# Patient Record
Sex: MALE | Race: WHITE | NOT HISPANIC OR LATINO | Employment: FULL TIME | URBAN - METROPOLITAN AREA
[De-identification: names, ages, dates, MRNs, and addresses within clinical notes are randomized per-mention and may not be internally consistent; named-entity substitution may affect disease eponyms.]

---

## 2020-11-22 ENCOUNTER — APPOINTMENT (OUTPATIENT)
Dept: MRI IMAGING | Facility: HOSPITAL | Age: 40
End: 2020-11-22

## 2020-11-22 ENCOUNTER — APPOINTMENT (OUTPATIENT)
Dept: CT IMAGING | Facility: HOSPITAL | Age: 40
End: 2020-11-22

## 2020-11-22 ENCOUNTER — HOSPITAL ENCOUNTER (OUTPATIENT)
Facility: HOSPITAL | Age: 40
Setting detail: OBSERVATION
Discharge: HOME | End: 2020-11-24
Attending: EMERGENCY MEDICINE | Admitting: INTERNAL MEDICINE

## 2020-11-22 DIAGNOSIS — Z86.39 HISTORY OF DIABETES MELLITUS: ICD-10-CM

## 2020-11-22 DIAGNOSIS — G45.9 TIA (TRANSIENT ISCHEMIC ATTACK): Primary | ICD-10-CM

## 2020-11-22 DIAGNOSIS — Z87.891 HISTORY OF TOBACCO USE: ICD-10-CM

## 2020-11-22 DIAGNOSIS — J06.9 UPPER RESPIRATORY TRACT INFECTION, UNSPECIFIED TYPE: ICD-10-CM

## 2020-11-22 PROBLEM — E78.5 HYPERLIPIDEMIA: Status: ACTIVE | Noted: 2020-11-22

## 2020-11-22 PROBLEM — E11.9 TYPE 2 DIABETES MELLITUS: Status: ACTIVE | Noted: 2020-11-22

## 2020-11-22 LAB
ALBUMIN SERPL-MCNC: 4.8 G/DL (ref 3.5–5.2)
ALBUMIN/GLOB SERPL: 2 G/DL
ALP SERPL-CCNC: 66 U/L (ref 39–117)
ALT SERPL W P-5'-P-CCNC: 43 U/L (ref 1–41)
ANION GAP SERPL CALCULATED.3IONS-SCNC: 14 MMOL/L (ref 5–15)
AST SERPL-CCNC: 40 U/L (ref 1–40)
B PARAPERT DNA SPEC QL NAA+PROBE: NOT DETECTED
B PERT DNA SPEC QL NAA+PROBE: NOT DETECTED
BASOPHILS # BLD AUTO: 0.08 10*3/MM3 (ref 0–0.2)
BASOPHILS NFR BLD AUTO: 1 % (ref 0–1.5)
BILIRUB SERPL-MCNC: 0.4 MG/DL (ref 0–1.2)
BILIRUB UR QL STRIP: NEGATIVE
BUN SERPL-MCNC: 23 MG/DL (ref 6–20)
BUN/CREAT SERPL: 33.3 (ref 7–25)
C PNEUM DNA NPH QL NAA+NON-PROBE: NOT DETECTED
CALCIUM SPEC-SCNC: 9.5 MG/DL (ref 8.6–10.5)
CHLORIDE SERPL-SCNC: 99 MMOL/L (ref 98–107)
CLARITY UR: CLEAR
CO2 SERPL-SCNC: 24 MMOL/L (ref 22–29)
COLOR UR: YELLOW
CREAT SERPL-MCNC: 0.69 MG/DL (ref 0.76–1.27)
DEPRECATED RDW RBC AUTO: 47.8 FL (ref 37–54)
EOSINOPHIL # BLD AUTO: 0.33 10*3/MM3 (ref 0–0.4)
EOSINOPHIL NFR BLD AUTO: 4 % (ref 0.3–6.2)
ERYTHROCYTE [DISTWIDTH] IN BLOOD BY AUTOMATED COUNT: 15.3 % (ref 12.3–15.4)
FLUAV H1 2009 PAND RNA NPH QL NAA+PROBE: NOT DETECTED
FLUAV H1 HA GENE NPH QL NAA+PROBE: NOT DETECTED
FLUAV H3 RNA NPH QL NAA+PROBE: NOT DETECTED
FLUAV SUBTYP SPEC NAA+PROBE: NOT DETECTED
FLUBV RNA ISLT QL NAA+PROBE: NOT DETECTED
GFR SERPL CREATININE-BSD FRML MDRD: 128 ML/MIN/1.73
GLOBULIN UR ELPH-MCNC: 2.4 GM/DL
GLUCOSE BLDC GLUCOMTR-MCNC: 272 MG/DL (ref 70–130)
GLUCOSE SERPL-MCNC: 171 MG/DL (ref 65–99)
GLUCOSE UR STRIP-MCNC: ABNORMAL MG/DL
HADV DNA SPEC NAA+PROBE: NOT DETECTED
HCOV 229E RNA SPEC QL NAA+PROBE: NOT DETECTED
HCOV HKU1 RNA SPEC QL NAA+PROBE: NOT DETECTED
HCOV NL63 RNA SPEC QL NAA+PROBE: NOT DETECTED
HCOV OC43 RNA SPEC QL NAA+PROBE: NOT DETECTED
HCT VFR BLD AUTO: 50.2 % (ref 37.5–51)
HGB BLD-MCNC: 16.7 G/DL (ref 13–17.7)
HGB UR QL STRIP.AUTO: NEGATIVE
HMPV RNA NPH QL NAA+NON-PROBE: NOT DETECTED
HOLD SPECIMEN: NORMAL
HOLD SPECIMEN: NORMAL
HPIV1 RNA SPEC QL NAA+PROBE: NOT DETECTED
HPIV2 RNA SPEC QL NAA+PROBE: NOT DETECTED
HPIV3 RNA NPH QL NAA+PROBE: NOT DETECTED
HPIV4 P GENE NPH QL NAA+PROBE: NOT DETECTED
IMM GRANULOCYTES # BLD AUTO: 0.08 10*3/MM3 (ref 0–0.05)
IMM GRANULOCYTES NFR BLD AUTO: 1 % (ref 0–0.5)
INR PPP: 1.07 (ref 0.85–1.16)
KETONES UR QL STRIP: ABNORMAL
LEUKOCYTE ESTERASE UR QL STRIP.AUTO: NEGATIVE
LYMPHOCYTES # BLD AUTO: 2.05 10*3/MM3 (ref 0.7–3.1)
LYMPHOCYTES NFR BLD AUTO: 25.1 % (ref 19.6–45.3)
M PNEUMO IGG SER IA-ACNC: NOT DETECTED
MCH RBC QN AUTO: 28.9 PG (ref 26.6–33)
MCHC RBC AUTO-ENTMCNC: 33.3 G/DL (ref 31.5–35.7)
MCV RBC AUTO: 86.9 FL (ref 79–97)
MONOCYTES # BLD AUTO: 0.51 10*3/MM3 (ref 0.1–0.9)
MONOCYTES NFR BLD AUTO: 6.2 % (ref 5–12)
NEUTROPHILS NFR BLD AUTO: 5.12 10*3/MM3 (ref 1.7–7)
NEUTROPHILS NFR BLD AUTO: 62.7 % (ref 42.7–76)
NITRITE UR QL STRIP: NEGATIVE
NRBC BLD AUTO-RTO: 0 /100 WBC (ref 0–0.2)
NT-PROBNP SERPL-MCNC: 14.7 PG/ML (ref 0–450)
PH UR STRIP.AUTO: <=5 [PH] (ref 5–8)
PLATELET # BLD AUTO: 190 10*3/MM3 (ref 140–450)
PMV BLD AUTO: 10.9 FL (ref 6–12)
POTASSIUM SERPL-SCNC: 4.3 MMOL/L (ref 3.5–5.2)
PROT SERPL-MCNC: 7.2 G/DL (ref 6–8.5)
PROT UR QL STRIP: NEGATIVE
PROTHROMBIN TIME: 13.6 SECONDS (ref 11.5–14)
QT INTERVAL: 398 MS
QT INTERVAL: 404 MS
QTC INTERVAL: 432 MS
QTC INTERVAL: 439 MS
RBC # BLD AUTO: 5.78 10*6/MM3 (ref 4.14–5.8)
RHINOVIRUS RNA SPEC NAA+PROBE: NOT DETECTED
RSV RNA NPH QL NAA+NON-PROBE: NOT DETECTED
SARS-COV-2 RNA NPH QL NAA+NON-PROBE: NOT DETECTED
SODIUM SERPL-SCNC: 137 MMOL/L (ref 136–145)
SP GR UR STRIP: 1.04 (ref 1–1.03)
TROPONIN T SERPL-MCNC: <0.01 NG/ML (ref 0–0.03)
TROPONIN T SERPL-MCNC: <0.01 NG/ML (ref 0–0.03)
UROBILINOGEN UR QL STRIP: ABNORMAL
WBC # BLD AUTO: 8.17 10*3/MM3 (ref 3.4–10.8)
WHOLE BLOOD HOLD SPECIMEN: NORMAL
WHOLE BLOOD HOLD SPECIMEN: NORMAL

## 2020-11-22 PROCEDURE — 85610 PROTHROMBIN TIME: CPT | Performed by: PHYSICIAN ASSISTANT

## 2020-11-22 PROCEDURE — 82962 GLUCOSE BLOOD TEST: CPT

## 2020-11-22 PROCEDURE — 0 IOPAMIDOL PER 1 ML: Performed by: INTERNAL MEDICINE

## 2020-11-22 PROCEDURE — 93005 ELECTROCARDIOGRAM TRACING: CPT | Performed by: EMERGENCY MEDICINE

## 2020-11-22 PROCEDURE — 70551 MRI BRAIN STEM W/O DYE: CPT

## 2020-11-22 PROCEDURE — 99219 PR INITIAL OBSERVATION CARE/DAY 50 MINUTES: CPT | Performed by: PHYSICIAN ASSISTANT

## 2020-11-22 PROCEDURE — 0202U NFCT DS 22 TRGT SARS-COV-2: CPT | Performed by: PHYSICIAN ASSISTANT

## 2020-11-22 PROCEDURE — 83880 ASSAY OF NATRIURETIC PEPTIDE: CPT | Performed by: PHYSICIAN ASSISTANT

## 2020-11-22 PROCEDURE — 70498 CT ANGIOGRAPHY NECK: CPT

## 2020-11-22 PROCEDURE — 81003 URINALYSIS AUTO W/O SCOPE: CPT | Performed by: PHYSICIAN ASSISTANT

## 2020-11-22 PROCEDURE — 70496 CT ANGIOGRAPHY HEAD: CPT

## 2020-11-22 PROCEDURE — 71275 CT ANGIOGRAPHY CHEST: CPT

## 2020-11-22 PROCEDURE — 84484 ASSAY OF TROPONIN QUANT: CPT | Performed by: PHYSICIAN ASSISTANT

## 2020-11-22 PROCEDURE — G0378 HOSPITAL OBSERVATION PER HR: HCPCS

## 2020-11-22 PROCEDURE — 93005 ELECTROCARDIOGRAM TRACING: CPT | Performed by: PHYSICIAN ASSISTANT

## 2020-11-22 PROCEDURE — 70450 CT HEAD/BRAIN W/O DYE: CPT

## 2020-11-22 PROCEDURE — 99284 EMERGENCY DEPT VISIT MOD MDM: CPT

## 2020-11-22 PROCEDURE — 85025 COMPLETE CBC W/AUTO DIFF WBC: CPT | Performed by: PHYSICIAN ASSISTANT

## 2020-11-22 PROCEDURE — 80053 COMPREHEN METABOLIC PANEL: CPT | Performed by: PHYSICIAN ASSISTANT

## 2020-11-22 PROCEDURE — 0 IOPAMIDOL PER 1 ML: Performed by: EMERGENCY MEDICINE

## 2020-11-22 RX ORDER — OLMESARTAN MEDOXOMIL, AMLODIPINE AND HYDROCHLOROTHIAZIDE TABLET 40/5/25 MG 40; 5; 25 MG/1; MG/1; MG/1
1 TABLET ORAL DAILY
COMMUNITY
End: 2022-01-13 | Stop reason: HOSPADM

## 2020-11-22 RX ORDER — EMPAGLIFLOZIN 25 MG/1
1 TABLET, FILM COATED ORAL DAILY
COMMUNITY
End: 2022-03-22

## 2020-11-22 RX ORDER — ROSUVASTATIN CALCIUM 20 MG/1
20 TABLET, COATED ORAL DAILY
COMMUNITY
End: 2020-11-24 | Stop reason: HOSPADM

## 2020-11-22 RX ORDER — FENOFIBRATE 134 MG/1
134 CAPSULE ORAL
COMMUNITY
End: 2022-03-22

## 2020-11-22 RX ORDER — NICOTINE POLACRILEX 4 MG
15 LOZENGE BUCCAL
Status: DISCONTINUED | OUTPATIENT
Start: 2020-11-22 | End: 2020-11-24 | Stop reason: HOSPADM

## 2020-11-22 RX ORDER — ATORVASTATIN CALCIUM 40 MG/1
80 TABLET, FILM COATED ORAL NIGHTLY
Status: DISCONTINUED | OUTPATIENT
Start: 2020-11-22 | End: 2020-11-23

## 2020-11-22 RX ORDER — SODIUM CHLORIDE 0.9 % (FLUSH) 0.9 %
10 SYRINGE (ML) INJECTION EVERY 12 HOURS SCHEDULED
Status: DISCONTINUED | OUTPATIENT
Start: 2020-11-22 | End: 2020-11-24 | Stop reason: HOSPADM

## 2020-11-22 RX ORDER — METOPROLOL SUCCINATE 100 MG/1
100 TABLET, EXTENDED RELEASE ORAL DAILY
Status: DISCONTINUED | OUTPATIENT
Start: 2020-11-22 | End: 2020-11-22

## 2020-11-22 RX ORDER — ACETAMINOPHEN 325 MG/1
650 TABLET ORAL EVERY 4 HOURS PRN
Status: DISCONTINUED | OUTPATIENT
Start: 2020-11-22 | End: 2020-11-24 | Stop reason: HOSPADM

## 2020-11-22 RX ORDER — DEXTROSE MONOHYDRATE 25 G/50ML
25 INJECTION, SOLUTION INTRAVENOUS
Status: DISCONTINUED | OUTPATIENT
Start: 2020-11-22 | End: 2020-11-24 | Stop reason: HOSPADM

## 2020-11-22 RX ORDER — METOPROLOL SUCCINATE 100 MG/1
100 TABLET, EXTENDED RELEASE ORAL DAILY
COMMUNITY
End: 2022-01-13 | Stop reason: HOSPADM

## 2020-11-22 RX ORDER — SODIUM CHLORIDE 0.9 % (FLUSH) 0.9 %
10 SYRINGE (ML) INJECTION AS NEEDED
Status: DISCONTINUED | OUTPATIENT
Start: 2020-11-22 | End: 2020-11-24 | Stop reason: HOSPADM

## 2020-11-22 RX ORDER — ASPIRIN 81 MG/1
81 TABLET, CHEWABLE ORAL DAILY
COMMUNITY
End: 2022-03-22

## 2020-11-22 RX ORDER — ASPIRIN 300 MG/1
300 SUPPOSITORY RECTAL DAILY
Status: DISCONTINUED | OUTPATIENT
Start: 2020-11-22 | End: 2020-11-24 | Stop reason: HOSPADM

## 2020-11-22 RX ORDER — ASPIRIN 325 MG
325 TABLET ORAL DAILY
Status: DISCONTINUED | OUTPATIENT
Start: 2020-11-22 | End: 2020-11-24 | Stop reason: HOSPADM

## 2020-11-22 RX ORDER — ACETAMINOPHEN 650 MG/1
650 SUPPOSITORY RECTAL EVERY 4 HOURS PRN
Status: DISCONTINUED | OUTPATIENT
Start: 2020-11-22 | End: 2020-11-24 | Stop reason: HOSPADM

## 2020-11-22 RX ORDER — LANSOPRAZOLE 30 MG/1
30 CAPSULE, DELAYED RELEASE ORAL DAILY
COMMUNITY
End: 2022-01-13 | Stop reason: HOSPADM

## 2020-11-22 RX ADMIN — IOPAMIDOL 75 ML: 755 INJECTION, SOLUTION INTRAVENOUS at 12:50

## 2020-11-22 RX ADMIN — SODIUM CHLORIDE 1000 ML: 9 INJECTION, SOLUTION INTRAVENOUS at 13:29

## 2020-11-22 RX ADMIN — ASPIRIN 325 MG ORAL TABLET 325 MG: 325 PILL ORAL at 20:49

## 2020-11-22 RX ADMIN — ATORVASTATIN CALCIUM 80 MG: 40 TABLET, FILM COATED ORAL at 20:48

## 2020-11-22 RX ADMIN — IOPAMIDOL 80 ML: 755 INJECTION, SOLUTION INTRAVENOUS at 18:49

## 2020-11-22 RX ADMIN — SODIUM CHLORIDE, PRESERVATIVE FREE 10 ML: 5 INJECTION INTRAVENOUS at 20:49

## 2020-11-22 RX ADMIN — ACETAMINOPHEN 650 MG: 325 TABLET, FILM COATED ORAL at 20:48

## 2020-11-22 RX ADMIN — IOPAMIDOL 80 ML: 755 INJECTION, SOLUTION INTRAVENOUS at 12:51

## 2020-11-22 NOTE — H&P
[facility] Medicine Services  HISTORY AND PHYSICAL    Patient Name: Dom Russell  : 1980  MRN: [id number]  Primary Care Physician: Provider, No Known  Date of admission: 2020      Subjective   Subjective     Chief Complaint:  dizziness    HPI:  Dom Russell is a 40 y.o. male with past medical history significant for diabetes mellitus, hyperlipidemia, hypertension who presents to [facility] 2020 secondary to dizziness.  The patient reports this morning when he sat up in bed he felt very lightheaded.  He denies spinning sensation or vertigo.  He states he tried to stand up and his lightheadedness got worse.  He was unable to stand and had to remain seated.  He stopped for approximately 3 to 4 minutes before his symptoms resolved allowing him to stand.  He states he then was questioned by his wife about what was going on and felt that he had word finding difficulty.  He denies any slurred speech during this time.  Further denies any numbness, tingling, weakness, dysphagia.  The patient reports occasionally he has had a burning sensation in his chest.  He also choked on water last night while eating dinner.  He denies chest pain during this episode or shortness of breath.  Currently denies chest pain, shortness of breath, abdominal pain, nausea, vomiting, dysuria, hematuria, diarrhea or constipation.    Patient reports Covid 19 exposure last week by a colleague.  He states he has been in close quarters with another female who tested positive.  Reportedly he was around her without a mask reportedly all day.  The patient had a negative Covid test outpatient.    Upon arrival to the ER, his vital signs are stable.  He is afebrile.  Saturating 98% on room air.  His blood pressure is 150/99.  His CMP was unremarkable with the exception of a mildly elevated BUN of 23 and ALT of 43.  His COVID-19 and respiratory viral panel were negative.  His urinalysis revealed  positive ketones and greater than 1000 glucose.  His CBC is unremarkable.  He had a CT angiogram of the chest to evaluate for pulmonary embolism and other abnormality.  There was mild diffuse peribronchial thickening possibly viral syndrome.  There was also marked fatty liver changes and probable mild splenomegaly.  The patient also had a CT scan of the head which revealed no evidence of acute intracranial disease and incidental note of left maxillary and left ethmoid sinus disease.  Lastly the patient had an MRI of the brain which revealed very subtle appearance of increased T2 signal in the posterior left temporal lobe cortex.  There categorizing this as mild asymmetry.    Patient denies fever or chills.  Denies history of DVT or PE.      Current COVID Risks are:  [] Fever []  Cough [] Shortness of breath [] Fatigue [] Change in taste or smell    [] Exposure to COVID positive patient  [] High risk facility   [x]  NONE    Review of Systems   Constitutional: Negative for chills, diaphoresis and fatigue.   Respiratory: Negative for cough, chest tightness and shortness of breath.         Positive for occasional burning in chest.  Positive for choking on water last night.  Negative for dysphagia.   Cardiovascular: Negative for chest pain, palpitations and leg swelling.   Gastrointestinal: Negative for abdominal pain, constipation, diarrhea and nausea.   Genitourinary: Negative for frequency, hematuria, penile pain and penile swelling.   Musculoskeletal: Negative for back pain, joint swelling and myalgias.   Neurological: Positive for dizziness and light-headedness. Negative for tremors, seizures, syncope, speech difficulty, weakness, numbness and headaches.        Positive for word finding difficulty.   All other systems reviewed and are negative.         Personal History     Past Medical History:   Diagnosis Date   • Diabetes mellitus (CMS/HCC)    • Hyperlipidemia        Past Surgical History:   Procedure Laterality  Date   • EAR TUBES     • HIP SURGERY     • SINUS SURGERY     • TONSILLECTOMY         Family History: family history is not on file. Otherwise pertinent FHx was reviewed and unremarkable.     Social History:  reports that he has been smoking cigarettes. He has been smoking about 0.50 packs per day. He has never used smokeless tobacco. He reports previous alcohol use. He reports that he does not use drugs.  Social History     Social History Narrative   • Not on file       Medications:  Available home medication information reviewed.  (Not in a hospital admission)      Allergies   Allergen Reactions   • Clarithromycin Hives   • Codeine Hives   • Fortaz [Ceftazidime] Hives   • Penicillins Hives   • Unasyn [Ampicillin-Sulbactam Sodium] Hives       Objective   Objective     Vital Signs:   Temp:  [97.8 °F (36.6 °C)] 97.8 °F (36.6 °C)  Heart Rate:  [65-77] 72  Resp:  [16] 16  BP: (139-149)/(59-99) 139/59   Total (NIH Stroke Scale): 0    Physical Exam  Vitals signs and nursing note reviewed. Exam conducted with a chaperone present.   Constitutional:       General: He is not in acute distress.     Appearance: Normal appearance. He is obese. He is not ill-appearing.   HENT:      Head: Normocephalic and atraumatic.      Right Ear: Ear canal and external ear normal.      Left Ear: Ear canal and external ear normal.   Eyes:      Conjunctiva/sclera: Conjunctivae normal.      Pupils: Pupils are equal, round, and reactive to light.   Neck:      Musculoskeletal: Normal range of motion.   Cardiovascular:      Rate and Rhythm: Normal rate.      Pulses: Normal pulses.   Pulmonary:      Effort: Pulmonary effort is normal.   Abdominal:      Tenderness: There is no abdominal tenderness. There is no guarding or rebound.      Hernia: No hernia is present.   Skin:     General: Skin is warm.      Coloration: Skin is not jaundiced.      Findings: No rash.   Neurological:      General: No focal deficit present.      Mental Status: He is alert  and oriented to person, place, and time.      Cranial Nerves: No cranial nerve deficit.      Sensory: No sensory deficit.      Motor: No weakness.      Comments: Cranial nerves II through XII intact.  Normal speech and conversation.  5/5 strength in bilateral handgrip, elbow flexion/extension, knee flexion/extension, plantarflexion, dorsiflexion.   Psychiatric:         Mood and Affect: Mood normal.         Thought Content: Thought content normal.            Results Reviewed:  I have personally reviewed most recent indicated data and agree with findings including:  [x]  Laboratory  [x]  Radiology  []  EKG/Telemetry  []  Pathology  [x]  Cardiac/Vascular Studies  []  Old records  []  Other:  Most pertinent findings include:    Results from last 7 days   Lab Units 11/22/20  1149 11/22/20  1148   WBC 10*3/mm3  --  8.17   HEMOGLOBIN g/dL  --  16.7   HEMATOCRIT %  --  50.2   PLATELETS 10*3/mm3  --  190   INR  1.07  --      Results from last 7 days   Lab Units 11/22/20  1357 11/22/20  1149   SODIUM mmol/L  --  137   POTASSIUM mmol/L  --  4.3   CHLORIDE mmol/L  --  99   CO2 mmol/L  --  24.0   BUN mg/dL  --  23*   CREATININE mg/dL  --  0.69*   GLUCOSE mg/dL  --  171*   CALCIUM mg/dL  --  9.5   ALT (SGPT) U/L  --  43*   AST (SGOT) U/L  --  40   TROPONIN T ng/mL <0.010 <0.010   PROBNP pg/mL  --  14.7     Estimated Creatinine Clearance: 258.7 mL/min (A) (by C-G formula based on SCr of 0.69 mg/dL (L)).  Brief Urine Lab Results  (Last result in the past 365 days)      Color   Clarity   Blood   Leuk Est   Nitrite   Protein   CREAT   Urine HCG        11/22/20 1234 Yellow Clear Negative Negative Negative Negative             Imaging Results (Last 24 Hours)     Procedure Component Value Units Date/Time    CT Angiogram Head [[id number]] Resulted: 11/22/20 1819     Updated: 11/22/20 1819    CT Angiogram Neck [[id number]] Resulted: 11/22/20 1819     Updated: 11/22/20 1819    MRI Brain Without Contrast [[id number]] Collected: 11/22/20 1637      Updated: 11/22/20 1751    Narrative:      EXAMINATION: MRI BRAIN WO CONTRAST - 11/22/2020     INDICATION: Dizziness. Difficulty speaking.     TECHNIQUE: Sagittal and axial T1 and axial T2 FLAIR and  diffusion-weighted images of the brain, axial T2 Flash images.     COMPARISON: Unenhanced head CT scan of same date.     FINDINGS: History indicates transient episode of dizziness, difficulty  speaking, confusion this morning.     No evidence of restricted diffusion is seen to suggest acute infarction.  The diffusion series does show a subtle asymmetry in appearance of the  cortex of the posterior temporal lobes, which has a little higher T2  signal on the left than on the right, on all three diffusion-weighted  series. It is uncertain if this is a type of chemical shift artifact or  could represent trace amount of edema. Subjectively, left temporal  cortex appears a little higher in signal on corresponding FLAIR images,  specifically #11, #12, #13 and #14 of series 10 than the contralateral  side.     No well-defined areas of edema are identified. There is no evidence of  mass, mass effect, signal abnormality suggestive of hemorrhage, evidence  of hydrocephalus, or abnormal extra-axial collection. Sagittal images  show the midline structures to appear within normal limits, the  pituitary gland at upper limits of normal AP diameter at 10 mm, but not  extending above the sella turcica. Anatomy of the craniocervical  junction appears normal. There is expected flow signal in the major  intracranial arteries and median sagittal sinus.       Impression:      1. No evidence of acute infarction or other clearly acute disease.     2. Very subtle appearance of increased T2 signal in the posterior left  temporal lobe cortex. This is a very mild asymmetry and could be  artifactual. If the patient's symptoms refer to this region, consider  follow-up exam, preferably including contrast enhanced series. EEG  correlation would also  be helpful, depending on clinical history.     DICTATED:   11/22/2020  EDITED/ls :   11/22/2020           CT Angiogram Chest [[id number]] Collected: 11/22/20 1304     Updated: 11/22/20 1326    Narrative:      EXAMINATION: CT ANGIOGRAM CHEST - 11/22/2020     INDICATION: Near syncope, dizziness, left arm weakness, cough and Covid  exposure.     TECHNIQUE: Spiral acquisition 3 mm post IV contrast images through the  chest with sagittal and coronal 2 mm 2D reconstructions.     The radiation dose reduction device was turned on for each scan per the  ALARA (As Low as Reasonably Achievable) protocol.     COMPARISON: None.     FINDINGS: There is good contrast opacification of the pulmonary  arteries. No filling defects are seen to suggest pulmonary embolic  disease. There is no evidence of thoracic aortic aneurysm or dissection,  mediastinal or hilar adenopathy, or pericardial or pleural effusion.     Lung window images show mild image-related blurring throughout the scan.  There may be mild diffuse peribronchial thickening, but this is  difficult to distinguish from motion-related blurring. There is no  evidence of focal lung disease, in particular, no focal groundglass  opacity is seen to suggest Covid 19 pneumonia or other atypical  pneumonia.     Included images of the upper abdomen show extensive fatty liver change.  No significant abnormalities are seen included portions of the  gallbladder, pancreas, adrenal glands, or upper renal poles. The spleen  is incompletely included on this exam but appears to be mildly enlarged.  Bony structures appear grossly intact.       Impression:      1. No evidence of pulmonary embolus.     2. Motion degraded exam with what appears to be mild diffuse  peribronchial thickening, as from viral syndrome, although this may in  part be due to image blurring. No evidence of focal pulmonary disease  and, in particular, no findings typical of Covid 19 pneumonia.     3. Marked fatty liver change  and probably mild splenomegaly.     DICTATED:   11/22/2020  EDITED/ls :   11/22/2020           CT Head Without Contrast [[id number]] Collected: 11/22/20 1300     Updated: 11/22/20 1319    Narrative:      EXAMINATION: CT HEAD WO CONTRAST - 11/22/2020     INDICATION: Near syncope, dizziness, left arm weakness and cough. Covid  exposure.     TECHNIQUE: 5 mm unenhanced images through the brain.     The radiation dose reduction device was turned on for each scan per the  ALARA (As Low as Reasonably Achievable) protocol.     COMPARISON: None.     FINDINGS: History indicates syncope, left arm weakness, dizziness, and  cough.     The calvarium appears intact. Left maxillary sinus is mostly opacified  although there may be a left maxillary sinus window present. There is  left ethmoid sinus opacification. Remaining paranasal sinuses and  mastoids appear clear. Soft tissue window images show the brain to  appear within normal limits. There is no evidence of acute or old  infarct, no evidence of edema, hemorrhage, contusion, hydrocephalus,  mass or mass effect, or abnormal extra-axial collection. No unusual  focal or generalized brain atrophy is seen.       Impression:      No evidence of acute intracranial disease is seen.  Incidentally noted left maxillary and left ethmoid sinus disease.     DICTATED:   11/22/2020  EDITED/ls :   11/22/2020                Assessment/Plan   Assessment & Plan     Active Hospital Problems    Diagnosis POA   • **TIA (transient ischemic attack) [G45.9] Yes   • Type 2 diabetes mellitus (CMS/HCC) [E11.9] Yes   • Hyperlipidemia [E78.5] Yes       Dom Russell is a 40 y.o. male with past medical history significant for diabetes mellitus, hyperlipidemia, hypertension who presents to [facility] 11/22/2020 secondary to dizziness.  Upon waking the patient reportedly had an episode of lightheadedness seated and worsened with standing.  Subsequently he had word finding difficulty causing him  concern.  Incidentally he reports COVID-19 exposure by colleague.  Negative in the ER.  Stroke work-up as revealed MRI abnormality in the left temporal region and he will be admitted for further work-up.    Rule out CVA  Lightheadedness  Word finding difficulty  -CT head negative  -MRI brain: Very subtle appearance of increased T2 signal in the posterior left temporal lobe complex.  Mild asymmetry, could be artifact.  -Neurology consult  -Lipid panel in the a.m.  -High intensity statin for now  -Hemoglobin A1c in the a.m.  -ASA 325mg    Hypertension  -Metoprolol  mg every 24 hours  -Tribenzor 40-5-25mg at home, continue equivalents here    Diabetes mellitus  -The patient is taking Metformin 2000 mg daily, Jardiance 25 mg daily and Januvia 100 mg daily  -Hold the above diabetes medications  -Sliding scale insulin  -Hemoglobin A1c in the morning    Hyperlipidemia  -Takes Crestor 20 mg at home   -High intensity statin     Tobacco abuse  -Counseled patient on tobacco cessation.  He reports he is quitting today.  -He has quit in the past and recently picked up smoking approximately 3 months ago secondary to stress.    Intermittent chest burning  -Negative for pulmonary embolism  -No evidence of pneumonia on CT  -Mild peribronchial thickening, could be reactive airway.    Fatty liver and splenomegaly   -needs outpatient follow up    DVT prophylaxis:  SCD      CODE STATUS:    Code Status and Medical Interventions:   Ordered at: 11/22/20 8170     Level Of Support Discussed With:    Patient     Code Status:    CPR     Medical Interventions (Level of Support Prior to Arrest):    Full       Admission Status:  I believe this patient meets OBSERVATION status, however if further evaluation or treatment plans warrant, status may change.  Based upon current information, I predict patient's care encounter to be less than or equal to 2 midnights.        Moira Ledezma PA-C  11/22/20

## 2020-11-22 NOTE — NURSING NOTE
"Stroke Navigator Note    I was contacted by Zahida DE JESUS to discuss Mr. Russell.    Drew is a 40-year-old right-handed  male with known medical diagnosis of hypertension, hyperlipidemia, diabetes type 2, obesity, tobacco abuse (1/2 pack/day), and palpitations who presented to the emergency department today with complaints of dizziness.  The patient states he went to bed last night at 2330 and was in his normal state of health at this time.  He states that he awoke at 0900 and noted extreme dizziness.  He reports that he tried to stand up however he fell backwards into the bed.  He does endorse confusion and difficulty \"saying what he wanted to say\" for approximately 30 minutes after this episode occurred.  He denies experiencing unilateral weakness, unilateral numbness, facial droop, loss of vision, dysarthria at this time.  He does feel that his vision has worsened over the past few weeks and has become \"more blurry\"and that he has been generally weak for the past 2 days.  He denies recent illness, fever, shortness of breath, cough, or exposure to COVID-19.    Shortly after arrival he underwent a CT of the head without contrast which was negative for hemorrhage and/or acute process.  He also underwent a CT angio of the chest to further rule out infectious process.  MRI of the brain was obtained and was negative for acute infarct however did suggest subtle increased to T signal in the posterior left temporal lobe which could possibly be artifact.  After speaking with Zahida DE JESUS I suggested further evaluation of the patient's posterior circulation with CTA head/neck given his sudden onset of dizziness and multiple stroke risk factors.    He will be admitted to the hospitalist service for further TIA/stroke work-up.    We will initiate the TIA/stroke order set (with out thrombolytic therapy) per protocol.    Formal neurology consult to follow.    Dawn Golden RN Extender/Stroke Navigator      "

## 2020-11-22 NOTE — ED PROVIDER NOTES
"Subjective   Pt is a 39 yo male presenting to ED with complaints of dizziness. PMHx significant for HTN, HLD and DM. Pt reports waking up this morning and feeling dizzy while still in bed. He tried to stand up and fell backwards but denies hitting his head or LOC. He felt \"confused\" for about 30 minutes with \"I knew what I wanted to say but couldn't get all the words out\" but is back to baseline currently. He has had a mild headache. He reports his wife checked him out and thought maybe he had some weakness in left arm but patient denies any weakness. He reports the dizziness has resolved except for some mild dizziness with sitting up. He denies neck pain, vision changes, slurred speech, numbness, tingling and facial droop. He explains he was exposed to a non symptomatic coworker who tested Covid positive last Thursday. He tested negative on Friday. Pt works for the [employer]. He developed body aches, SOB and productive cough 2 days ago. He denies specific fever, sore throat, sinus pain, N/V/D, abdominal pain, back pain, leg swelling or loss of taste/smell. Pt does use tobacco and denies ETOH or drug use.       History provided by:  Patient and medical records      Review of Systems   Constitutional: Negative for appetite change, chills and fever.   HENT: Negative for congestion, ear pain, sore throat and trouble swallowing.    Eyes: Negative for pain, redness and visual disturbance.   Respiratory: Positive for cough. Negative for shortness of breath.    Cardiovascular: Negative for chest pain and leg swelling.   Gastrointestinal: Negative for abdominal pain, blood in stool, constipation, diarrhea, nausea and vomiting.   Genitourinary: Negative for difficulty urinating, dysuria and flank pain.   Musculoskeletal: Negative for arthralgias, back pain and joint swelling.   Skin: Negative.  Negative for rash and wound.   Allergic/Immunologic: Negative.    Neurological: Positive for dizziness, speech " difficulty and headaches. Negative for syncope, weakness and numbness.   Psychiatric/Behavioral: Negative for confusion.   All other systems reviewed and are negative.      Past Medical History:   Diagnosis Date   • Diabetes mellitus (CMS/HCC)    • Hyperlipidemia        Allergies   Allergen Reactions   • Clarithromycin Hives   • Codeine Hives   • Fortaz [Ceftazidime] Hives   • Penicillins Hives   • Unasyn [Ampicillin-Sulbactam Sodium] Hives       Past Surgical History:   Procedure Laterality Date   • EAR TUBES     • HIP SURGERY     • SINUS SURGERY     • TONSILLECTOMY         History reviewed. No pertinent family history.    Social History     Socioeconomic History   • Marital status:      Spouse name: Not on file   • Number of children: Not on file   • Years of education: Not on file   • Highest education level: Not on file   Tobacco Use   • Smoking status: Current Every Day Smoker     Packs/day: 0.50     Types: Cigarettes   • Smokeless tobacco: Never Used   Substance and Sexual Activity   • Alcohol use: Not Currently   • Drug use: Never   • Sexual activity: Defer           Objective   Physical Exam  Vitals signs and nursing note reviewed.   Constitutional:       Appearance: He is well-developed. He is obese.   HENT:      Head: Atraumatic.      Nose: Nose normal.   Eyes:      General: Lids are normal.      Conjunctiva/sclera: Conjunctivae normal.      Pupils: Pupils are equal, round, and reactive to light.   Neck:      Musculoskeletal: Normal range of motion and neck supple. No neck rigidity or muscular tenderness.   Cardiovascular:      Rate and Rhythm: Normal rate and regular rhythm.      Heart sounds: Normal heart sounds.   Pulmonary:      Effort: Pulmonary effort is normal.      Breath sounds: Normal breath sounds.   Abdominal:      General: There is no distension.      Palpations: Abdomen is soft.      Tenderness: There is no abdominal tenderness. There is no guarding or rebound.   Musculoskeletal:  Normal range of motion.         General: No tenderness or deformity.   Skin:     General: Skin is warm and dry.   Neurological:      Mental Status: He is alert and oriented to person, place, and time.      Cranial Nerves: Cranial nerves are intact.      Sensory: Sensation is intact. No sensory deficit.      Motor: Motor function is intact.      Coordination: Coordination is intact.      Comments: No pronator drift    Psychiatric:         Behavior: Behavior normal.         Procedures           ED Course  ED Course as of Nov 22 1802   Sun Nov 22, 2020   1322 Glucose(!): 171 [RT]   1322 Creatinine(!): 0.69 [RT]   1322 Glucose(!): >=1000 mg/dL (3+) [RT]   1322 Ketones, UA(!): 15 mg/dL (1+) [RT]   1322 Anion Gap: 14.0 [RT]      ED Course User Index  [RT] Zahida Saucedo PA      Discussed patient with Dr. Blandon who personally examined patient and agrees with ED course.     Discussed patient with Neurologist Dr. Maciel. She instructed to alert stroke navigator and admit to hospitalist for TIA workup.     Notified stroke navigator and she instructed to order CTA head/neck.     Discussed patient with hospitalist Dr. Ruggiero.     Re-examined patient several times in ED. Pt resting, no distress. Discussed results and tx plan for admission. Pt agreeable.       Recent Results (from the past 24 hour(s))   ECG 12 Lead    Collection Time: 11/22/20 11:46 AM   Result Value Ref Range    QT Interval 398 ms    QTC Interval 432 ms   Lavender Top    Collection Time: 11/22/20 11:48 AM   Result Value Ref Range    Extra Tube hold for add-on    CBC Auto Differential    Collection Time: 11/22/20 11:48 AM    Specimen: Blood   Result Value Ref Range    WBC 8.17 3.40 - 10.80 10*3/mm3    RBC 5.78 4.14 - 5.80 10*6/mm3    Hemoglobin 16.7 13.0 - 17.7 g/dL    Hematocrit 50.2 37.5 - 51.0 %    MCV 86.9 79.0 - 97.0 fL    MCH 28.9 26.6 - 33.0 pg    MCHC 33.3 31.5 - 35.7 g/dL    RDW 15.3 12.3 - 15.4 %    RDW-SD 47.8 37.0 - 54.0 fl    MPV 10.9 6.0 - 12.0  fL    Platelets 190 140 - 450 10*3/mm3    Neutrophil % 62.7 42.7 - 76.0 %    Lymphocyte % 25.1 19.6 - 45.3 %    Monocyte % 6.2 5.0 - 12.0 %    Eosinophil % 4.0 0.3 - 6.2 %    Basophil % 1.0 0.0 - 1.5 %    Immature Grans % 1.0 (H) 0.0 - 0.5 %    Neutrophils, Absolute 5.12 1.70 - 7.00 10*3/mm3    Lymphocytes, Absolute 2.05 0.70 - 3.10 10*3/mm3    Monocytes, Absolute 0.51 0.10 - 0.90 10*3/mm3    Eosinophils, Absolute 0.33 0.00 - 0.40 10*3/mm3    Basophils, Absolute 0.08 0.00 - 0.20 10*3/mm3    Immature Grans, Absolute 0.08 (H) 0.00 - 0.05 10*3/mm3    nRBC 0.0 0.0 - 0.2 /100 WBC   Light Blue Top    Collection Time: 11/22/20 11:49 AM   Result Value Ref Range    Extra Tube hold for add-on    Green Top (Gel)    Collection Time: 11/22/20 11:49 AM   Result Value Ref Range    Extra Tube Hold for add-ons.    Gold Top - SST    Collection Time: 11/22/20 11:49 AM   Result Value Ref Range    Extra Tube Hold for add-ons.    Comprehensive Metabolic Panel    Collection Time: 11/22/20 11:49 AM    Specimen: Blood   Result Value Ref Range    Glucose 171 (H) 65 - 99 mg/dL    BUN 23 (H) 6 - 20 mg/dL    Creatinine 0.69 (L) 0.76 - 1.27 mg/dL    Sodium 137 136 - 145 mmol/L    Potassium 4.3 3.5 - 5.2 mmol/L    Chloride 99 98 - 107 mmol/L    CO2 24.0 22.0 - 29.0 mmol/L    Calcium 9.5 8.6 - 10.5 mg/dL    Total Protein 7.2 6.0 - 8.5 g/dL    Albumin 4.80 3.50 - 5.20 g/dL    ALT (SGPT) 43 (H) 1 - 41 U/L    AST (SGOT) 40 1 - 40 U/L    Alkaline Phosphatase 66 39 - 117 U/L    Total Bilirubin 0.4 0.0 - 1.2 mg/dL    eGFR Non African Amer 128 >60 mL/min/1.73    Globulin 2.4 gm/dL    A/G Ratio 2.0 g/dL    BUN/Creatinine Ratio 33.3 (H) 7.0 - 25.0    Anion Gap 14.0 5.0 - 15.0 mmol/L   Protime-INR    Collection Time: 11/22/20 11:49 AM    Specimen: Blood   Result Value Ref Range    Protime 13.6 11.5 - 14.0 Seconds    INR 1.07 0.85 - 1.16   BNP    Collection Time: 11/22/20 11:49 AM    Specimen: Blood   Result Value Ref Range    proBNP 14.7 0.0 - 450.0 pg/mL    Troponin    Collection Time: 11/22/20 11:49 AM    Specimen: Blood   Result Value Ref Range    Troponin T <0.010 0.000 - 0.030 ng/mL   Respiratory Panel PCR w/COVID-19(SARS-CoV-2) HUSSEIN/BECKY/RISA/PAD/COR/MAD/ADDY In-House, NP Swab in UTM/VTM, 3-4 HR TAT - Swab, Nasopharynx    Collection Time: 11/22/20 12:25 PM    Specimen: Nasopharynx; Swab   Result Value Ref Range    ADENOVIRUS, PCR Not Detected Not Detected    Coronavirus 229E Not Detected Not Detected    Coronavirus HKU1 Not Detected Not Detected    Coronavirus NL63 Not Detected Not Detected    Coronavirus OC43 Not Detected Not Detected    COVID19 Not Detected Not Detected - Ref. Range    Human Metapneumovirus Not Detected Not Detected    Human Rhinovirus/Enterovirus Not Detected Not Detected    Influenza A PCR Not Detected Not Detected    Influenza A H1 Not Detected Not Detected    Influenza A H1 2009 PCR Not Detected Not Detected    Influenza A H3 Not Detected Not Detected    Influenza B PCR Not Detected Not Detected    Parainfluenza Virus 1 Not Detected Not Detected    Parainfluenza Virus 2 Not Detected Not Detected    Parainfluenza Virus 3 Not Detected Not Detected    Parainfluenza Virus 4 Not Detected Not Detected    RSV, PCR Not Detected Not Detected    Bordetella pertussis pcr Not Detected Not Detected    Bordetella parapertussis PCR Not Detected Not Detected    Chlamydophila pneumoniae PCR Not Detected Not Detected    Mycoplasma pneumo by PCR Not Detected Not Detected   Urinalysis With Microscopic If Indicated (No Culture) - Urine, Clean Catch    Collection Time: 11/22/20 12:34 PM    Specimen: Urine, Clean Catch   Result Value Ref Range    Color, UA Yellow Yellow, Straw    Appearance, UA Clear Clear    pH, UA <=5.0 5.0 - 8.0    Specific Gravity, UA 1.043 (H) 1.001 - 1.030    Glucose, UA >=1000 mg/dL (3+) (A) Negative    Ketones, UA 15 mg/dL (1+) (A) Negative    Bilirubin, UA Negative Negative    Blood, UA Negative Negative    Protein, UA Negative Negative     Leuk Esterase, UA Negative Negative    Nitrite, UA Negative Negative    Urobilinogen, UA 0.2 E.U./dL 0.2 - 1.0 E.U./dL   ECG 12 Lead    Collection Time: 11/22/20  1:56 PM   Result Value Ref Range    QT Interval 404 ms    QTC Interval 439 ms   Troponin    Collection Time: 11/22/20  1:57 PM    Specimen: Blood   Result Value Ref Range    Troponin T <0.010 0.000 - 0.030 ng/mL     Note: In addition to lab results from this visit, the labs listed above may include labs taken at another facility or during a different encounter within the last 24 hours. Please correlate lab times with ED admission and discharge times for further clarification of the services performed during this visit.    MRI Brain Without Contrast   Preliminary Result   1. No evidence of acute infarction or other clearly acute disease.       2. Very subtle appearance of increased T2 signal in the posterior left   temporal lobe cortex. This is a very mild asymmetry and could be   artifactual. If the patient's symptoms refer to this region, consider   follow-up exam, preferably including contrast enhanced series. EEG   correlation would also be helpful, depending on clinical history.       DICTATED:   11/22/2020   EDITED/ls :   11/22/2020               CT Head Without Contrast   Preliminary Result   No evidence of acute intracranial disease is seen.   Incidentally noted left maxillary and left ethmoid sinus disease.       DICTATED:   11/22/2020   EDITED/ls :   11/22/2020          CT Angiogram Chest   Preliminary Result   1. No evidence of pulmonary embolus.       2. Motion degraded exam with what appears to be mild diffuse   peribronchial thickening, as from viral syndrome, although this may in   part be due to image blurring. No evidence of focal pulmonary disease   and, in particular, no findings typical of Covid 19 pneumonia.       3. Marked fatty liver change and probably mild splenomegaly.       DICTATED:   11/22/2020   EDITED/ls :   11/22/2020                CT Angiogram Head    (Results Pending)   CT Angiogram Neck    (Results Pending)     Vitals:    11/22/20 1259 11/22/20 1400 11/22/20 1430 11/22/20 1500   BP:  141/80  139/59   BP Location:       Patient Position:       Pulse: 77 65 70 72   Resp:       Temp:       TempSrc:       SpO2: 97% 97% 95% 96%   Weight:       Height:         Medications   sodium chloride 0.9 % flush 10 mL (has no administration in time range)   sodium chloride 0.9 % flush 10 mL (has no administration in time range)   iopamidol (ISOVUE-370) 76 % injection 100 mL (75 mL Intravenous Given 11/22/20 1250)   iopamidol (ISOVUE-370) 76 % injection 80 mL (80 mL Intravenous Given 11/22/20 1251)   sodium chloride 0.9 % bolus 1,000 mL (0 mL Intravenous Stopped 11/22/20 1613)     ECG/EMG Results (last 24 hours)     Procedure Component Value Units Date/Time    ECG 12 Lead [[id number]] Collected: 11/22/20 1146     Updated: 11/22/20 1423     QT Interval 398 ms      QTC Interval 432 ms     Narrative:      Test Reason : dizzy  Blood Pressure : **/** mmHG  Vent. Rate : 071 BPM     Atrial Rate : 071 BPM     P-R Int : 158 ms          QRS Dur : 096 ms      QT Int : 398 ms       P-R-T Axes : 027 074 059 degrees     QTc Int : 432 ms    Normal sinus rhythm  Normal ECG  No previous ECGs available  Confirmed by MARIELLA CORTES MD ([id number]) on 11/22/2020 2:23:36 PM    Referred By:  ermd           Confirmed By:MARIELLA CORTES MD    ECG 12 Lead [[id number]] Collected: 11/22/20 1356     Updated: 11/22/20 1431     QT Interval 404 ms      QTC Interval 439 ms     Narrative:      Test Reason : dizziness  Blood Pressure : **/** mmHG  Vent. Rate : 071 BPM     Atrial Rate : 071 BPM     P-R Int : 174 ms          QRS Dur : 098 ms      QT Int : 404 ms       P-R-T Axes : 000 105 127 degrees     QTc Int : 439 ms    Normal sinus rhythm  Lateral infarct , age undetermined  Abnormal ECG  When compared with ECG of 22-NOV-2020 11:46,  QRS axis shifted right    Confirmed by MARIELLA CORTES MD ([id number])  on 11/22/2020 2:30:55 PM    Referred By:  Barber           Confirmed By:MARIELLA CORTES MD        ECG 12 Lead   Final Result   Test Reason : dizziness   Blood Pressure : **/** mmHG   Vent. Rate : 071 BPM     Atrial Rate : 071 BPM      P-R Int : 174 ms          QRS Dur : 098 ms       QT Int : 404 ms       P-R-T Axes : 000 105 127 degrees      QTc Int : 439 ms      Normal sinus rhythm   Lateral infarct , age undetermined   Abnormal ECG   When compared with ECG of 22-NOV-2020 11:46,   QRS axis shifted right      Confirmed by MARIELLA CORTES MD ([id number]) on 11/22/2020 2:30:55 PM      Referred By:  Barber           Confirmed By:MARIELLA CORTES MD      ECG 12 Lead   Final Result   Test Reason : dizzy   Blood Pressure : **/** mmHG   Vent. Rate : 071 BPM     Atrial Rate : 071 BPM      P-R Int : 158 ms          QRS Dur : 096 ms       QT Int : 398 ms       P-R-T Axes : 027 074 059 degrees      QTc Int : 432 ms      Normal sinus rhythm   Normal ECG   No previous ECGs available   Confirmed by MARIELLA CORTES MD ([id number]) on 11/22/2020 2:23:36 PM      Referred By:  [email or web address]           Confirmed By:MARIELLA CORTES MD            COVID-19 RISK SCREEN     1. Has the patient had close contact without PPE with a lab confirmed COVID-19 (+) person or a person under investigation (PUI) for COVID-19 infection?  -- Yes     2. Has the patient had respiratory symptoms, worsened/new cough and/or SOA, unexplained fever, or sudden loss of smell and/or taste in the past 7 days? --  Yes    3. Does the patient have baseline higher exposure risk such as working in healthcare field, currently residing in healthcare facility, or ongoing hemodialysis?  --  No                                       MDM    Final diagnoses:   TIA (transient ischemic attack)   Upper respiratory tract infection, unspecified type   History of diabetes mellitus   History of tobacco use            Zahida Saucedo PA  11/22/20 8512

## 2020-11-23 ENCOUNTER — APPOINTMENT (OUTPATIENT)
Dept: MRI IMAGING | Facility: HOSPITAL | Age: 40
End: 2020-11-23

## 2020-11-23 ENCOUNTER — APPOINTMENT (OUTPATIENT)
Dept: CARDIOLOGY | Facility: HOSPITAL | Age: 40
End: 2020-11-23

## 2020-11-23 ENCOUNTER — APPOINTMENT (OUTPATIENT)
Dept: NEUROLOGY | Facility: HOSPITAL | Age: 40
End: 2020-11-23

## 2020-11-23 LAB
ARTICHOKE IGE QN: 41 MG/DL (ref 0–100)
BH CV ECHO MEAS - AO MAX PG (FULL): 2 MMHG
BH CV ECHO MEAS - AO MAX PG: 5.1 MMHG
BH CV ECHO MEAS - AO ROOT AREA (BSA CORRECTED): 1.1
BH CV ECHO MEAS - AO ROOT AREA: 8.5 CM^2
BH CV ECHO MEAS - AO ROOT DIAM: 3.3 CM
BH CV ECHO MEAS - AO V2 MAX: 112.9 CM/SEC
BH CV ECHO MEAS - ASC AORTA: 3.1 CM
BH CV ECHO MEAS - AVA(V,A): 2.9 CM^2
BH CV ECHO MEAS - AVA(V,D): 2.9 CM^2
BH CV ECHO MEAS - BSA(HAYCOCK): 3.2 M^2
BH CV ECHO MEAS - BSA: 3 M^2
BH CV ECHO MEAS - BZI_BMI: 46 KILOGRAMS/M^2
BH CV ECHO MEAS - BZI_METRIC_HEIGHT: 198.1 CM
BH CV ECHO MEAS - BZI_METRIC_WEIGHT: 180.5 KG
BH CV ECHO MEAS - EDV(CUBED): 117.7 ML
BH CV ECHO MEAS - EDV(MOD-SP4): 122 ML
BH CV ECHO MEAS - EDV(TEICH): 112.9 ML
BH CV ECHO MEAS - EF(CUBED): 77.5 %
BH CV ECHO MEAS - EF(MOD-SP4): 54.1 %
BH CV ECHO MEAS - EF(TEICH): 69.5 %
BH CV ECHO MEAS - ESV(CUBED): 26.5 ML
BH CV ECHO MEAS - ESV(MOD-SP4): 56 ML
BH CV ECHO MEAS - ESV(TEICH): 34.4 ML
BH CV ECHO MEAS - FS: 39.2 %
BH CV ECHO MEAS - IVS/LVPW: 1.1
BH CV ECHO MEAS - IVSD: 1.1 CM
BH CV ECHO MEAS - LA DIMENSION: 3.9 CM
BH CV ECHO MEAS - LA/AO: 1.2
BH CV ECHO MEAS - LAD MAJOR: 5.5 CM
BH CV ECHO MEAS - LAT PEAK E' VEL: 12.8 CM/SEC
BH CV ECHO MEAS - LATERAL E/E' RATIO: 8.6
BH CV ECHO MEAS - LV DIASTOLIC VOL/BSA (35-75): 40.3 ML/M^2
BH CV ECHO MEAS - LV IVRT: 0.07 SEC
BH CV ECHO MEAS - LV MASS(C)D: 191.6 GRAMS
BH CV ECHO MEAS - LV MASS(C)DI: 63.4 GRAMS/M^2
BH CV ECHO MEAS - LV MAX PG: 3.1 MMHG
BH CV ECHO MEAS - LV MEAN PG: 1.6 MMHG
BH CV ECHO MEAS - LV SYSTOLIC VOL/BSA (12-30): 18.5 ML/M^2
BH CV ECHO MEAS - LV V1 MAX: 88.4 CM/SEC
BH CV ECHO MEAS - LV V1 MEAN: 59.9 CM/SEC
BH CV ECHO MEAS - LV V1 VTI: 17.5 CM
BH CV ECHO MEAS - LVIDD: 4.9 CM
BH CV ECHO MEAS - LVIDS: 3 CM
BH CV ECHO MEAS - LVLD AP4: 9 CM
BH CV ECHO MEAS - LVLS AP4: 6.9 CM
BH CV ECHO MEAS - LVOT AREA (M): 3.8 CM^2
BH CV ECHO MEAS - LVOT AREA: 3.8 CM^2
BH CV ECHO MEAS - LVOT DIAM: 2.2 CM
BH CV ECHO MEAS - LVPWD: 1 CM
BH CV ECHO MEAS - MED PEAK E' VEL: 15.8 CM/SEC
BH CV ECHO MEAS - MEDIAL E/E' RATIO: 7
BH CV ECHO MEAS - MV A MAX VEL: 38.5 CM/SEC
BH CV ECHO MEAS - MV DEC TIME: 0.2 SEC
BH CV ECHO MEAS - MV E MAX VEL: 111.6 CM/SEC
BH CV ECHO MEAS - MV E/A: 2.9
BH CV ECHO MEAS - PA ACC SLOPE: 359.8 CM/SEC^2
BH CV ECHO MEAS - PA ACC TIME: 0.21 SEC
BH CV ECHO MEAS - PA MAX PG: 3.7 MMHG
BH CV ECHO MEAS - PA PR(ACCEL): -16.1 MMHG
BH CV ECHO MEAS - PA V2 MAX: 95.6 CM/SEC
BH CV ECHO MEAS - SI(CUBED): 30.2 ML/M^2
BH CV ECHO MEAS - SI(LVOT): 21.8 ML/M^2
BH CV ECHO MEAS - SI(MOD-SP4): 21.8 ML/M^2
BH CV ECHO MEAS - SI(TEICH): 25.9 ML/M^2
BH CV ECHO MEAS - SV(CUBED): 91.2 ML
BH CV ECHO MEAS - SV(LVOT): 65.8 ML
BH CV ECHO MEAS - SV(MOD-SP4): 66 ML
BH CV ECHO MEAS - SV(TEICH): 78.4 ML
BH CV ECHO MEAS - TAPSE (>1.6): 2.1 CM
BH CV ECHO MEASUREMENTS AVERAGE E/E' RATIO: 7.8
BH CV VAS BP LEFT ARM: NORMAL MMHG
BH CV XLRA - RV BASE: 3.4 CM
BH CV XLRA - RV LENGTH: 7.4 CM
BH CV XLRA - RV MID: 2.4 CM
BH CV XLRA - TDI S': 14.6 CM/SEC
CHOLEST SERPL-MCNC: 191 MG/DL (ref 0–200)
GLUCOSE BLDC GLUCOMTR-MCNC: 211 MG/DL (ref 70–130)
GLUCOSE BLDC GLUCOMTR-MCNC: 216 MG/DL (ref 70–130)
GLUCOSE BLDC GLUCOMTR-MCNC: 242 MG/DL (ref 70–130)
GLUCOSE BLDC GLUCOMTR-MCNC: 303 MG/DL (ref 70–130)
GLUCOSE BLDC GLUCOMTR-MCNC: 321 MG/DL (ref 70–130)
HBA1C MFR BLD: 10 % (ref 4.8–5.6)
HDLC SERPL-MCNC: 19 MG/DL (ref 40–60)
LDLC SERPL CALC-MCNC: ABNORMAL MG/DL
LDLC/HDLC SERPL: ABNORMAL {RATIO}
LEFT ATRIUM VOLUME INDEX: 19.2 ML/M^2
LEFT ATRIUM VOLUME: 58 ML
LV EF 2D ECHO EST: 60 %
MAXIMAL PREDICTED HEART RATE: 182 BPM
STRESS TARGET HR: 155 BPM
TRIGL SERPL-MCNC: 1145 MG/DL (ref 0–150)
VLDLC SERPL-MCNC: ABNORMAL MG/DL

## 2020-11-23 PROCEDURE — 92523 SPEECH SOUND LANG COMPREHEN: CPT

## 2020-11-23 PROCEDURE — 70552 MRI BRAIN STEM W/DYE: CPT

## 2020-11-23 PROCEDURE — G0378 HOSPITAL OBSERVATION PER HR: HCPCS

## 2020-11-23 PROCEDURE — 95816 EEG AWAKE AND DROWSY: CPT

## 2020-11-23 PROCEDURE — 63710000001 INSULIN DETEMIR PER 5 UNITS: Performed by: INTERNAL MEDICINE

## 2020-11-23 PROCEDURE — 99225 PR SBSQ OBSERVATION CARE/DAY 25 MINUTES: CPT | Performed by: INTERNAL MEDICINE

## 2020-11-23 PROCEDURE — 93306 TTE W/DOPPLER COMPLETE: CPT

## 2020-11-23 PROCEDURE — 99205 OFFICE O/P NEW HI 60 MIN: CPT | Performed by: STUDENT IN AN ORGANIZED HEALTH CARE EDUCATION/TRAINING PROGRAM

## 2020-11-23 PROCEDURE — 94799 UNLISTED PULMONARY SVC/PX: CPT

## 2020-11-23 PROCEDURE — 82962 GLUCOSE BLOOD TEST: CPT

## 2020-11-23 PROCEDURE — 83721 ASSAY OF BLOOD LIPOPROTEIN: CPT | Performed by: PHYSICIAN ASSISTANT

## 2020-11-23 PROCEDURE — 97161 PT EVAL LOW COMPLEX 20 MIN: CPT

## 2020-11-23 PROCEDURE — 93306 TTE W/DOPPLER COMPLETE: CPT | Performed by: INTERNAL MEDICINE

## 2020-11-23 PROCEDURE — 94660 CPAP INITIATION&MGMT: CPT

## 2020-11-23 PROCEDURE — 0 GADOBENATE DIMEGLUMINE 529 MG/ML SOLUTION: Performed by: INTERNAL MEDICINE

## 2020-11-23 PROCEDURE — 80061 LIPID PANEL: CPT | Performed by: PHYSICIAN ASSISTANT

## 2020-11-23 PROCEDURE — 63710000001 INSULIN LISPRO (HUMAN) PER 5 UNITS: Performed by: PHYSICIAN ASSISTANT

## 2020-11-23 PROCEDURE — 97165 OT EVAL LOW COMPLEX 30 MIN: CPT

## 2020-11-23 PROCEDURE — 83036 HEMOGLOBIN GLYCOSYLATED A1C: CPT | Performed by: PHYSICIAN ASSISTANT

## 2020-11-23 PROCEDURE — A9577 INJ MULTIHANCE: HCPCS | Performed by: INTERNAL MEDICINE

## 2020-11-23 RX ORDER — PANTOPRAZOLE SODIUM 40 MG/1
40 TABLET, DELAYED RELEASE ORAL DAILY
Status: DISCONTINUED | OUTPATIENT
Start: 2020-11-24 | End: 2020-11-24 | Stop reason: HOSPADM

## 2020-11-23 RX ORDER — ROSUVASTATIN CALCIUM 20 MG/1
40 TABLET, COATED ORAL NIGHTLY
Status: DISCONTINUED | OUTPATIENT
Start: 2020-11-23 | End: 2020-11-24 | Stop reason: HOSPADM

## 2020-11-23 RX ORDER — PANTOPRAZOLE SODIUM 40 MG/1
40 TABLET, DELAYED RELEASE ORAL ONCE
Status: COMPLETED | OUTPATIENT
Start: 2020-11-24 | End: 2020-11-23

## 2020-11-23 RX ADMIN — PANTOPRAZOLE SODIUM 40 MG: 40 TABLET, DELAYED RELEASE ORAL at 23:22

## 2020-11-23 RX ADMIN — ASPIRIN 325 MG ORAL TABLET 325 MG: 325 PILL ORAL at 09:17

## 2020-11-23 RX ADMIN — INSULIN DETEMIR 5 UNITS: 100 INJECTION, SOLUTION SUBCUTANEOUS at 20:47

## 2020-11-23 RX ADMIN — INSULIN LISPRO 4 UNITS: 100 INJECTION, SOLUTION INTRAVENOUS; SUBCUTANEOUS at 17:25

## 2020-11-23 RX ADMIN — INSULIN LISPRO 7 UNITS: 100 INJECTION, SOLUTION INTRAVENOUS; SUBCUTANEOUS at 12:24

## 2020-11-23 RX ADMIN — SODIUM CHLORIDE, PRESERVATIVE FREE 10 ML: 5 INJECTION INTRAVENOUS at 20:46

## 2020-11-23 RX ADMIN — ROSUVASTATIN CALCIUM 40 MG: 20 TABLET, COATED ORAL at 20:46

## 2020-11-23 RX ADMIN — INSULIN LISPRO 4 UNITS: 100 INJECTION, SOLUTION INTRAVENOUS; SUBCUTANEOUS at 09:17

## 2020-11-23 RX ADMIN — SODIUM CHLORIDE, PRESERVATIVE FREE 10 ML: 5 INJECTION INTRAVENOUS at 09:19

## 2020-11-23 RX ADMIN — GADOBENATE DIMEGLUMINE 20 ML: 529 INJECTION, SOLUTION INTRAVENOUS at 19:11

## 2020-11-23 NOTE — PROGRESS NOTES
Discharge Planning Assessment  [facility]     Patient Name: Dom Russell  MRN: [id number]  Today's Date: 11/23/2020    Admit Date: 11/22/2020    Discharge Needs Assessment     Row Name 11/23/20 0838       Living Environment    Lives With  spouse    Name(s) of Who Lives With Patient  Yadi Russell ( spouse) [phone]    Current Living Arrangements  home/apartment/condo    Primary Care Provided by  self    Provides Primary Care For  no one    Family Caregiver if Needed  spouse    Living Arrangement Comments  Arline( spouse)       Resource/Environmental Concerns    Resource/Environmental Concerns  none    Transportation Concerns  car, none       Transition Planning    Patient/Family Anticipates Transition to  home with family    Patient/Family Anticipated Services at Transition  none    Transportation Anticipated  family or friend will provide       Discharge Needs Assessment    Readmission Within the Last 30 Days  no previous admission in last 30 days    Equipment Currently Used at Home  none    Concerns to be Addressed  no discharge needs identified    Anticipated Changes Related to Illness  inability to work    Equipment Needed After Discharge  none        Discharge Plan     Row Name 11/23/20 0840       Plan    Plan  Home    Plan Comments  I met with Mr Russell and wife at bedside to initiate d/c planning. His plan is to return home. Prior to admit he was independent with all ADL's,uses no assistive equipment, works full time as an EMT. He has insurance coverage for RX meds. No d/c needs identified at this time,please advise if any arise.    Final Discharge Disposition Code  01 - home or self-care        Continued Care and Services - Admitted Since 11/22/2020    Coordination has not been started for this encounter.         Demographic Summary     Row Name 11/23/20 0836       General Information    Admission Type  observation    Arrived From  home    Referral Source  admission list    Reason for Consult  discharge  planning    Preferred Language  English    General Information Comments  PCP- Rigoberto Brand       Contact Information    Permission Granted to Share Info With  ;family/designee        Functional Status     Row Name 11/23/20 0837       Functional Status    Usual Activity Tolerance  good       Functional Status, IADL    Medications  independent    Meal Preparation  independent    Housekeeping  independent    Laundry  independent    Shopping  independent       Mental Status    General Appearance WDL  WDL       Mental Status Summary    Recent Changes in Mental Status/Cognitive Functioning  no changes       Employment/    Employment Status  employed full-time    Current or Previous Occupation  healthcare works as EMT at local [employer]    Employment/ Comments  insurance- [employer]        Psychosocial    No documentation.       Abuse/Neglect    No documentation.       Legal    No documentation.       Substance Abuse    No documentation.       Patient Forms    No documentation.           Sonja C Kellerman, RN

## 2020-11-23 NOTE — PLAN OF CARE
Problem: Adult Inpatient Plan of Care  Goal: Plan of Care Review  Recent Flowsheet Documentation  Taken 11/23/2020 1441 by Kimmy Ponce OT  Progress: improving  Plan of Care Reviewed With:   patient   spouse  Outcome Summary: OT eval completed. Pt. and wife report being at baseline with ADLs and functional mobility. Pt. demonstrated indepedence with bed mobility and T/Fs. Pt. with good safety and sequencing throughout. No further skilled OT services warranted, D/C OT. Recommend home with family at discharge.

## 2020-11-23 NOTE — THERAPY EVALUATION
Acute Care - Speech Language Pathology Initial Evaluation  [facility]   Cognitive-Communication Evaluation       Patient Name: Dom Russell  : 1980  MRN: [id number]  Today's Date: 2020               Admit Date: 2020     Visit Dx:    ICD-10-CM ICD-9-CM   1. TIA (transient ischemic attack)  G45.9 435.9   2. Upper respiratory tract infection, unspecified type  J06.9 465.9   3. History of diabetes mellitus  Z86.39 V12.29   4. History of tobacco use  Z87.891 V15.82     Patient Active Problem List   Diagnosis   • TIA (transient ischemic attack)   • Type 2 diabetes mellitus (CMS/HCC)   • Hyperlipidemia     Past Medical History:   Diagnosis Date   • Diabetes mellitus (CMS/HCC)    • Hyperlipidemia      Past Surgical History:   Procedure Laterality Date   • EAR TUBES     • HIP SURGERY     • SINUS SURGERY     • TONSILLECTOMY          SLP EVALUATION (last 72 hours)      SLP SLC Evaluation     Row Name 20 1230                   Communication Assessment/Intervention    Document Type  evaluation  -        Subjective Information  no complaints  -        Patient Observations  alert;cooperative;agree to therapy  -        Patient/Family/Caregiver Comments/Observations  Spouse present  -        Patient Effort  excellent  -           General Information    Patient Profile Reviewed  yes  -        Pertinent History Of Current Problem  Admitted on stroke pathway d/t word finding difficuties, dizziness, confusion. CT negative. SLC evaluation completed per stroke protocol.   -        Precautions/Limitations, Vision  WFL;for purposes of eval  -CH        Precautions/Limitations, Hearing  WFL;for purposes of eval  -CH        Prior Level of Function-Communication  WFL  -        Plans/Goals Discussed with  patient;spouse/S.O.;agreed upon  -        Barriers to Rehab  none identified  -        Patient's Goals for Discharge  return to home  -           Pain    Additional Documentation  Pain Scale:  FACES Pre/Post-Treatment (Group);Pain Scale: Numbers Pre/Post-Treatment (Group)  -           Pain Scale: Numbers Pre/Post-Treatment    Pretreatment Pain Rating  0/10 - no pain  -CH        Posttreatment Pain Rating  0/10 - no pain  -CH           Pain Scale: FACES Pre/Post-Treatment    Pain: FACES Scale, Pretreatment  0-->no hurt  -CH        Posttreatment Pain Rating  0-->no hurt  -CH           Comprehension Assessment/Intervention    Comprehension Assessment/Intervention  Auditory Comprehension;Reading Comprehension  -           Auditory Comprehension Assessment/Intervention    Auditory Comprehension (Communication)  WNL  -        Able to Identify Objects/Pictures (Communication)  familiar objects;WNL  -CH        Answers Questions (Communication)  yes/no;wh questions;personal;simple;concrete;mulit-unit;complex;abstract;WFL  -        Able to Follow Commands (Communication)  multi-step;WFL  -        Narrative Discourse  conversational level  -           Reading Comprehension Assessment/Intervention    Reading Comprehension (Communication)  WNL  -        Functional Reading Tasks  WNL  -           Expression Assessment/Intervention    Expression Assessment/Intervention  verbal expression  -           Verbal Expression Assessment/Intervention    Verbal Expression  WNL  -        Automatic Speech (Communication)  response to greeting;counting 1-20;WNL  -        Repetition  sentences;WNL  -CH        Phrase Completion  unpredictable;WNL  -        Responsive Naming  complex;WNL  -        Confrontational Naming  low frequency;WNL  -        Spontaneous/Functional Words  complex;WNL  -        Sentence Formulation  complex;WNL  -        Conversational Discourse/Fluency  WNL  -           Oral Motor Structure and Function    Oral Motor Structure and Function  WNL  -           Motor Speech Assessment/Intervention    Motor Speech Function  WNL  -           Cursory Voice Assessment/Intervention     Quality and Resonance (Voice)  WNL  -           Speaking Valve    Respiratory Status  WFL  -CH           Cognitive Assessment Intervention- SLP    Cognitive Function (Cognition)  WNL  -        Orientation Status (Cognition)  awareness of basic personal information;person;place;time;situation;WNL  -CH        Memory (Cognitive)  simple;functional;immediate;short-term;long-term;delayed;WNL  -CH        Attention (Cognitive)  selective;sustained;alternating;divided;attention to detail;WNL  -CH        Thought Organization (Cognitive)  concrete divergent;abstract divergent;concrete convergent;abstract convergent;drawing conclusions;verbal sequencing;WNL  -        Reasoning (Cognitive)  simple;deductive;WNL  -        Problem Solving (Cognitive)  simple;temporal;WNL  -        Functional Math (Cognitive)  simple;word problems;WNL  -        Executive Function (Cognition)  WNL  -        Pragmatics (Communication)  WN  -           SLP Clinical Impressions    SLP Diagnosis  No deficits identified with speech, language or cognition at this time  -        SLC Criteria for Skilled Therapy Interventions Met  no problems identified which require skilled intervention;baseline status  -        Functional Impact  no impact on function  -           Recommendations    Therapy Frequency (SLP SLC)  evaluation only  -        Anticipated Discharge Disposition (SLP)  home  -          User Key  (r) = Recorded By, (t) = Taken By, (c) = Cosigned By    Initials Name Effective Dates    Coleen Sierra MS CCC-SLP 08/09/20 -              EDUCATION  The patient has been educated in the following areas:     Cognitive Impairment Communication Impairment.    SLP Recommendation and Plan  SLP Diagnosis: No deficits identified with speech, language or cognition at this time        SLC Criteria for Skilled Therapy Interventions Met: no problems identified which require skilled intervention, baseline status  Anticipated  Discharge Disposition (SLP): home                                               Time Calculation:     Time Calculation- SLP     Row Name 11/23/20 1401             Time Calculation- SLP    SLP Start Time  1230  -      SLP Received On  11/23/20  -        User Key  (r) = Recorded By, (t) = Taken By, (c) = Cosigned By    Initials Name Provider Type    Coleen Sierra MS CCC-SLP Speech and Language Pathologist          Therapy Charges for Today     Code Description Service Date Service Provider Modifiers Qty    [id number] HC ST EVAL SPEECH AND PROD W LANG  3 11/23/2020 Coleen Vazquez MS CCC-SLP GN 1                     MS TABITHA Piper  11/23/2020     Patient was not wearing a face mask and did not exhibit coughing during this therapy encounter.  Procedure performed was aerosolizing, involved close contact (within 6 feet for at least 15 minutes or longer), and did not involve contact with infectious secretions or specimens.  Therapist used appropriate personal protective equipment including gloves, standard procedure mask and eye protection.  Appropriate PPE was worn during the entire therapy session.  Hand hygiene was completed before and after therapy session.

## 2020-11-23 NOTE — CONSULTS
Stroke Consult Note    Patient Name: Dom Russell   MRN: [id number]  Age: 40 y.o.  Sex: male  : 1980    Primary Care Physician: Rigoberto Brand MD  Referring Physician:  No ref. provider found      Handedness: R  Race:W     Chief Complaint/Reason for Consultation: speech difficutly    Subjective .  HPI:     Drew is a 40 RHWM with known medical diagnosis of hypertension, hyperlipidemia, diabetes type 2, obesity, tobacco abuse (1/2 pack/day), BERNARD on CPAP at night, and palpitations who was at his usual sate of health on 2020 @ 2230  presented to the emergency department  with complaints of dizziness.  He states that he awoke at 0900 and noted dizziness, which he describes as light headed, followed by inability to express.  This lasted for 30 min. The wife reports ? Left arm weakness.       Takes aspirin 81 daily       Last Known Normal Date/Time: 2020 @ 2330 EST     Review of Systems   Constitutional: No fatigue  HENT: Negative for nosebleeds and rhinorrhea.    Eyes: Negative for redness.   Respiratory: Negative for cough.    Gastrointestinal: Negative for anal bleeding.   Endocrine: Negative for polydipsia.   Genitourinary: Negative for enuresis and urgency.   Musculoskeletal: Negative for joint swelling.   Neurological: Negative for tremors.   Psychiatric/Behavioral: Negative for hallucinations.     Temp:  [97.8 °F (36.6 °C)-98.3 °F (36.8 °C)] 98.2 °F (36.8 °C)  Heart Rate:  [56-77] 56  Resp:  [16-18] 18  BP: (123-155)/(59-99) 123/72        GEN: obese,  pleasant, cooperative  Eyes-show anicteric sclera, moist conjunctiva with no lid lag, no redness  Neck-trachea midline.  There is no thyromegaly.  ENMT-oropharynx clear with moist mucous membranes and good dentition.  Skin-no rash, lesions or ulcers.  Cardiovascular exam-no pedal edema, regular rate and rhythm.  CHEST: No signs of resp distress, on room air  Abdomen-no abdominal distention, nontender.  Psychiatric exam-alert oriented  x3 with intact judgment and insight      NEURO    MENTAL STATUS: AAOx3, memory intact, fund of knowledge appropriate    LANG/SPEECH: Naming and repetition intact, fluent, follows 3-step commands    CRANIAL NERVES:      II: Pupils equal and reactive, no RAPD, no VF deficits, fundus(not done)      III, IV, VI: EOM intact, no gaze preference or deviation, no nystagmus.      V: normal sensation in V1, V2, and V3 segments bilaterally      VII: no asymmetry, no nasolabial fold flattening      VIII: normal hearing to speech      IX, X: normal palatal elevation, no uvular deviation      XI: 5/5 head turn and 5/5 shoulder shrug bilaterally      XII: midline tongue protrusion    MOTOR:  Normal tone throughout  5/5 muscle power in Rt shoulder abductors/adductors, elbow flexors/extensors, wrist flexors/extensors, finger abductors/adductors.  5/5 in Rt hip flexors/extensors, knee flexors/extensors, ankle dorsiflexors and plantar flexors.    5/5 muscle power in Lt shoulder abductors/adductors, elbow flexors/extensors, wrist flexors/extensors, finger abductors/adductors.  5/5 in Lt hip flexors/extensors, knee flexors/extensors, ankle dorsiflexors and plantea flexors.    REFLEXES:  no Chaparro's, no clonus    SENSORY:    Normal to light touch all throughout     COORDINATION: Normal finger to nose and heel to shin, no tremor, no dysmetria    STATION: Not assessed due to patient condition    GAIT: Not assessed due to patient condition  Acute Stroke Data    Alteplase (tPA) Inclusion / Exclusion Criteria    Time: 11:36 EST  Person Administering Scale: Len Klein MD    Inclusion Criteria  [x]   18 years of age or greater   []   Onset of symptoms < 4.5 hours before beginning treatment (stroke onset = time patient was last seen well or without symptoms).   []   Diagnosis of acute ischemic stroke causing measurable disabling deficit (Complete Hemianopia, Any Aphasia, Visual or Sensory Extinction, Any weakness limiting sustained  effort against gravity)   []   Any remaining deficit considered potentially disabling in view of patient and practitioner   Exclusion criteria (Do not proceed with Alteplase if any are checked under exclusion criteria)  []   Onset unknown or GREATER than 4.5 hours   []   ICH on CT/MRI   []   CT demonstrates hypodensity representing acute or subacute infarct   []   Significant head trauma or prior stroke in the previous 3 months   []   Symptoms suggestive of subarachnoid hemorrhage   []   History of un-ruptured intracranial aneurysm GREATER than 10 mm   []   Recent intracranial or intraspinal surgery within the last 3 months   []   Arterial puncture at a non-compressible site in the previous 7 days   []   Active internal bleeding   []   Acute bleeding tendency   []   Platelet count LESS than 100,000 for known hematological diseases such as leukemia, thrombocytopenia or chronic cirrhosis   []   Current use of anticoagulant with INR GREATER than 1.7 or PT GREATER than 15 seconds, aPTT GREATER than 40 seconds   []   Heparin received within 48 hours, resulting in abnormally elevated aPTT GREATER than upper limit of normal   []   Current use of direct thrombin inhibitors or direct factor Xa inhibitors in the past 48 hours   []   Elevated blood pressure refractory to treatment (systolic GREATER than 185 mm/Hg or diastolic  GREATER than 110 mm/Hg   []   Suspected infective endocarditis and aortic arch dissection   []   Current use of therapeutic treatment dose of low-molecular-weight heparin (LMWH) within the previous 24 hours   []   Structural GI malignancy or bleed   Relative exclusion for all patients  [x]   Only minor non-disabling symptoms   []   Pregnancy   []   Seizure at onset with postictal residual neurological impairments   []   Major surgery or previous trauma within past 14 days   []   History of previous spontaneous ICH, intracranial neoplasm, or AV malformation   []   Postpartum (within previous 14 days)   []    Recent GI or urinary tract hemorrhage (within previous 21 days)   []   Recent acute MI (within previous 3 months)   []   History of un-ruptured intracranial aneurysm LESS than 10 mm   []   History of ruptured intracranial aneurysm   []   Blood glucose LESS than 50 mg/dL (2.7 mmol/L)   []   Dural puncture within the last 7 days   []   Known GREATER than 10 cerebral microbleeds   Additional exclusions for patients with symptoms onset between 3 and 4.5 hours.  []   Age > 80.   []   On any anticoagulants regardless of INR  >>> Warfarin (Coumadin), Heparin, Enoxaparin (Lovenox), fondaparinux (Arixtra), bivalirudin (Angiomax), Argatroban, dabigatran (Pradaxa), rivaroxaban (Xarelto), or apixaban (Eliquis)   []   Severe stroke (NIHSS > 25).   []   History of BOTH diabetes and previous ischemic stroke.   []   The risks and benefits have been discussed with the patient or family related to the administration of IV Alteplase for stroke symptoms.   []   I have discussed and reviewed the patient's case and imaging with the attending prior to IV Alteplase.    Time Alteplase administered       Past Medical History:   Diagnosis Date   • Diabetes mellitus (CMS/HCC)    • Hyperlipidemia      Past Surgical History:   Procedure Laterality Date   • EAR TUBES     • HIP SURGERY     • SINUS SURGERY     • TONSILLECTOMY       History reviewed. No pertinent family history.  Social History     Socioeconomic History   • Marital status:      Spouse name: Not on file   • Number of children: Not on file   • Years of education: Not on file   • Highest education level: Not on file   Tobacco Use   • Smoking status: Current Every Day Smoker     Packs/day: 0.50     Types: Cigarettes   • Smokeless tobacco: Never Used   Substance and Sexual Activity   • Alcohol use: Not Currently   • Drug use: Never   • Sexual activity: Defer     Allergies   Allergen Reactions   • Clarithromycin Hives   • Codeine Hives   • Fortaz [Ceftazidime] Hives   •  Penicillins Hives   • Unasyn [Ampicillin-Sulbactam Sodium] Hives     Prior to Admission medications    Medication Sig Start Date End Date Taking? Authorizing Provider   aspirin 81 MG chewable tablet Chew 81 mg Daily.   Yes Chris Juarez MD   Empagliflozin (Jardiance) 25 MG tablet Take 1 tablet by mouth Daily.   Yes Chris Juarez MD   fenofibrate micronized (LOFIBRA) 134 MG capsule Take 134 mg by mouth Every Morning Before Breakfast.   Yes Chris Juarez MD   lansoprazole (PREVACID) 30 MG capsule Take 30 mg by mouth Daily.   Yes Chris Juarez MD   metFORMIN (GLUCOPHAGE) 1000 MG tablet Take 2,000 mg by mouth Daily With Breakfast.   Yes Chris Juarez MD   metoprolol succinate XL (TOPROL-XL) 100 MG 24 hr tablet Take 100 mg by mouth Daily.   Yes Chris Juarez MD   Olmesartan-amLODIPine-HCTZ (Tribenzor) 40-5-25 MG tablet Take 1 tablet by mouth Daily.   Yes Chris Juarez MD   rosuvastatin (CRESTOR) 20 MG tablet Take 20 mg by mouth Daily.   Yes Chris Juarez MD   SITagliptin (JANUVIA) 100 MG tablet Take 100 mg by mouth Daily.   Yes Chris Juarez MD       [facility] Meds:  Scheduled- aspirin, 325 mg, Oral, Daily    Or  aspirin, 300 mg, Rectal, Daily  atorvastatin, 80 mg, Oral, Nightly  insulin lispro, 0-9 Units, Subcutaneous, TID AC  sodium chloride, 10 mL, Intravenous, Q12H      Infusions-     PRNs- •  acetaminophen **OR** acetaminophen  •  dextrose  •  dextrose  •  glucagon (human recombinant)  •  sodium chloride  •  [COMPLETED] Insert peripheral IV **AND** sodium chloride  •  sodium chloride    Functional Status Prior to Current Stroke/Zaucena Score: 0    NIH Stroke Scale  Time: 11:36 EST  Person Administering Scale: Len Klein MD    1a  Level of consciousness: 0=alert; keenly responsive   1b. LOC questions:  0=Performs both tasks correctly   1c. LOC commands: 0=Performs both tasks correctly   2.  Best Gaze: 0=normal   3.  Visual: 0=No visual loss    4. Facial Palsy: 0=Normal symmetric movement   5a.  Motor left arm: 0=No drift, limb holds 90 (or 45) degrees for full 10 seconds   5b.  Motor right arm: 0=No drift, limb holds 90 (or 45) degrees for full 10 seconds   6a. motor left le=No drift, limb holds 90 (or 45) degrees for full 10 seconds   6b  Motor right le=No drift, limb holds 90 (or 45) degrees for full 10 seconds   7. Limb Ataxia: 0=Absent   8.  Sensory: 0=Normal; no sensory loss   9. Best Language:  0=No aphasia, normal   10. Dysarthria: 0=Normal   11. Extinction and Inattention: 0=No abnormality    Total:   0       Results Reviewed:  I have personally reviewed current lab, radiology, and data and agree with results.               Assessment/Plan:      Drew is a 40 RHWM with known medical diagnosis of hypertension, hyperlipidemia, diabetes type 2, obesity, tobacco abuse (1/2 pack/day), BERNARD on CPAP at night, and palpitations who was at his usual sate of health on 2020 @ 2230  presented to the emergency department  with complaints of lightheadedness associated with transient episode of expressive aphasia and confusion.       CTA head and Neck - no flow limiting stenosis. MRI brain evidence no acute infarct, but there is subtle T2 Flair hyperintensity signal in left temporal lobe, likely an artifact. His exam is normal today on my evaluation.     Overall, his symptoms could be attributed to orthostatic hypotension vs autonomic dysfunction. Less likely this is  TIA/ Stroke.     1. Transient neurologic dysfunction     Plan  -Repeat MRI Brain W contrast for concern of left temporal signal change( likely an artifact)  -EEG for an episode of confusion, for any seizure tendencies or epileptiform discharges  -Orthostatic measurements   -continue aspirin 81, and recommend aggressive vascular risk factor control    2. DM2- A1c 10, need Diabetic NP education    3. Hypertension- Normalize BP     4. BERNARD-Continue CPAP    5. Morbid obesity- educated on  weight loss    6. Tobacco abuse- counseled on smoking cessation    7. Disposition- If MRI with contrast and EEG is negative, patient can be discharged from neurology stand point.                 Len Klein MD  November 23, 2020  11:36 EST    Verbal consent taken.  Patient agreeable to be seen via telemedicine.    This was an audio and video enabled telemedicine encounter.

## 2020-11-23 NOTE — PLAN OF CARE
Problem: Adult Inpatient Plan of Care  Goal: Plan of Care Review  Outcome: Ongoing, Progressing  Flowsheets (Taken 11/23/2020 1404)  Plan of Care Reviewed With:   patient   spouse    SLP evaluation completed. Will sign-off as no deficits identified with speech, language or cognition at this time. Please see note for further details and recommendations.

## 2020-11-23 NOTE — THERAPY DISCHARGE NOTE
Acute Care - Occupational Therapy Discharge  [facility]    Patient Name: Dom Russell  : 1980    MRN: [id number]                              Today's Date: 2020       Admit Date: 2020    Visit Dx:     ICD-10-CM ICD-9-CM   1. TIA (transient ischemic attack)  G45.9 435.9   2. Upper respiratory tract infection, unspecified type  J06.9 465.9   3. History of diabetes mellitus  Z86.39 V12.29   4. History of tobacco use  Z87.891 V15.82     Patient Active Problem List   Diagnosis   • TIA (transient ischemic attack)   • Type 2 diabetes mellitus (CMS/HCC)   • Hyperlipidemia     Past Medical History:   Diagnosis Date   • Diabetes mellitus (CMS/HCC)    • Hyperlipidemia      Past Surgical History:   Procedure Laterality Date   • EAR TUBES     • HIP SURGERY     • SINUS SURGERY     • TONSILLECTOMY       General Information     Row Name 20 1435          OT Time and Intention    Document Type  evaluation  -     Mode of Treatment  occupational therapy  -     Row Name 20 1435          General Information    Patient Profile Reviewed  yes  -     Prior Level of Function  independent:;all household mobility;community mobility;transfer;ADL's;bed mobility;home management;cooking;cleaning;driving;work  -     Existing Precautions/Restrictions  no known precautions/restrictions  -     Barriers to Rehab  none identified  -     Row Name 20 1435          Living Environment    Lives With  spouse  -     Row Name 20 1435          Home Main Entrance    Number of Stairs, Main Entrance  three  -     Stair Railings, Main Entrance  railing on left side (ascending)  -     Row Name 20 1435          Stairs Within Home, Primary    Number of Stairs, Within Home, Primary  other (see comments)  -     Stairs Comment, Within Home, Primary  1 flight of steps to basement  -     Row Name 20 1435          Cognition    Orientation Status (Cognition)  oriented x 4  -     Row Name  11/23/20 1435          Safety Issues, Functional Mobility    Safety Issues Affecting Function (Mobility)  -- NONE  -       User Key  (r) = Recorded By, (t) = Taken By, (c) = Cosigned By    Initials Name Provider Type    Kimmy Whiteside OT Occupational Therapist        Mobility/ADL's     Row Name 11/23/20 1437          Bed Mobility    Bed Mobility  bed mobility (all) activities  -     All Activities, Ashley (Bed Mobility)  independent  -     Comment (Bed Mobility)  Pt. demonstrated good safety and sequencing  -     Row Name 11/23/20 1437          Transfers    Transfers  sit-stand transfer;bed-chair transfer  -     Bed-Chair Ashley (Transfers)  independent  -     Assistive Device (Bed-Chair Transfers)  other (see comments) Gait Belt  -     Sit-Stand Ashley (Transfers)  independent  -     Row Name 11/23/20 1437          Sit-Stand Transfer    Assistive Device (Sit-Stand Transfers)  other (see comments) Gait Belt  -     Row Name 11/23/20 1437          Activities of Daily Living    56517 - OT Self Care/Mgmt Minutes  -- Pt. reports being at baseline with ADLs  -     BADL Assessment/Intervention  lower body dressing;toileting;grooming  -     Row Name 11/23/20 1437          Lower Body Dressing Assessment/Training    Ashley Level (Lower Body Dressing)  lower body dressing skills;independent  -     Row Name 11/23/20 1437          Toileting Assessment/Training    Ashley Level (Toileting)  toileting skills;independent  -     Comment (Toileting)  Pt. and wife report completing task prior to OT arrival with independence.  -     Row Name 11/23/20 1437          Grooming Assessment/Training    Ashley Level (Grooming)  grooming skills;independent  -       User Key  (r) = Recorded By, (t) = Taken By, (c) = Cosigned By    Initials Name Provider Type    Kimmy Whiteside OT Occupational Therapist        Obj/Interventions     Row Name 11/23/20 1440          Sensory  Assessment (Somatosensory)    Sensory Assessment (Somatosensory)  UE sensation intact  -LC     Row Name 11/23/20 1440          Vision Assessment/Intervention    Visual Impairment/Limitations  WFL  -LC     Row Name 11/23/20 1440          Range of Motion Comprehensive    General Range of Motion  bilateral upper extremity ROM WFL  -LC     Row Name 11/23/20 1440          Strength Comprehensive (MMT)    General Manual Muscle Testing (MMT) Assessment  no strength deficits identified  -LC     Row Name 11/23/20 1440          Balance    Balance Assessment  sitting static balance;sitting dynamic balance;standing static balance;standing dynamic balance  -     Static Sitting Balance  WNL;unsupported;sitting in chair  -     Dynamic Sitting Balance  WNL;unsupported;sitting in chair  -     Static Standing Balance  WNL;unsupported;standing  -     Dynamic Standing Balance  WNL;unsupported;standing  -       User Key  (r) = Recorded By, (t) = Taken By, (c) = Cosigned By    Initials Name Provider Type     Kimmy Ponce, UDAY Occupational Therapist        Goals/Plan    No documentation.       Clinical Impression     Row Name 11/23/20 [facility]          Pain Assessment    Additional Documentation  Pain Scale: Numbers Pre/Post-Treatment (Group)  -LC     Row Name 11/23/20 1441          Pain Scale: Numbers Pre/Post-Treatment    Pretreatment Pain Rating  0/10 - no pain  -     Posttreatment Pain Rating  0/10 - no pain  -     Pain Intervention(s)  Repositioned;Ambulation/increased activity  -LC     Row Name 11/23/20 [facility]          Plan of Care Review    Plan of Care Reviewed With  patient;spouse  -     Progress  improving  -     Outcome Summary  OT eval completed. Pt. and wife report being at baseline with ADLs and functional mobility. Pt. demonstrated indepedence with bed mobility and T/Fs. Pt. with good safety and sequencing throughout. No further skilled OT services warranted, D/C OT. Recommend home with family at discharge.   -     Row Name 11/23/20 1441          Therapy Assessment/Plan (OT)    Therapy Frequency (OT)  evaluation only  -     Row Name 11/23/20 1441          Therapy Plan Review/Discharge Plan (OT)    Anticipated Discharge Disposition (OT)  home with assist  -     Row Name 11/23/20 1441          Vital Signs    Pre Systolic BP Rehab  164  -LC     Pre Treatment Diastolic BP  68  -LC     Post Systolic BP Rehab  177  -LC     Post Treatment Diastolic BP  92  -LC     Pretreatment Heart Rate (beats/min)  68  -LC     Intratreatment Heart Rate (beats/min)  69  -LC     Pre Patient Position  Sitting  -     Intra Patient Position  Standing  -LC     Post Patient Position  Supine  -     Row Name 11/23/20 1441          Positioning and Restraints    Pre-Treatment Position  sitting in chair/recliner  -LC     Post Treatment Position  bed  -LC     In Bed  notified nsg;roshanwoctavio;call light within reach;encouraged to call for assist;with other staff  -       User Key  (r) = Recorded By, (t) = Taken By, (c) = Cosigned By    Initials Name Provider Type    Kimmy Whiteside OT Occupational Therapist        Outcome Measures     Row Name 11/23/20 1446          How much help from another is currently needed...    Putting on and taking off regular lower body clothing?  4  -LC     Bathing (including washing, rinsing, and drying)  4  -LC     Toileting (which includes using toilet bed pan or urinal)  4  -LC     Putting on and taking off regular upper body clothing  4  -LC     Taking care of personal grooming (such as brushing teeth)  4  -LC     Eating meals  4  -LC     AM-PAC 6 Clicks Score (OT)  24  -     Row Name 11/23/20 1446          Functional Assessment    Outcome Measure Options  AM-PAC 6 Clicks Daily Activity (OT)  -       User Key  (r) = Recorded By, (t) = Taken By, (c) = Cosigned By    Initials Name Provider Type    Kimmy Whiteside OT Occupational Therapist        Occupational Therapy Education                 Title: PT OT SLP  Therapies (In Progress)     Topic: Occupational Therapy (In Progress)     Point: ADL training (Done)     Description:   Instruct learner(s) on proper safety adaptation and remediation techniques during self care or transfers.   Instruct in proper use of assistive devices.              Learning Progress Summary           Patient Acceptance, E,D, VU,DU by  at 11/23/2020 1411   Significant Other Acceptance, E,D, VU,DU by  at 11/23/2020 1411                   Point: Home exercise program (Not Started)     Description:   Instruct learner(s) on appropriate technique for monitoring, assisting and/or progressing therapeutic exercises/activities.              Learner Progress:  Not documented in this visit.          Point: Precautions (Done)     Description:   Instruct learner(s) on prescribed precautions during self-care and functional transfers.              Learning Progress Summary           Patient Acceptance, E,D, VU,DU by  at 11/23/2020 1411   Significant Other Acceptance, E,D, VU,DU by  at 11/23/2020 1411                   Point: Body mechanics (Done)     Description:   Instruct learner(s) on proper positioning and spine alignment during self-care, functional mobility activities and/or exercises.              Learning Progress Summary           Patient Acceptance, E,D, VU,DU by  at 11/23/2020 1411   Significant Other Acceptance, E,D, VU,DU by  at 11/23/2020 1411                               User Key     Initials Effective Dates Name Provider Type Discipline     07/18/19 -  Kimmy Ponce OT Occupational Therapist OT              OT Recommendation and Plan  Retired Outcome Summary/Treatment Plan (OT)  Anticipated Discharge Disposition (OT): home with assist  Therapy Frequency (OT): evaluation only  Plan of Care Review  Plan of Care Reviewed With: patient, spouse  Progress: improving  Outcome Summary: OT eval completed. Pt. and wife report being at baseline with ADLs and functional mobility. Pt.  demonstrated indepedence with bed mobility and T/Fs. Pt. with good safety and sequencing throughout. No further skilled OT services warranted, D/C OT. Recommend home with family at discharge.  Plan of Care Reviewed With: patient, spouse  Outcome Summary: OT eval completed. Pt. and wife report being at baseline with ADLs and functional mobility. Pt. demonstrated indepedence with bed mobility and T/Fs. Pt. with good safety and sequencing throughout. No further skilled OT services warranted, D/C OT. Recommend home with family at discharge.     Time Calculation:   Time Calculation- OT     Row Name 11/23/20 1447 11/23/20 1437          Time Calculation- OT    OT Start Time  1411  -  --     OT Received On  11/23/20  -  --     OT Goal Re-Cert Due Date  12/03/20  -  --        Timed Charges    19169 - OT Self Care/Mgmt Minutes  --  -- Pt. reports being at baseline with ADLs  -LC       User Key  (r) = Recorded By, (t) = Taken By, (c) = Cosigned By    Initials Name Provider Type     Kimmy Ponce OT Occupational Therapist        Therapy Charges for Today     Code Description Service Date Service Provider Modifiers Qty    38118964850  OT EVAL LOW COMPLEXITY 4 11/23/2020 Kimmy Ponce OT GO 1               Kimmy Ponce OT  11/23/2020

## 2020-11-23 NOTE — PLAN OF CARE
Problem: Adult Inpatient Plan of Care  Goal: Plan of Care Review  Recent Flowsheet Documentation  Taken 11/23/2020 1413 by Hope Wang PT  Progress: improving  Plan of Care Reviewed With:   patient   spouse  Outcome Summary: PT eval complete.  PT independent with bed mobility and transfers.  Independently ambulated in hallway and in room with no AD.  Pt ambulated up and down 12 steps with no HR, no LOB, and no dizziness.  Reports noHA with mobility.  Recommend DC home with family upon DC, no skilled PT warranted.

## 2020-11-23 NOTE — PLAN OF CARE
"Goal Outcome Evaluation:  Plan of Care Reviewed With: patient, spouse  Progress: improving  Outcome Summary: Pt A&O, NIH -0-.  VSS on RA, home antihypertensives held.  Pt continues to c/o mild \"burning\" with deep inspiration.  EEG and echo done at bedside,  Orthostatic BPs checked.   Up adlib in room.  WOC ordered, see wound care orders for feet.  Valerio added for 2100.  To MRI at 1845.  "

## 2020-11-23 NOTE — NURSING NOTE
"WOC consult for POA wounds on right great toe and right plantar surface.    Per patient, he sees MD at home who is treating/debriding for plantar warts.    Both areas are dry/callused-present as possible neuropathic ulcerations. Patient states he has \"mild\" neuropathy.     There is also a dry/callused area on left plantar surface-no open areas noted.    Cleaned with sterile normal saline-swabbed with betadine and left NADIA-may cover with foam dressing for ambulation if preferred.     No other concerns at present. [facility] will continue to follow-please notify [facility] for questions or concerns. Thank you.   "

## 2020-11-23 NOTE — THERAPY DISCHARGE NOTE
Patient Name: Dom Russell  : 1980    MRN: [id number]                              Today's Date: 2020       Admit Date: 2020    Visit Dx:     ICD-10-CM ICD-9-CM   1. TIA (transient ischemic attack)  G45.9 435.9   2. Upper respiratory tract infection, unspecified type  J06.9 465.9   3. History of diabetes mellitus  Z86.39 V12.29   4. History of tobacco use  Z87.891 V15.82     Patient Active Problem List   Diagnosis   • TIA (transient ischemic attack)   • Type 2 diabetes mellitus (CMS/HCC)   • Hyperlipidemia     Past Medical History:   Diagnosis Date   • Diabetes mellitus (CMS/HCC)    • Hyperlipidemia      Past Surgical History:   Procedure Laterality Date   • EAR TUBES     • HIP SURGERY     • SINUS SURGERY     • TONSILLECTOMY       General Information     Row Name 20 1413          Physical Therapy Time and Intention    Document Type  discharge evaluation/summary  -AA     Mode of Treatment  physical therapy  -AA     Row Name 20 1413          General Information    Patient Profile Reviewed  yes  -AA     Prior Level of Function  independent:;all household mobility;community mobility;gait;using stairs;work;driving  -AA     Existing Precautions/Restrictions  no known precautions/restrictions  -AA     Barriers to Rehab  none identified  -AA     Row Name 20 1413          Living Environment    Lives With  spouse  -AA     Row Name 20 1413          Home Main Entrance    Number of Stairs, Main Entrance  three  -AA     Stair Railings, Main Entrance  railing on left side (ascending)  -AA     Row Name 20 1413          Stairs Within Home, Primary    Stairs, Within Home, Primary  12  -AA     Stair Railings, Within Home, Primary  railing on left side (ascending)  -AA     Row Name 20 1413          Cognition    Orientation Status (Cognition)  oriented x 4  -AA     Row Name 20 1413          Safety Issues, Functional Mobility    Safety Issues Affecting Function (Mobility)   other (see comments) none  -AA     Impairments Affecting Function (Mobility)  other (see comments) none  -AA       User Key  (r) = Recorded By, (t) = Taken By, (c) = Cosigned By    Initials Name Provider Type    Hope Barrett PT Physical Therapist        Mobility     Row Name 11/23/20 1413          Bed Mobility    Bed Mobility  bed mobility (all) activities  -AA     All Activities, [place] (Bed Mobility)  independent  -AA     Comment (Bed Mobility)  good safety and sequencing  -AA     Row Name 11/23/20 1413          Transfers    Comment (Transfers)  good safety and sequencing  -AA     Row Name 11/23/20 1413          Bed-Chair Transfer    Bed-Chair [place] (Transfers)  independent  -AA     Assistive Device (Bed-Chair Transfers)  other (see comments) none  -AA     Row Name 11/23/20 1413          Sit-Stand Transfer    Sit-Stand [place] (Transfers)  independent  -AA     Assistive Device (Sit-Stand Transfers)  other (see comments) none  -AA     Row Name 11/23/20 1413          Gait/Stairs (Locomotion)    [place] Level (Gait)  independent  -AA     Assistive Device (Gait)  other (see comments) none  -AA     Distance in Feet (Gait)  500'  -AA     Deviations/Abnormal Patterns (Gait)  other (see comments)  -AA     [place] Level (Stairs)  independent  -AA     Assistive Device (Stairs)  other (see comments) none  -AA     Handrail Location (Stairs)  none  -AA     Number of Steps (Stairs)  12  -AA     Ascending Technique (Stairs)  step-over-step  -AA     Descending Technique (Stairs)  step-over-step  -AA     Comment (Gait/Stairs)  Pt ambulated in hallway and in room with no safety concerns, LOB, dizziness, or HA.  navigate obstacles easily.  Pt ambulated up and down 12 steps with no HR, step over pattern, and no LOB  -AA       User Key  (r) = Recorded By, (t) = Taken By, (c) = Cosigned By    Initials Name Provider Type    Hope Barrett PT Physical Therapist        Obj/Interventions     Row Name  11/23/20 1413          Range of Motion Comprehensive    General Range of Motion  bilateral lower extremity ROM WNL  -[facility] Name 11/23/20 1413          Strength Comprehensive (MMT)    Comment, General Manual Muscle Testing (MMT) Assessment  5/5 BLE  -[facility] Name 11/23/20 1413          Balance    Balance Assessment  sitting static balance;sitting dynamic balance;standing static balance;standing dynamic balance  -     Static Sitting Balance  WNL;unsupported;sitting, edge of bed  -AA     Dynamic Sitting Balance  WNL;unsupported;sitting, edge of bed  -AA     Static Standing Balance  WNL;unsupported;standing  -AA     Dynamic Standing Balance  WNL;unsupported;standing  -AA     [facility] Name 11/23/20 1413          Sensory Assessment (Somatosensory)    Sensory Assessment (Somatosensory)  LE sensation intact  -       User Key  (r) = Recorded By, (t) = Taken By, (c) = Cosigned By    Initials Name Provider Type    AA Hope Wang, PT Physical Therapist        Goals/Plan    No documentation.       Clinical Impression     [facility] Name 11/23/20 1413          Pain    Additional Documentation  Pain Scale: Numbers Pre/Post-Treatment (Group)  -AA     Row Name 11/23/20 1413          Pain Scale: Numbers Pre/Post-Treatment    Pretreatment Pain Rating  0/10 - no pain  -     Posttreatment Pain Rating  0/10 - no pain  -AA     Row Name 11/23/20 1413          Plan of Care Review    Plan of Care Reviewed With  patient;spouse  -     Progress  improving  -     Outcome Summary  PT eval complete.  PT independent with bed mobility and transfers.  Independently ambulated in hallway and in room with no AD.  Pt ambulated up and down 12 steps with no HR, no LOB, and no dizziness.  Reports noHA with mobility.  Recommend DC home with family upon DC, no skilled PT warranted.  -     Row Name 11/23/20 1413          Therapy Assessment/Plan (PT)    Criteria for Skilled Interventions Met (PT)  no;no problems identified which require skilled  intervention  -     Row Name 11/23/20 1413          Vital Signs    Pre Systolic BP Rehab  164  -AA     Pre Treatment Diastolic BP  68  -AA     Post Systolic BP Rehab  177  -AA     Post Treatment Diastolic BP  92  -AA     Pre Patient Position  Sitting  -AA     Intra Patient Position  Standing  -AA     Post Patient Position  Sitting  -AA     Row Name 11/23/20 1413          Positioning and Restraints    Pre-Treatment Position  sitting in chair/recliner  -AA     Post Treatment Position  bed  -AA     In Bed  notified nsg;sitting EOB;call light within reach;with family/caregiver;with other staff  -AA       User Key  (r) = Recorded By, (t) = Taken By, (c) = Cosigned By    Initials Name Provider Type    Hope Barrett, PT Physical Therapist        Outcome Measures     Row Name 11/23/20 1413          How much help from another person do you currently need...    Turning from your back to your side while in flat bed without using bedrails?  4  -AA     Moving from lying on back to sitting on the side of a flat bed without bedrails?  4  -AA     Moving to and from a bed to a chair (including a wheelchair)?  4  -AA     Standing up from a chair using your arms (e.g., wheelchair, bedside chair)?  4  -AA     Climbing 3-5 steps with a railing?  4  -AA     To walk in hospital room?  4  -AA     AM-PAC 6 Clicks Score (PT)  24  -     Row Name 11/23/20 1413          Modified Burden Scale    Pre-Stroke Modified Burden Scale  0 - No Symptoms at all.  -AA     Modified Burden Scale  0 - No Symptoms at all.  -     Row Name 11/23/20 1413          Functional Assessment    Outcome Measure Options  AM-PAC 6 Clicks Basic Mobility (PT);Modified Burden  -AA       User Key  (r) = Recorded By, (t) = Taken By, (c) = Cosigned By    Initials Name Provider Type    Hope Barrett PT Physical Therapist          PT Recommendation and Plan     Plan of Care Reviewed With: patient, spouse  Progress: improving  Outcome Summary: PT eval complete.  PT  independent with bed mobility and transfers.  Independently ambulated in hallway and in room with no AD.  Pt ambulated up and down 12 steps with no HR, no LOB, and no dizziness.  Reports noHA with mobility.  Recommend DC home with family upon DC, no skilled PT warranted.     Time Calculation:   PT Charges     Row Name 11/23/20 1413             Time Calculation    Start Time  1413  -AA      PT Received On  11/23/20  -AA        User Key  (r) = Recorded By, (t) = Taken By, (c) = Cosigned By    Initials Name Provider Type    AA Hope Wang, PT Physical Therapist        Therapy Charges for Today     Code Description Service Date Service Provider Modifiers Qty    [id number] HC PT EVAL LOW COMPLEXITY 4 11/23/2020 Hope Wang, PT GP 1          PT G-Codes  Outcome Measure Options: AM-PAC 6 Clicks Daily Activity (OT)  AM-PAC 6 Clicks Score (PT): 24  AM-PAC 6 Clicks Score (OT): 24  Modified Gordon Scale: 0 - No Symptoms at all.    PT Discharge Summary  Anticipated Discharge Disposition (PT): home with assist    Hope Wang PT  11/23/2020

## 2020-11-23 NOTE — PROGRESS NOTES
[facility] Medicine Services  PROGRESS NOTE    Patient Name: Dom Russell  : 1980  MRN: [id number]    Date of Admission: 2020  Primary Care Physician: Rigoberto Brand MD    Subjective   Subjective     CC:  F/u episode of confusion/speech difficulties    HPI:  Patient resting in bedside chair, no further episodes. Discussed need for aggressive lifetstyle modification going forward. Patient aware.    Review of Systems  Gen- No fevers, chills  CV- No chest pain, palpitations  Resp- No cough, dyspnea  GI- No N/V/D, abd pain    Objective   Objective     Vital Signs:   Temp:  [98 °F (36.7 °C)-98.3 °F (36.8 °C)] 98.1 °F (36.7 °C)  Heart Rate:  [56-73] 73  Resp:  [16-18] 16  BP: (123-164)/(59-97) 164/97  Total (NIH Stroke Scale): 0     Physical Exam:  Constitutional: No acute distress, awake, alert, obese male  HENT: NCAT, mucous membranes moist  Respiratory: Clear to auscultation bilaterally, respiratory effort normal   Cardiovascular: RRR, no murmurs, rubs, or gallops, palpable pedal pulses bilaterally  Gastrointestinal: Positive bowel sounds, soft, nontender, nondistended  Musculoskeletal: No bilateral ankle edema  Psychiatric: Appropriate affect, cooperative  Neurologic: Oriented x 3, strength symmetric in all extremities, Cranial Nerves grossly intact to confrontation, speech clear  Skin: No rashes      Results Reviewed:  Results from last 7 days   Lab Units 20  1149 20  1148   WBC 10*3/mm3  --  8.17   HEMOGLOBIN g/dL  --  16.7   HEMATOCRIT %  --  50.2   PLATELETS 10*3/mm3  --  190   INR  1.07  --      Results from last 7 days   Lab Units 20  1357 20  1149   SODIUM mmol/L  --  137   POTASSIUM mmol/L  --  4.3   CHLORIDE mmol/L  --  99   CO2 mmol/L  --  24.0   BUN mg/dL  --  23*   CREATININE mg/dL  --  0.69*   GLUCOSE mg/dL  --  171*   CALCIUM mg/dL  --  9.5   ALT (SGPT) U/L  --  43*   AST (SGOT) U/L  --  40   TROPONIN T ng/mL <0.010 <0.010    PROBNP pg/mL  --  14.7     Estimated Creatinine Clearance: 260.7 mL/min (A) (by C-G formula based on SCr of 0.69 mg/dL (L)).    Microbiology Results Abnormal     Procedure Component Value - Date/Time    Respiratory Panel PCR w/COVID-19(SARS-CoV-2) HUSSEIN/BECKY/RISA/PAD/COR/MAD/ADDY In-House, NP Swab in UTM/VTM, 3-4 HR TAT - Swab, Nasopharynx [[id number]]  (Normal) Collected: 11/22/20 1225    Lab Status: Final result Specimen: Swab from Nasopharynx Updated: 11/22/20 1333     ADENOVIRUS, PCR Not Detected     Coronavirus 229E Not Detected     Coronavirus HKU1 Not Detected     Coronavirus NL63 Not Detected     Coronavirus OC43 Not Detected     COVID19 Not Detected     Human Metapneumovirus Not Detected     Human Rhinovirus/Enterovirus Not Detected     Influenza A PCR Not Detected     Influenza A H1 Not Detected     Influenza A H1 2009 PCR Not Detected     Influenza A H3 Not Detected     Influenza B PCR Not Detected     Parainfluenza Virus 1 Not Detected     Parainfluenza Virus 2 Not Detected     Parainfluenza Virus 3 Not Detected     Parainfluenza Virus 4 Not Detected     RSV, PCR Not Detected     Bordetella pertussis pcr Not Detected     Bordetella parapertussis PCR Not Detected     Chlamydophila pneumoniae PCR Not Detected     Mycoplasma pneumo by PCR Not Detected    Narrative:      Fact sheet for providers: https://docs.[employer]/wp-content/uploads/GVO7583-0893-HD5.1-EUA-Provider-Fact-Sheet-3.pdf    Fact sheet for patients: https://docs.[employer]/wp-content/uploads/WUI9889-6505-HR4.1-EUA-Patient-Fact-Sheet-1.pdf          Imaging Results (Last 24 Hours)     Procedure Component Value Units Date/Time    MRI Brain Without Contrast [[id number]] Collected: 11/22/20 1633     Updated: 11/23/20 0917    Narrative:      EXAMINATION: MRI BRAIN WO CONTRAST - 11/22/2020     INDICATION: Dizziness. Difficulty speaking.     TECHNIQUE: Sagittal and axial T1 and axial T2 FLAIR and  diffusion-weighted images of the brain, axial T2  Flash images.     COMPARISON: Unenhanced head CT scan of same date.     FINDINGS: History indicates transient episode of dizziness, difficulty  speaking, confusion this morning.     No evidence of restricted diffusion is seen to suggest acute infarction.  The diffusion series does show a subtle asymmetry in appearance of the  cortex of the posterior temporal lobes, which has a little higher T2  signal on the left than on the right, on all three diffusion-weighted  series. It is uncertain if this is a type of chemical shift artifact or  could represent trace amount of edema. Subjectively, left temporal  cortex appears a little higher in signal on corresponding FLAIR images,  specifically #11, #12, #13 and #14 of series 10 than the contralateral  side.     No well-defined areas of edema are identified. There is no evidence of  mass, mass effect, signal abnormality suggestive of hemorrhage, evidence  of hydrocephalus, or abnormal extra-axial collection. Sagittal images  show the midline structures to appear within normal limits, the  pituitary gland at upper limits of normal AP diameter at 10 mm, but not  extending above the sella turcica. Anatomy of the craniocervical  junction appears normal. There is expected flow signal in the major  intracranial arteries and median sagittal sinus.       Impression:      1. No evidence of acute infarction or other clearly acute disease.     2. Very subtle appearance of increased T2 signal in the posterior left  temporal lobe cortex. This is a very mild asymmetry and could be  artifactual. If the patient's symptoms refer to this region, consider  follow-up exam, preferably including contrast enhanced series. EEG  correlation could also be helpful, depending on clinical history.     DICTATED:   11/22/2020  EDITED/ls :   11/22/2020      This report was finalized on 11/23/2020 9:13 AM by Dr. Holland Grigsby MD.       CT Angiogram Chest [[id number]] Collected: 11/22/20 1307     Updated:  11/23/20 0835    Narrative:      EXAMINATION: CT ANGIOGRAM CHEST - 11/22/2020     INDICATION: Near syncope, dizziness, left arm weakness, cough and Covid  exposure.     TECHNIQUE: Spiral acquisition 3 mm post IV contrast images through the  chest with sagittal and coronal 2 mm 2D reconstructions.     The radiation dose reduction device was turned on for each scan per the  ALARA (As Low as Reasonably Achievable) protocol.     COMPARISON: None.     FINDINGS: There is good contrast opacification of the pulmonary  arteries. No filling defects are seen to suggest pulmonary embolic  disease. There is no evidence of thoracic aortic aneurysm or dissection,  mediastinal or hilar adenopathy, or pericardial or pleural effusion.     Lung window images show mild image-related blurring throughout the scan.  There may be mild diffuse peribronchial thickening, but this is  difficult to distinguish from motion-related blurring. There is no  evidence of focal lung disease, in particular, no focal groundglass  opacity is seen to suggest Covid 19 pneumonia or other atypical  pneumonia.     Included images of the upper abdomen show extensive fatty liver change.  No significant abnormalities are seen included portions of the  gallbladder, pancreas, adrenal glands, or upper renal poles. The spleen  is incompletely included on this exam but appears to be mildly enlarged.  Bony structures appear grossly intact.       Impression:      1. No evidence of pulmonary embolus.     2. Motion degraded exam with what appears to be mild diffuse  peribronchial thickening, as from viral syndrome, although this may in  part be due to image blurring. No evidence of focal pulmonary disease  and, in particular, no findings typical of Covid 19 pneumonia.     3. Marked fatty liver change and probably mild splenomegaly.     DICTATED:   11/22/2020  EDITED/ls :   11/22/2020         This report was finalized on 11/23/2020 8:32 AM by Dr. Holland Grigsby MD.       CT  Head Without Contrast [[id number]] Collected: 11/22/20 1300     Updated: 11/22/20 2335    Narrative:      EXAMINATION: CT HEAD WO CONTRAST - 11/22/2020     INDICATION: Near syncope, dizziness, left arm weakness and cough. Covid  exposure.     TECHNIQUE: 5 mm unenhanced images through the brain.     The radiation dose reduction device was turned on for each scan per the  ALARA (As Low as Reasonably Achievable) protocol.     COMPARISON: None.     FINDINGS: History indicates syncope, left arm weakness, dizziness, and  cough.     The calvarium appears intact. Left maxillary sinus is mostly opacified  although there may be a left maxillary sinus window present. There is  left ethmoid sinus opacification. Remaining paranasal sinuses and  mastoids appear clear. Soft tissue window images show the brain to  appear within normal limits. There is no evidence of acute or old  infarct, no evidence of edema, hemorrhage, contusion, hydrocephalus,  mass or mass effect, or abnormal extra-axial collection. No unusual  focal or generalized brain atrophy is seen.       Impression:      No evidence of acute intracranial disease is seen.  Incidentally noted left maxillary and left ethmoid sinus disease.     DICTATED:   11/22/2020  EDITED/ls :   11/22/2020     This report was finalized on 11/22/2020 11:32 PM by Dr. Holland Grigsby MD.       CT Angiogram Neck [[id number]] Collected: 11/22/20 1943     Updated: 11/22/20 1945    Narrative:      See dictation CT angiogram intracranial. CT angiogram of the head and neck are dictated together.    Signer Name: Xenia Laura MD   Signed: 11/22/2020 7:43 PM   Workstation Name: [email or web address]    [facility]    CT Angiogram Head [[id number]] Collected: 11/22/20 1943     Updated: 11/22/20 1945    Narrative:      CTA Head    INDICATION:   Dizziness, nonspecific. TIA aphasia onset at 9:30 this morning. Symptoms now resolved.    TECHNIQUE:   CT angiogram of the head and neck during intravenous  contrast administration. Dose recorded in the patient's chart.. 3-D reconstructions were obtained and reviewed.  Evaluation for a significant carotid arterial stenosis is based on the NASCET criteria.  Radiation dose reduction techniques included automated exposure control or exposure modulation based on body size. Count of known CT and cardiac nuc med studies performed in previous 12 months: 2. Noncontrast images were also obtained.     COMPARISON:   Is an earlier noncontrast head CT and an MRI of the brain same day.    FINDINGS:   CTA neck:  Study is limited by the patient's morbid obesity. The aortic arch branch pattern is normal. By NASCET criteria, there is 0% stenosis at either carotid bifurcation. Both vertebral arteries are patent though they are not well seen proximally due  to the shoulder girdle. They are codominant. Both supply the basilar.    CTA head:  There is no intracranial vascular cut off. There is no focal central stenosis. The dural venous sinuses are grossly patent.  There is an anterior communicating artery present. No significant sized posterior communicator is seen on either side. There is no gross aneurysm appreciated on this examination performed with rapid technology for stroke evaluation.    There our degenerative changes in the cervical spine.    There is chronic sinusitis and osteitis involving the left maxillary sinus with likely prior paranasal sinus surgery. There is also partial opacification of left-sided ethmoid air cells. There is deformity of the left midline frontal sinus with erosion  through the left frontal bone cortex. The bone defect is about 1.2 cm in length associated with a rounded 1.2 cm in diameter soft tissue lesion. Please correlate with known sinus surgery history. This could be a mucocele that has disrupted the cortex.  More aggressive disease is felt less likely but further evaluation of the paranasal sinuses is recommended. If there is outside imaging,  comparison would be most helpful.      Impression:      #1 there is significant limitation of this examination by the patient's morbid obesity.  2. By NASCET criteria there is a 0% stenosis at either carotid bifurcation and both vertebral arteries are patent and codominant.  3. No intracranial vascular cut off or focal central stenosis is suspected.  4. Findings of chronic sinusitis and evidence of prior paranasal sinus surgery. Additionally there is a rounded soft tissue density with bone disruption at the left frontal sinus medially. Please correlate with the known sinus surgery history and outside  imaging to determine the significance of the finding.    Signer Name: Xenia Laura MD   Signed: 11/22/2020 7:43 PM   [email or web address]    [facility]               I have reviewed the medications:  Scheduled Meds:aspirin, 325 mg, Oral, Daily    Or  aspirin, 300 mg, Rectal, Daily  atorvastatin, 80 mg, Oral, Nightly  insulin lispro, 0-9 Units, Subcutaneous, TID AC  sodium chloride, 10 mL, Intravenous, Q12H      Continuous Infusions:   PRN Meds:.•  acetaminophen **OR** acetaminophen  •  dextrose  •  dextrose  •  glucagon (human recombinant)  •  sodium chloride  •  [COMPLETED] Insert peripheral IV **AND** sodium chloride  •  sodium chloride    Assessment/Plan   Assessment & Plan     Active Hospital Problems    Diagnosis  POA   • **TIA (transient ischemic attack) [G45.9]  Yes   • Type 2 diabetes mellitus (CMS/HCC) [E11.9]  Yes   • Hyperlipidemia [E78.5]  Yes      Resolved Hospital Problems   No resolved problems to display.        Brief Hospital Course to date:  Dom Russell is a 40 y.o. male with past medical history significant for diabetes mellitus, hyperlipidemia, hypertension who presents to [facility]/22/2020 secondary to dizziness.      Rule out CVA  Lightheadedness  Word finding difficulty  -CT head negative  -MRI brain: Very subtle appearance of increased T2  signal in the posterior left temporal lobe complex.  Mild asymmetry, could be artifact. MRI brain with contrast ordered and pending per neurology recommendations  -Neurology following  - ASA/statin  - EEG pending  - tobacco cessation and lifestyle modification needed     Hypertension  -Metoprolol  mg every 24 hours  -Tribenzor 40-5-25mg at home, continue equivalents here     Diabetes mellitus  -The patient is taking Metformin 2000 mg daily, Jardiance 25 mg daily and Januvia 100 mg daily  - A1c 10  - diabetic educator consulted  - we discussed importance of weight loss and need for diet change and exercise     Hyperlipidemia  Hypertriglyceridemia  - change to Crestor 40mg  - note LDL unable to be calculated  - HDL 19, TG 1145  - add tricor at discharge     Tobacco abuse  -Counseled patient on tobacco cessation.  He says he plans to quit. States the stress of his job has caused him to start again. Denies need for nicotine patch     Intermittent chest burning  -Negative for pulmonary embolism  -No evidence of pneumonia on CT  -recent COVID exposure with negative test here, recommend testing again mid-week as this will be 7 days from his exposure.     Fatty liver and splenomegaly   -needs outpatient follow up and aggressive lifestyle modificaiton     DVT prophylaxis:  SCD     Disposition: I expect the patient to be discharged today if testing is negative vs tomorrow.    CODE STATUS:   Code Status and Medical Interventions:   Ordered at: 11/22/20 0086     Level Of Support Discussed With:    Patient     Code Status:    CPR     Medical Interventions (Level of Support Prior to Arrest):    Full       Ida Chakraborty, DO  11/23/20

## 2020-11-24 VITALS
SYSTOLIC BLOOD PRESSURE: 150 MMHG | OXYGEN SATURATION: 94 % | BODY MASS INDEX: 36.45 KG/M2 | WEIGHT: 315 LBS | TEMPERATURE: 97.9 F | HEART RATE: 65 BPM | DIASTOLIC BLOOD PRESSURE: 99 MMHG | HEIGHT: 78 IN | RESPIRATION RATE: 16 BRPM

## 2020-11-24 PROBLEM — R42 ORTHOSTATIC DIZZINESS: Status: ACTIVE | Noted: 2020-11-24

## 2020-11-24 PROBLEM — G45.9 TIA (TRANSIENT ISCHEMIC ATTACK): Status: RESOLVED | Noted: 2020-11-22 | Resolved: 2020-11-24

## 2020-11-24 LAB
GLUCOSE BLDC GLUCOMTR-MCNC: 193 MG/DL (ref 70–130)
GLUCOSE BLDC GLUCOMTR-MCNC: 239 MG/DL (ref 70–130)
SARS-COV-2 RNA RESP QL NAA+PROBE: NOT DETECTED

## 2020-11-24 PROCEDURE — 63710000001 INSULIN LISPRO (HUMAN) PER 5 UNITS: Performed by: PHYSICIAN ASSISTANT

## 2020-11-24 PROCEDURE — 99214 OFFICE O/P EST MOD 30 MIN: CPT | Performed by: STUDENT IN AN ORGANIZED HEALTH CARE EDUCATION/TRAINING PROGRAM

## 2020-11-24 PROCEDURE — 82962 GLUCOSE BLOOD TEST: CPT

## 2020-11-24 PROCEDURE — 99217 PR OBSERVATION CARE DISCHARGE MANAGEMENT: CPT | Performed by: PHYSICIAN ASSISTANT

## 2020-11-24 PROCEDURE — 94799 UNLISTED PULMONARY SVC/PX: CPT

## 2020-11-24 PROCEDURE — G0378 HOSPITAL OBSERVATION PER HR: HCPCS

## 2020-11-24 PROCEDURE — G0108 DIAB MANAGE TRN  PER INDIV: HCPCS

## 2020-11-24 PROCEDURE — 87635 SARS-COV-2 COVID-19 AMP PRB: CPT | Performed by: PHYSICIAN ASSISTANT

## 2020-11-24 PROCEDURE — 94660 CPAP INITIATION&MGMT: CPT

## 2020-11-24 RX ORDER — ROSUVASTATIN CALCIUM 40 MG/1
40 TABLET, COATED ORAL NIGHTLY
Qty: 30 TABLET | Refills: 2 | Status: SHIPPED | OUTPATIENT
Start: 2020-11-24 | End: 2022-10-26 | Stop reason: SDUPTHER

## 2020-11-24 RX ORDER — ACETAMINOPHEN 325 MG/1
650 TABLET ORAL EVERY 4 HOURS PRN
Start: 2020-11-24 | End: 2022-10-06

## 2020-11-24 RX ADMIN — SODIUM CHLORIDE, PRESERVATIVE FREE 10 ML: 5 INJECTION INTRAVENOUS at 08:44

## 2020-11-24 RX ADMIN — INSULIN LISPRO 2 UNITS: 100 INJECTION, SOLUTION INTRAVENOUS; SUBCUTANEOUS at 08:44

## 2020-11-24 RX ADMIN — ASPIRIN 325 MG ORAL TABLET 325 MG: 325 PILL ORAL at 08:43

## 2020-11-24 RX ADMIN — INSULIN LISPRO 4 UNITS: 100 INJECTION, SOLUTION INTRAVENOUS; SUBCUTANEOUS at 12:37

## 2020-11-24 RX ADMIN — PANTOPRAZOLE SODIUM 40 MG: 40 TABLET, DELAYED RELEASE ORAL at 08:43

## 2020-11-24 NOTE — DISCHARGE INSTR - DIET
Continue low fat, heart healthy, consistent carbohydrate diet.  Check your blood sugar before each meal and at bedtime.

## 2020-11-24 NOTE — DISCHARGE SUMMARY
[facility] Medicine Services  DISCHARGE SUMMARY    Patient Name: Dom Russell  : 1980  MRN: [id number]    Date of Admission: 2020 11:41 AM  Date of Discharge:  2020  Primary Care Physician: Rigoberto Brand MD    Consults     Date and Time Order Name Status Description    2020 0032 Inpatient Neurology Consult Stroke Completed           Hospital Course     Presenting Problem:   TIA (transient ischemic attack) [G45.9]  TIA (transient ischemic attack) [G45.9]    Active Hospital Problems    Diagnosis  POA   • Orthostatic dizziness [R42]  Yes   • Type 2 diabetes mellitus (CMS/HCC) [E11.9]  Yes   • Hyperlipidemia [E78.5]  Yes      Resolved Hospital Problems    Diagnosis Date Resolved POA   • **TIA (transient ischemic attack) [G45.9] 2020 Yes          Hospital Course:  Dom Russell is a 40 y.o. male with past medical history significant for hyperlipidemia, hypertriglyceridemia, hypertension, diabetes mellitus, fatty liver and tobacco abuse who was admitted to [facility] on 2020 secondary to headedness.  The patient reported on admission that he had lightheadedness after getting out of bed and felt more severe lightheadedness with standing.  He subsequently had difficulty with word finding and this concerned him to go to the ER.  Incidentally he had COVID-19 exposure 1 week prior to admission.  His admission COVID-19 test was negative.  On admission CT head negative.  MRI brain: Very subtle appearance of increased T2 signal in the posterior left temporal lobe complex.  Mild asymmetry, could be artifact.  The patient had a EEG which was normal.  He also had a contrasted MRI of the brain which was normal.  Neurology was consulted and evaluated the patient.  They recommended aspirin and receptor modification.  They felt his symptoms were more related to orthostatic hypotension and not TIA.  His hemoglobin A1c on admission was 10.  He was  educated on weight loss and diet changes as well as exercise.  His cholesterol and triglycerides were checked.  His LDL was so high it was unable to be calculated.  He was changed to high intensity statin Crestor 40 mg.  He was advised to continue fenofibrate on discharge.  He was counseled on tobacco cessation and he is ready to quit.      Discharge Follow Up Recommendations for outpatient labs/diagnostics:  Follow-up with primary care doctor in 1 week.    Day of Discharge     HPI:   Mr. Russell reports he feels fine today.  He is eager for discharge.  He is requesting repeat COVID-19 test secondary to burning chest sensation.    Review of Systems  Gen- No fevers, chills  CV- No chest pain, palpitations  Resp- No cough, dyspnea  GI- No N/V/D, abd pain    Vital Signs:   Temp:  [97.9 °F (36.6 °C)-98.2 °F (36.8 °C)] 97.9 °F (36.6 °C)  Heart Rate:  [54-77] 65  Resp:  [16-18] 16  BP: (135-167)/(74-99) 150/99     Physical Exam:  Constitutional: No acute distress, awake, alert  HENT: NCAT, mucous membranes moist  Respiratory: Clear to auscultation bilaterally, respiratory effort normal   Cardiovascular: RRR, no murmurs, rubs, or gallops  Gastrointestinal: Positive bowel sounds, soft, nontender, nondistended  Musculoskeletal: No bilateral ankle edema  Psychiatric: Appropriate affect, cooperative  Neurologic: Oriented x 3, strength symmetric in all extremities, Cranial Nerves grossly intact to confrontation, speech clear  Skin: No rashes      Pertinent  and/or Most Recent Results     Results from last 7 days   Lab Units 11/22/20  1149 11/22/20  1148   WBC 10*3/mm3  --  8.17   HEMOGLOBIN g/dL  --  16.7   HEMATOCRIT %  --  50.2   PLATELETS 10*3/mm3  --  190   SODIUM mmol/L 137  --    POTASSIUM mmol/L 4.3  --    CHLORIDE mmol/L 99  --    CO2 mmol/L 24.0  --    BUN mg/dL 23*  --    CREATININE mg/dL 0.69*  --    GLUCOSE mg/dL 171*  --    CALCIUM mg/dL 9.5  --      Results from last 7 days   Lab Units 11/22/20  1149   BILIRUBIN  mg/dL 0.4   ALK PHOS U/L 66   ALT (SGPT) U/L 43*   AST (SGOT) U/L 40   PROTIME Seconds 13.6   INR  1.07     Results from last 7 days   Lab Units 11/23/20  0554   CHOLESTEROL mg/dL 191   TRIGLYCERIDES mg/dL 1,145*   HDL CHOL mg/dL 19*     Results from last 7 days   Lab Units 11/23/20  0554 11/22/20  1357 11/22/20  1149   HEMOGLOBIN A1C % 10.00*  --   --    PROBNP pg/mL  --   --  14.7   TROPONIN T ng/mL  --  <0.010 <0.010       Brief Urine Lab Results  (Last result in the past 365 days)      Color   Clarity   Blood   Leuk Est   Nitrite   Protein   CREAT   Urine HCG        11/22/20 1234 Yellow Clear Negative Negative Negative Negative               Microbiology Results Abnormal     Procedure Component Value - Date/Time    Respiratory Panel PCR w/COVID-19(SARS-CoV-2) HUSSEIN/BECKY/RISA/PAD/COR/MAD/ADDY In-House, NP Swab in UTM/VTM, 3-4 HR TAT - Swab, Nasopharynx [[id number]]  (Normal) Collected: 11/22/20 1225    Lab Status: Final result Specimen: Swab from Nasopharynx Updated: 11/22/20 1333     ADENOVIRUS, PCR Not Detected     Coronavirus 229E Not Detected     Coronavirus HKU1 Not Detected     Coronavirus NL63 Not Detected     Coronavirus OC43 Not Detected     COVID19 Not Detected     Human Metapneumovirus Not Detected     Human Rhinovirus/Enterovirus Not Detected     Influenza A PCR Not Detected     Influenza A H1 Not Detected     Influenza A H1 2009 PCR Not Detected     Influenza A H3 Not Detected     Influenza B PCR Not Detected     Parainfluenza Virus 1 Not Detected     Parainfluenza Virus 2 Not Detected     Parainfluenza Virus 3 Not Detected     Parainfluenza Virus 4 Not Detected     RSV, PCR Not Detected     Bordetella pertussis pcr Not Detected     Bordetella parapertussis PCR Not Detected     Chlamydophila pneumoniae PCR Not Detected     Mycoplasma pneumo by PCR Not Detected    Narrative:      Fact sheet for providers: https://docs.[employer]/wp-content/uploads/PLW8016-2387-JI2.1-EUA-Provider-Fact-Sheet-3.pdf    Fact  sheet for patients: https://docs.[employer]/wp-content/uploads/UKT9604-7590-TW0.1-EUA-Patient-Fact-Sheet-1.pdf          Imaging Results (All)     Procedure Component Value Units Date/Time    MRI Brain With Contrast [[id number]] Collected: 11/24/20 0928     Updated: 11/24/20 1018    Narrative:      EXAMINATION: MRI BRAIN W CONTRAST-      INDICATION: MRI without showed left temporal lobe flair signal.;  G45.9-Transient cerebral ischemic attack, unspecified; J06.9-Acute upper  respiratory infection, unspecified; Z86.39-Personal history of other  endocrine, nutritional and metabolic disease; Z87.891-Personal history  of nicotine dependence.     TECHNIQUE: Initial axial unenhanced T1 FLASH images, post contrast  axial, sagittal and coronal T1-weighted images.     COMPARISON: Unenhanced brain MRI from 11/22/2020.     FINDINGS: By report, previous MRI study showed asymmetric, mildly  increased FLAIR signal along the left temporal lobe favored to be  artifact, contrast enhanced MRI suggested for further evaluation.     Today's unenhanced MRI study appears unremarkable with no apparent mass,  mass effect, no evidence of hemorrhage or hydrocephalus and at least no  obvious edema. No abnormal extraaxial collection is seen. Postcontrast  images show no evidence of cortical enhancement in the area noted on the  prior study, and no evidence of pathologic brain or meningeal  enhancement elsewhere.       Impression:      Negative contrast-enhanced brain MRI.     D:  11/24/2020  E:  11/24/2020          MRI Brain Without Contrast [[id number]] Collected: 11/22/20 1633     Updated: 11/23/20 0917    Narrative:      EXAMINATION: MRI BRAIN WO CONTRAST - 11/22/2020     INDICATION: Dizziness. Difficulty speaking.     TECHNIQUE: Sagittal and axial T1 and axial T2 FLAIR and  diffusion-weighted images of the brain, axial T2 Flash images.     COMPARISON: Unenhanced head CT scan of same date.     FINDINGS: History indicates transient episode of  dizziness, difficulty  speaking, confusion this morning.     No evidence of restricted diffusion is seen to suggest acute infarction.  The diffusion series does show a subtle asymmetry in appearance of the  cortex of the posterior temporal lobes, which has a little higher T2  signal on the left than on the right, on all three diffusion-weighted  series. It is uncertain if this is a type of chemical shift artifact or  could represent trace amount of edema. Subjectively, left temporal  cortex appears a little higher in signal on corresponding FLAIR images,  specifically #11, #12, #13 and #14 of series 10 than the contralateral  side.     No well-defined areas of edema are identified. There is no evidence of  mass, mass effect, signal abnormality suggestive of hemorrhage, evidence  of hydrocephalus, or abnormal extra-axial collection. Sagittal images  show the midline structures to appear within normal limits, the  pituitary gland at upper limits of normal AP diameter at 10 mm, but not  extending above the sella turcica. Anatomy of the craniocervical  junction appears normal. There is expected flow signal in the major  intracranial arteries and median sagittal sinus.       Impression:      1. No evidence of acute infarction or other clearly acute disease.     2. Very subtle appearance of increased T2 signal in the posterior left  temporal lobe cortex. This is a very mild asymmetry and could be  artifactual. If the patient's symptoms refer to this region, consider  follow-up exam, preferably including contrast enhanced series. EEG  correlation could also be helpful, depending on clinical history.     DICTATED:   11/22/2020  EDITED/ls :   11/22/2020      This report was finalized on 11/23/2020 9:13 AM by Dr. Holland Grigsby MD.       CT Angiogram Chest [[id number]] Collected: 11/22/20 1304     Updated: 11/23/20 0835    Narrative:      EXAMINATION: CT ANGIOGRAM CHEST - 11/22/2020     INDICATION: Near syncope, dizziness, left  arm weakness, cough and Covid  exposure.     TECHNIQUE: Spiral acquisition 3 mm post IV contrast images through the  chest with sagittal and coronal 2 mm 2D reconstructions.     The radiation dose reduction device was turned on for each scan per the  ALARA (As Low as Reasonably Achievable) protocol.     COMPARISON: None.     FINDINGS: There is good contrast opacification of the pulmonary  arteries. No filling defects are seen to suggest pulmonary embolic  disease. There is no evidence of thoracic aortic aneurysm or dissection,  mediastinal or hilar adenopathy, or pericardial or pleural effusion.     Lung window images show mild image-related blurring throughout the scan.  There may be mild diffuse peribronchial thickening, but this is  difficult to distinguish from motion-related blurring. There is no  evidence of focal lung disease, in particular, no focal groundglass  opacity is seen to suggest Covid 19 pneumonia or other atypical  pneumonia.     Included images of the upper abdomen show extensive fatty liver change.  No significant abnormalities are seen included portions of the  gallbladder, pancreas, adrenal glands, or upper renal poles. The spleen  is incompletely included on this exam but appears to be mildly enlarged.  Bony structures appear grossly intact.       Impression:      1. No evidence of pulmonary embolus.     2. Motion degraded exam with what appears to be mild diffuse  peribronchial thickening, as from viral syndrome, although this may in  part be due to image blurring. No evidence of focal pulmonary disease  and, in particular, no findings typical of Covid 19 pneumonia.     3. Marked fatty liver change and probably mild splenomegaly.     DICTATED:   11/22/2020  EDITED/ls :   11/22/2020         This report was finalized on 11/23/2020 8:32 AM by Dr. Holland Grigsby MD.       CT Head Without Contrast [[id number]] Collected: 11/22/20 1300     Updated: 11/22/20 2335    Narrative:      EXAMINATION: CT HEAD  WO CONTRAST - 11/22/2020     INDICATION: Near syncope, dizziness, left arm weakness and cough. Covid  exposure.     TECHNIQUE: 5 mm unenhanced images through the brain.     The radiation dose reduction device was turned on for each scan per the  ALARA (As Low as Reasonably Achievable) protocol.     COMPARISON: None.     FINDINGS: History indicates syncope, left arm weakness, dizziness, and  cough.     The calvarium appears intact. Left maxillary sinus is mostly opacified  although there may be a left maxillary sinus window present. There is  left ethmoid sinus opacification. Remaining paranasal sinuses and  mastoids appear clear. Soft tissue window images show the brain to  appear within normal limits. There is no evidence of acute or old  infarct, no evidence of edema, hemorrhage, contusion, hydrocephalus,  mass or mass effect, or abnormal extra-axial collection. No unusual  focal or generalized brain atrophy is seen.       Impression:      No evidence of acute intracranial disease is seen.  Incidentally noted left maxillary and left ethmoid sinus disease.     DICTATED:   11/22/2020  EDITED/ls :   11/22/2020     This report was finalized on 11/22/2020 11:32 PM by Dr. Holland Grigsby MD.       CT Angiogram Neck [[id number]] Collected: 11/22/20 1943     Updated: 11/22/20 1945    Narrative:      See dictation CT angiogram intracranial. CT angiogram of the head and neck are dictated together.    Signer Name: Xenia Laura MD   Signed: 11/22/2020 7:43 PM   Workstation [facility]: [email or web address]    [facility]    CT Angiogram Head [[id number]] Collected: 11/22/20 1943     Updated: 11/22/20 1945    Narrative:      CTA Head    INDICATION:   Dizziness, nonspecific. TIA aphasia onset at 9:30 this morning. Symptoms now resolved.    TECHNIQUE:   CT angiogram of the head and neck during intravenous contrast administration. Dose recorded in the patient's chart.. 3-D reconstructions were obtained and reviewed.  Evaluation  for a significant carotid arterial stenosis is based on the NASCET criteria.  Radiation dose reduction techniques included automated exposure control or exposure modulation based on body size. Count of known CT and cardiac nuc med studies performed in previous 12 months: 2. Noncontrast images were also obtained.     COMPARISON:   Is an earlier noncontrast head CT and an MRI of the brain same day.    FINDINGS:   CTA neck:  Study is limited by the patient's morbid obesity. The aortic arch branch pattern is normal. By NASCET criteria, there is 0% stenosis at either carotid bifurcation. Both vertebral arteries are patent though they are not well seen proximally due  to the shoulder girdle. They are codominant. Both supply the basilar.    CTA head:  There is no intracranial vascular cut off. There is no focal central stenosis. The dural venous sinuses are grossly patent.  There is an anterior communicating artery present. No significant sized posterior communicator is seen on either side. There is no gross aneurysm appreciated on this examination performed with rapid technology for stroke evaluation.    There our degenerative changes in the cervical spine.    There is chronic sinusitis and osteitis involving the left maxillary sinus with likely prior paranasal sinus surgery. There is also partial opacification of left-sided ethmoid air cells. There is deformity of the left midline frontal sinus with erosion  through the left frontal bone cortex. The bone defect is about 1.2 cm in length associated with a rounded 1.2 cm in diameter soft tissue lesion. Please correlate with known sinus surgery history. This could be a mucocele that has disrupted the cortex.  More aggressive disease is felt less likely but further evaluation of the paranasal sinuses is recommended. If there is outside imaging, comparison would be most helpful.      Impression:      #1 there is significant limitation of this examination by the patient's  morbid obesity.  2. By NASCET criteria there is a 0% stenosis at either carotid bifurcation and both vertebral arteries are patent and codominant.  3. No intracranial vascular cut off or focal central stenosis is suspected.  4. Findings of chronic sinusitis and evidence of prior paranasal sinus surgery. Additionally there is a rounded soft tissue density with bone disruption at the left frontal sinus medially. Please correlate with the known sinus surgery history and outside  imaging to determine the significance of the finding.    Signer Name: Xenia Laura MD   Signed: 11/22/2020 7:43 PM   Workstation Name: [email or web address]    [facility]                    Results for orders placed during the hospital encounter of 11/22/20   Adult Transthoracic Echo Complete W/ Cont if Necessary Per Protocol    Narrative · Left ventricular systolic function is normal. Estimated left ventricular   EF = 60%  · The cardiac valves are anatomically and functionally normal.          Plan for Follow-up of Pending Labs/Results:   Pending Labs     Order Current Status    COVID PRE-OP / PRE-PROCEDURE SCREENING ORDER (NO ISOLATION) - Swab, Nasopharynx In process    COVID-19,CEPHEID,BECKY IN-HOUSE(OR EMERGENT/ADD-ON),NP SWAB IN TRANSPORT MEDIA 3-4 HR TAT - Swab, Nasopharynx In process        Discharge Details        Discharge Medications      New Medications      Instructions Start Date   acetaminophen 325 MG tablet  Commonly known as: TYLENOL   650 mg, Oral, Every 4 Hours PRN         Changes to Medications      Instructions Start Date   rosuvastatin 40 MG tablet  Commonly known as: CRESTOR  What changed:   · medication strength  · how much to take  · when to take this   40 mg, Oral, Nightly         Continue These Medications      Instructions Start Date   aspirin 81 MG chewable tablet   81 mg, Oral, Daily      fenofibrate micronized 134 MG capsule  Commonly known as: LOFIBRA   134 mg, Oral, Every Morning Before Breakfast       Jardiance 25 MG tablet  Generic drug: Empagliflozin   1 tablet, Oral, Daily      lansoprazole 30 MG capsule  Commonly known as: PREVACID   30 mg, Oral, Daily      metFORMIN 1000 MG tablet  Commonly known as: GLUCOPHAGE   2,000 mg, Oral, Daily With Breakfast      metoprolol succinate  MG 24 hr tablet  Commonly known as: TOPROL-XL   100 mg, Oral, Daily      SITagliptin 100 MG tablet  Commonly known as: JANUVIA   100 mg, Oral, Daily      Tribenzor 40-5-25 MG tablet  Generic drug: Olmesartan-amLODIPine-HCTZ   1 tablet, Oral, Daily             Allergies   Allergen Reactions   • Clarithromycin Hives   • Codeine Hives   • Fortaz [Ceftazidime] Hives   • Penicillins Hives   • Unasyn [Ampicillin-Sulbactam Sodium] Hives         Discharge Disposition:  Home or Self Care    Diet:  Hospital:  Diet Order   Procedures   • Diet Regular; Consistent Carbohydrate       Activity:  as tolerated    Restrictions or Other Recommendations:  none       CODE STATUS:    Code Status and Medical Interventions:   Ordered at: 11/22/20 1834     Level Of Support Discussed With:    Patient     Code Status:    CPR     Medical Interventions (Level of Support Prior to Arrest):    Full       Future Appointments   Date Time Provider Department Center   12/2/2020 11:00 AM [facility]       Additional Instructions for the Follow-ups that You Need to Schedule     Discharge Follow-up with PCP   As directed       Currently Documented PCP:    Rigoberto Brand MD    PCP Phone Number:    [phone]     Follow Up Details: Follow up with PCP                     Moira Ledezma PA-C  11/24/20      Time Spent on Discharge:  I spent  45  minutes on this discharge activity which included: face-to-face encounter with the patient, reviewing the data in the system, coordination of the care with the nursing staff as well as consultants, documentation, and entering orders.

## 2020-11-24 NOTE — CONSULTS
"Diabetes Education  Assessment/Teaching    Patient Name:  Dom Russell  YOB: 1980  MRN: [id number]  Admit Date:  11/22/2020      Assessment Date:  11/24/2020    Most Recent Value   General Information    Referral From:  A1c   Height  198.1 cm (77.99\")   Height Method  Stated   Weight  (!) 181 kg (398 lb)   Weight Method  Bed scale   Pregnancy Assessment   Diabetes History   What type of diabetes do you have?  Type 2   Length of Diabetes Diagnosis  1 - 5 years   Current DM knowledge  fair   Have you had diabetes education/teaching in the past?  yes   When and where was your diabetes education?  per PCP office   Do you test your blood sugar at home?  no   How often do you check your feet?  never   Education Preferences   What areas of diabetes would you like to learn about?  avoiding high blood sugar, avoiding low blood sugar, diabetes complications, diet information, exercise information, understanding diabetes   Nutrition Information   Assessment Topics   Healthy Eating - Assessment  Needs education   Being Active - Assessment  Needs education   Taking Medication - Assessment  Needs education   Problem Solving - Assessment  Needs education   Reducing Risk - Assessment  Needs education   Healthy Coping - Assessment  Needs education   Monitoring - Assessment  Needs education   DM Goals            Most Recent Value   DM Education Needs   Meter  Meter provided   Meter Type  One Touch   Blood Glucose Target Range  educated on blood glucose ranges per ADA recomendations   Medication  Oral   Problem Solving  Hypoglycemia, Hyperglycemia, Sick days, Signs, Symptoms, Treatment   Reducing Risks  A1C testing   Physical Activity  Walking   Physical Activity Frequency  Rarely   Discharge Plan  Home   Motivation  Moderate   Teaching Method  Explanation, Discussion, Handouts, Teach back   Patient Response  Verbalized understanding, Needs reinforcement            Other Comments:  Patient consents to diabetes " "education     Discussed and taught patient about type 2 diabetes self-management, risk factors, and importance of blood glucose control to reduce complications. Target blood glucose readings and A1c goals per ADA were reviewed. Reviewed with patient current A1c 10 and discussed its significance. Signs, symptoms, and treatment of hyperglycemia and hypoglycemia were discussed. Lifestyle changes such as physical activity with MD approval and healthy eating were encouraged. Encouraged 150m a week of activity and to begin with 10 m intervals and build up to 20m every dya of the week or 30 m 5 days a week, pending MD approval. Discussed the \"plate method\" as a way to build a health diet, patient shows understanding through teach back. Stressed the importance of strict blood sugar control after surgery to prevent complications such as infection and to promote healing of incision. Encouraged pt to monitor blood sugar at home 2-3  times per day and to call PCP if blood sugar is trending baltazar. Encouraged to keep record of blood glucose readings to take to follow up appointment with PCP. Provided patient with copy of [employer] \"What is Diabetes\" handout, \"Blood Glucose Goals\" handout, and \"What is A1c\" handout. RD consult placed for further nutrition education. Meter was donated to patient , denies need for meter teach at this time.    Thank you for this referral, please re consult as needed.        Patient has been scheduled for outpatient diabetes education follow up visit on 12/2 at 11 am via Zoom. Outpatient staff will provide reminder call prior to appointment. Patient was given reminder card with date and time of appointment and our contact information.         Electronically signed by:  Candido Harkins RN  11/24/20 12:22 EST  "

## 2020-11-24 NOTE — DISCHARGE INSTR - ACTIVITY
Activity as tolerated.  Maintain a healthy lifestyle including exercise and no smoking.     Spiritual Care Partner Volunteer visited patient in Neuro on October 31, 2019.     Documented by:     NAKITA Sethi, [facility], Staff [address]    [address] Paging Service  [phone])

## 2020-11-24 NOTE — PROGRESS NOTES
Stroke Progress Note       Chief Complaint: speech difficutly    Subjective    Subjective     Subjective:  No acute events overnight     Review of Systems   Constitutional: No fatigue  HENT: Negative for nosebleeds and rhinorrhea.    Eyes: Negative for redness.   Respiratory: Negative for cough.    Gastrointestinal: Negative for anal bleeding.   Endocrine: Negative for polydipsia.   Genitourinary: Negative for enuresis and urgency.   Musculoskeletal: Negative for joint swelling.   Neurological: Negative for tremors.   Psychiatric/Behavioral: Negative for hallucinations.   Objective      Temp:  [98 °F (36.7 °C)-98.2 °F (36.8 °C)] 98.1 °F (36.7 °C)  Heart Rate:  [54-77] 54  Resp:  [16-18] 16  BP: (135-167)/(74-97) 135/86    GEN: obese,  pleasant, cooperative  Eyes-show anicteric sclera, moist conjunctiva with no lid lag, no redness  Neck-trachea midline.  There is no thyromegaly.  ENMT-oropharynx clear with moist mucous membranes and good dentition.  Skin-no rash, lesions or ulcers.  Cardiovascular exam-no pedal edema, regular rate and rhythm.  CHEST: No signs of resp distress, on room air  Abdomen-no abdominal distention, nontender.  Psychiatric exam-alert oriented x3 with intact judgment and insight        NEURO     MENTAL STATUS: AAOx3, memory intact, fund of knowledge appropriate     LANG/SPEECH: Naming and repetition intact, fluent, follows 3-step commands     CRANIAL NERVES:       II: Pupils equal and reactive, no RAPD, no VF deficits, fundus(not done)       III, IV, VI: EOM intact, no gaze preference or deviation, no nystagmus.       V: normal sensation in V1, V2, and V3 segments bilaterally       VII: no asymmetry, no nasolabial fold flattening       VIII: normal hearing to speech       IX, X: normal palatal elevation, no uvular deviation       XI: 5/5 head turn and 5/5 shoulder shrug bilaterally       XII: midline tongue protrusion     MOTOR:  Normal tone throughout  5/5 muscle power in Rt shoulder  abductors/adductors, elbow flexors/extensors, wrist flexors/extensors, finger abductors/adductors.  5/5 in Rt hip flexors/extensors, knee flexors/extensors, ankle dorsiflexors and plantar flexors.     5/5 muscle power in Lt shoulder abductors/adductors, elbow flexors/extensors, wrist flexors/extensors, finger abductors/adductors.  5/5 in Lt hip flexors/extensors, knee flexors/extensors, ankle dorsiflexors and plantea flexors.     REFLEXES:  no Chaparro's, no clonus     SENSORY:     Normal to light touch all throughout      COORDINATION: Normal finger to nose and heel to shin, no tremor, no dysmetria     STATION: Not assessed due to patient condition     GAIT: Not assessed due to patient condition    Results Review:    I reviewed the patient's new clinical results.    Lab Results (last 24 hours)     Procedure Component Value Units Date/Time    POC Glucose Once [[id number]]  (Abnormal) Collected: 11/24/20 1134    Specimen: Blood Updated: 11/24/20 1149     Glucose 239 mg/dL     POC Glucose Once [[id number]]  (Abnormal) Collected: 11/24/20 0814    Specimen: Blood Updated: 11/24/20 0815     Glucose 193 mg/dL     POC Glucose Once [[id number]]  (Abnormal) Collected: 11/23/20 1645    Specimen: Blood Updated: 11/23/20 1647     Glucose 242 mg/dL     LDL Cholesterol, Direct [[id number]]  (Normal) Collected: 11/23/20 0554    Specimen: Blood Updated: 11/23/20 1520     LDL Cholesterol  41 mg/dL     Narrative:      LDL Reference Ranges    (U.S. Department of Health and Human Services ATP III Classifications)    Optimal          <100 mg/dl  Near Optimal     100-129 mg/dl  Borderline High  130-159 mg/dl  High             160-189 mg/dl  Very High        >189 mg/dl    LDL Reference Ranges    (U.S. Department of Health and Human Services ATP III Classifications)    Optimal          <100 mg/dl  Near Optimal     100-129 mg/dl  Borderline High  130-159 mg/dl  High             160-189 mg/dl  Very High        >189 mg/dl          Ct Angiogram  Head    Result Date: 11/22/2020  #1 there is significant limitation of this examination by the patient's morbid obesity. 2. By NASCET criteria there is a 0% stenosis at either carotid bifurcation and both vertebral arteries are patent and codominant. 3. No intracranial vascular cut off or focal central stenosis is suspected. 4. Findings of chronic sinusitis and evidence of prior paranasal sinus surgery. Additionally there is a rounded soft tissue density with bone disruption at the left frontal sinus medially. Please correlate with the known sinus surgery history and outside imaging to determine the significance of the finding. Signer Name: Xenia Laura MD  Signed: 11/22/2020 7:43 PM  Workstation Name: [facility]  [facility]    Ct Head Without Contrast    Result Date: 11/22/2020  No evidence of acute intracranial disease is seen. Incidentally noted left maxillary and left ethmoid sinus disease.  DICTATED:   11/22/2020 EDITED/ls :   11/22/2020  This report was finalized on 11/22/2020 11:32 PM by Dr. Holland Grigsby MD.      Mri Brain Without Contrast    Result Date: 11/23/2020  1. No evidence of acute infarction or other clearly acute disease.  2. Very subtle appearance of increased T2 signal in the posterior left temporal lobe cortex. This is a very mild asymmetry and could be artifactual. If the patient's symptoms refer to this region, consider follow-up exam, preferably including contrast enhanced series. EEG correlation could also be helpful, depending on clinical history.  DICTATED:   11/22/2020 EDITED/ls :   11/22/2020  This report was finalized on 11/23/2020 9:13 AM by Dr. Holland Grigsby MD.      Mri Brain With Contrast    Result Date: 11/24/2020  Negative contrast-enhanced brain MRI.  D:  11/24/2020 E:  11/24/2020       Ct Angiogram Chest    Result Date: 11/23/2020  1. No evidence of pulmonary embolus.  2. Motion degraded exam with what appears to be mild diffuse peribronchial thickening, as  from viral syndrome, although this may in part be due to image blurring. No evidence of focal pulmonary disease and, in particular, no findings typical of Covid 19 pneumonia.  3. Marked fatty liver change and probably mild splenomegaly.  DICTATED:   11/22/2020 EDITED/ls :   11/22/2020   This report was finalized on 11/23/2020 8:32 AM by Dr. Holland Grigsby MD.      Results for orders placed during the hospital encounter of 11/22/20   Adult Transthoracic Echo Complete W/ Cont if Necessary Per Protocol    Narrative · Left ventricular systolic function is normal. Estimated left ventricular   EF = 60%  · The cardiac valves are anatomically and functionally normal.                Assessment/Plan     Assessment/Plan:      Drew is a 40 RHWM with known medical diagnosis of hypertension, hyperlipidemia, diabetes type 2, obesity, tobacco abuse (1/2 pack/day), BERNARD on CPAP at night, and palpitations who was at his usual sate of health on 11/21/2020 @ 2230  presented to the emergency department  with complaints of lightheadedness associated with transient episode of expressive aphasia and confusion.         CTA head and Neck - no flow limiting stenosis. MRI brain evidence no acute infarct, but there is subtle T2 Flair hyperintensity signal in left temporal lobe, likely an artifact. His exam is normal today on my evaluation.    Repeat MRI Brain W contrast is normal   EEG for an episode of confusion, did not show any seizure tendencies or epileptiform discharges    Overall, his symptoms could be attributed to orthostatic hypotension vs autonomic dysfunction. Less likely this is  TIA/ Stroke.      1. Transient neurologic dysfunction      Plan  -continue aspirin 81, and recommend aggressive vascular risk factor control  - can be discharged from neurologic stand point.      2. DM2- A1c 10, need Diabetic NP education     3. Hypertension- Normalize BP      4. BERNARD-Continue CPAP     5. Morbid obesity- educated on weight loss     6. Tobacco  abuse- counseled on smoking cessation   .              Len Klein MD  11/24/20  11:55 EST

## 2020-12-02 ENCOUNTER — HOSPITAL ENCOUNTER (OUTPATIENT)
Dept: DIABETES SERVICES | Facility: HOSPITAL | Age: 40
Setting detail: RECURRING SERIES
Discharge: HOME | End: 2020-12-02

## 2020-12-02 NOTE — CONSULTS
"Mr. Russell attended follow up outpatient diabetes management/stoke education class via [facility]. Educated on basic diabetes pathophysiology and how related to increased risk for complications, specifically stroke. Discussed s/sx of hyperglycemia and hypoglycemia and self management of both. Education provided on TLC diet, the plate method, as well as exercise recommendations. Stressed importance of ongoing, lifelong follow up with PCP for diabetes and other chronic health condition management. Stressed importance of taking all medications as prescribed. Patient was given opportunity to ask questions, and was also encouraged to pursue further diabetes education class with both RN and RD. Class educational materials were mailed to patient's home: [facility]'s \"Diabetes Basics\" booklet, as well as our contact information for any further questions or needs.   "

## 2022-01-12 ENCOUNTER — HOSPITAL ENCOUNTER (OUTPATIENT)
Facility: HOSPITAL | Age: 42
Setting detail: OBSERVATION
LOS: 1 days | Discharge: HOME | End: 2022-01-13
Attending: EMERGENCY MEDICINE | Admitting: INTERNAL MEDICINE

## 2022-01-12 ENCOUNTER — APPOINTMENT (OUTPATIENT)
Dept: GENERAL RADIOLOGY | Facility: HOSPITAL | Age: 42
End: 2022-01-12

## 2022-01-12 ENCOUNTER — APPOINTMENT (OUTPATIENT)
Dept: CARDIOLOGY | Facility: HOSPITAL | Age: 42
End: 2022-01-12

## 2022-01-12 ENCOUNTER — APPOINTMENT (OUTPATIENT)
Dept: ULTRASOUND IMAGING | Facility: HOSPITAL | Age: 42
End: 2022-01-12

## 2022-01-12 DIAGNOSIS — R00.1 SINUS BRADYCARDIA: Primary | ICD-10-CM

## 2022-01-12 LAB
ALBUMIN SERPL-MCNC: 4.2 G/DL (ref 3.5–5.2)
ALBUMIN/GLOB SERPL: 2 G/DL
ALP SERPL-CCNC: 58 U/L (ref 39–117)
ALT SERPL W P-5'-P-CCNC: 24 U/L (ref 1–41)
ANION GAP SERPL CALCULATED.3IONS-SCNC: 12 MMOL/L (ref 5–15)
AST SERPL-CCNC: 20 U/L (ref 1–40)
BASOPHILS # BLD AUTO: 0.03 10*3/MM3 (ref 0–0.2)
BASOPHILS NFR BLD AUTO: 0.6 % (ref 0–1.5)
BH CV ECHO MEAS - AO MAX PG (FULL): 0.72 MMHG
BH CV ECHO MEAS - AO MAX PG: 4.3 MMHG
BH CV ECHO MEAS - AO MEAN PG (FULL): 0.38 MMHG
BH CV ECHO MEAS - AO MEAN PG: 2.2 MMHG
BH CV ECHO MEAS - AO ROOT AREA (BSA CORRECTED): 0.99
BH CV ECHO MEAS - AO ROOT AREA: 6.5 CM^2
BH CV ECHO MEAS - AO ROOT DIAM: 2.9 CM
BH CV ECHO MEAS - AO V2 MAX: 104.2 CM/SEC
BH CV ECHO MEAS - AO V2 MEAN: 70.1 CM/SEC
BH CV ECHO MEAS - AO V2 VTI: 26.9 CM
BH CV ECHO MEAS - ASC AORTA: 3.2 CM
BH CV ECHO MEAS - AVA(I,A): 3.8 CM^2
BH CV ECHO MEAS - AVA(I,D): 3.8 CM^2
BH CV ECHO MEAS - AVA(V,A): 3.5 CM^2
BH CV ECHO MEAS - AVA(V,D): 3.5 CM^2
BH CV ECHO MEAS - BSA(HAYCOCK): 3 M^2
BH CV ECHO MEAS - BSA: 2.9 M^2
BH CV ECHO MEAS - BZI_BMI: 41 KILOGRAMS/M^2
BH CV ECHO MEAS - BZI_METRIC_HEIGHT: 198.1 CM
BH CV ECHO MEAS - BZI_METRIC_WEIGHT: 161 KG
BH CV ECHO MEAS - EDV(CUBED): 131.4 ML
BH CV ECHO MEAS - EDV(MOD-SP2): 194 ML
BH CV ECHO MEAS - EDV(MOD-SP4): 188 ML
BH CV ECHO MEAS - EDV(TEICH): 122.9 ML
BH CV ECHO MEAS - EF(CUBED): 74.5 %
BH CV ECHO MEAS - EF(MOD-BP): 63 %
BH CV ECHO MEAS - EF(MOD-SP2): 62.4 %
BH CV ECHO MEAS - EF(MOD-SP4): 64.9 %
BH CV ECHO MEAS - EF(TEICH): 66.1 %
BH CV ECHO MEAS - ESV(CUBED): 33.5 ML
BH CV ECHO MEAS - ESV(MOD-SP2): 73 ML
BH CV ECHO MEAS - ESV(MOD-SP4): 66 ML
BH CV ECHO MEAS - ESV(TEICH): 41.7 ML
BH CV ECHO MEAS - FS: 36.6 %
BH CV ECHO MEAS - IVS/LVPW: 1
BH CV ECHO MEAS - IVSD: 1.1 CM
BH CV ECHO MEAS - LA DIMENSION: 4.7 CM
BH CV ECHO MEAS - LA/AO: 1.6
BH CV ECHO MEAS - LAD MAJOR: 6.1 CM
BH CV ECHO MEAS - LAT PEAK E' VEL: 11.7 CM/SEC
BH CV ECHO MEAS - LATERAL E/E' RATIO: 10.8
BH CV ECHO MEAS - LV DIASTOLIC VOL/BSA (35-75): 65.3 ML/M^2
BH CV ECHO MEAS - LV IVRT: 0.05 SEC
BH CV ECHO MEAS - LV MASS(C)D: 197.7 GRAMS
BH CV ECHO MEAS - LV MASS(C)DI: 68.6 GRAMS/M^2
BH CV ECHO MEAS - LV MAX PG: 3.6 MMHG
BH CV ECHO MEAS - LV MEAN PG: 1.8 MMHG
BH CV ECHO MEAS - LV SYSTOLIC VOL/BSA (12-30): 22.9 ML/M^2
BH CV ECHO MEAS - LV V1 MAX: 95.3 CM/SEC
BH CV ECHO MEAS - LV V1 MEAN: 63.8 CM/SEC
BH CV ECHO MEAS - LV V1 VTI: 26.3 CM
BH CV ECHO MEAS - LVIDD: 5.1 CM
BH CV ECHO MEAS - LVIDS: 3.2 CM
BH CV ECHO MEAS - LVLD AP2: 10.3 CM
BH CV ECHO MEAS - LVLD AP4: 10.3 CM
BH CV ECHO MEAS - LVLS AP2: 8.7 CM
BH CV ECHO MEAS - LVLS AP4: 8.3 CM
BH CV ECHO MEAS - LVOT AREA (M): 3.8 CM^2
BH CV ECHO MEAS - LVOT AREA: 3.8 CM^2
BH CV ECHO MEAS - LVOT DIAM: 2.2 CM
BH CV ECHO MEAS - LVPWD: 1 CM
BH CV ECHO MEAS - MED PEAK E' VEL: 14.7 CM/SEC
BH CV ECHO MEAS - MEDIAL E/E' RATIO: 8.6
BH CV ECHO MEAS - MV A MAX VEL: 35.5 CM/SEC
BH CV ECHO MEAS - MV DEC SLOPE: 698.1 CM/SEC^2
BH CV ECHO MEAS - MV DEC TIME: 0.16 SEC
BH CV ECHO MEAS - MV E MAX VEL: 127.3 CM/SEC
BH CV ECHO MEAS - MV E/A: 3.6
BH CV ECHO MEAS - MV P1/2T MAX VEL: 148 CM/SEC
BH CV ECHO MEAS - MV P1/2T: 62.1 MSEC
BH CV ECHO MEAS - MVA P1/2T LCG: 1.5 CM^2
BH CV ECHO MEAS - MVA(P1/2T): 3.5 CM^2
BH CV ECHO MEAS - PA ACC SLOPE: 275.5 CM/SEC^2
BH CV ECHO MEAS - PA ACC TIME: 0.2 SEC
BH CV ECHO MEAS - PA PR(ACCEL): -12.9 MMHG
BH CV ECHO MEAS - PI END-D VEL: 97.7 CM/SEC
BH CV ECHO MEAS - RVDD: 2.9 CM
BH CV ECHO MEAS - SI(AO): 60.3 ML/M^2
BH CV ECHO MEAS - SI(CUBED): 34 ML/M^2
BH CV ECHO MEAS - SI(LVOT): 35.1 ML/M^2
BH CV ECHO MEAS - SI(MOD-SP2): 42 ML/M^2
BH CV ECHO MEAS - SI(MOD-SP4): 42.3 ML/M^2
BH CV ECHO MEAS - SI(TEICH): 28.2 ML/M^2
BH CV ECHO MEAS - SV(AO): 173.7 ML
BH CV ECHO MEAS - SV(CUBED): 98 ML
BH CV ECHO MEAS - SV(LVOT): 101.1 ML
BH CV ECHO MEAS - SV(MOD-SP2): 121 ML
BH CV ECHO MEAS - SV(MOD-SP4): 122 ML
BH CV ECHO MEAS - SV(TEICH): 81.3 ML
BH CV ECHO MEAS - TAPSE (>1.6): 2.6 CM
BH CV ECHO MEASUREMENTS AVERAGE E/E' RATIO: 9.64
BH CV VAS BP LEFT ARM: NORMAL MMHG
BH CV XLRA - RV BASE: 3.9 CM
BH CV XLRA - RV LENGTH: 8.4 CM
BH CV XLRA - RV MID: 3.2 CM
BH CV XLRA - TDI S': 20.8 CM/SEC
BILIRUB SERPL-MCNC: 0.6 MG/DL (ref 0–1.2)
BUN SERPL-MCNC: 17 MG/DL (ref 6–20)
BUN/CREAT SERPL: 19.1 (ref 7–25)
CALCIUM SPEC-SCNC: 9 MG/DL (ref 8.6–10.5)
CHLORIDE SERPL-SCNC: 110 MMOL/L (ref 98–107)
CO2 SERPL-SCNC: 23 MMOL/L (ref 22–29)
CREAT SERPL-MCNC: 0.89 MG/DL (ref 0.76–1.27)
DEPRECATED RDW RBC AUTO: 52.7 FL (ref 37–54)
EOSINOPHIL # BLD AUTO: 0.21 10*3/MM3 (ref 0–0.4)
EOSINOPHIL NFR BLD AUTO: 4.4 % (ref 0.3–6.2)
ERYTHROCYTE [DISTWIDTH] IN BLOOD BY AUTOMATED COUNT: 17.1 % (ref 12.3–15.4)
FLUAV RNA RESP QL NAA+PROBE: NOT DETECTED
FLUBV RNA RESP QL NAA+PROBE: NOT DETECTED
GFR SERPL CREATININE-BSD FRML MDRD: 95 ML/MIN/1.73
GLOBULIN UR ELPH-MCNC: 2.1 GM/DL
GLUCOSE SERPL-MCNC: 91 MG/DL (ref 65–99)
HCT VFR BLD AUTO: 39.1 % (ref 37.5–51)
HGB BLD-MCNC: 12.7 G/DL (ref 13–17.7)
HOLD SPECIMEN: NORMAL
IMM GRANULOCYTES # BLD AUTO: 0.01 10*3/MM3 (ref 0–0.05)
IMM GRANULOCYTES NFR BLD AUTO: 0.2 % (ref 0–0.5)
LEFT ATRIUM VOLUME INDEX: 26.7 ML/M^2
LEFT ATRIUM VOLUME: 77 ML
LYMPHOCYTES # BLD AUTO: 1.26 10*3/MM3 (ref 0.7–3.1)
LYMPHOCYTES NFR BLD AUTO: 26.5 % (ref 19.6–45.3)
MAGNESIUM SERPL-MCNC: 2.1 MG/DL (ref 1.6–2.6)
MCH RBC QN AUTO: 28 PG (ref 26.6–33)
MCHC RBC AUTO-ENTMCNC: 32.5 G/DL (ref 31.5–35.7)
MCV RBC AUTO: 86.3 FL (ref 79–97)
MONOCYTES # BLD AUTO: 0.29 10*3/MM3 (ref 0.1–0.9)
MONOCYTES NFR BLD AUTO: 6.1 % (ref 5–12)
NEUTROPHILS NFR BLD AUTO: 2.96 10*3/MM3 (ref 1.7–7)
NEUTROPHILS NFR BLD AUTO: 62.2 % (ref 42.7–76)
NRBC BLD AUTO-RTO: 0 /100 WBC (ref 0–0.2)
NT-PROBNP SERPL-MCNC: 633.9 PG/ML (ref 0–450)
PLATELET # BLD AUTO: 110 10*3/MM3 (ref 140–450)
PMV BLD AUTO: 11.7 FL (ref 6–12)
POTASSIUM SERPL-SCNC: 3.8 MMOL/L (ref 3.5–5.2)
PROT SERPL-MCNC: 6.3 G/DL (ref 6–8.5)
QT INTERVAL: 514 MS
QTC INTERVAL: 424 MS
RBC # BLD AUTO: 4.53 10*6/MM3 (ref 4.14–5.8)
RSV RNA NPH QL NAA+NON-PROBE: NOT DETECTED
SARS-COV-2 RNA RESP QL NAA+PROBE: NOT DETECTED
SODIUM SERPL-SCNC: 145 MMOL/L (ref 136–145)
TROPONIN T SERPL-MCNC: <0.01 NG/ML (ref 0–0.03)
TSH SERPL DL<=0.05 MIU/L-ACNC: 1.76 UIU/ML (ref 0.27–4.2)
WBC NRBC COR # BLD: 4.76 10*3/MM3 (ref 3.4–10.8)
WHOLE BLOOD HOLD SPECIMEN: NORMAL
WHOLE BLOOD HOLD SPECIMEN: NORMAL

## 2022-01-12 PROCEDURE — 80050 GENERAL HEALTH PANEL: CPT | Performed by: EMERGENCY MEDICINE

## 2022-01-12 PROCEDURE — 87637 SARSCOV2&INF A&B&RSV AMP PRB: CPT | Performed by: EMERGENCY MEDICINE

## 2022-01-12 PROCEDURE — 96374 THER/PROPH/DIAG INJ IV PUSH: CPT

## 2022-01-12 PROCEDURE — 36415 COLL VENOUS BLD VENIPUNCTURE: CPT

## 2022-01-12 PROCEDURE — 84484 ASSAY OF TROPONIN QUANT: CPT | Performed by: EMERGENCY MEDICINE

## 2022-01-12 PROCEDURE — C9803 HOPD COVID-19 SPEC COLLECT: HCPCS | Performed by: EMERGENCY MEDICINE

## 2022-01-12 PROCEDURE — 93306 TTE W/DOPPLER COMPLETE: CPT | Performed by: INTERNAL MEDICINE

## 2022-01-12 PROCEDURE — 76705 ECHO EXAM OF ABDOMEN: CPT

## 2022-01-12 PROCEDURE — G0378 HOSPITAL OBSERVATION PER HR: HCPCS

## 2022-01-12 PROCEDURE — 25010000002 ATROPINE PER 0.01 MG: Performed by: PHYSICIAN ASSISTANT

## 2022-01-12 PROCEDURE — 83880 ASSAY OF NATRIURETIC PEPTIDE: CPT | Performed by: EMERGENCY MEDICINE

## 2022-01-12 PROCEDURE — 99285 EMERGENCY DEPT VISIT HI MDM: CPT

## 2022-01-12 PROCEDURE — 83735 ASSAY OF MAGNESIUM: CPT | Performed by: EMERGENCY MEDICINE

## 2022-01-12 PROCEDURE — 71045 X-RAY EXAM CHEST 1 VIEW: CPT

## 2022-01-12 PROCEDURE — 25010000002 SULFUR HEXAFLUORIDE MICROSPH 60.7-25 MG RECONSTITUTED SUSPENSION: Performed by: HOSPITALIST

## 2022-01-12 PROCEDURE — 99204 OFFICE O/P NEW MOD 45 MIN: CPT | Performed by: SURGERY

## 2022-01-12 PROCEDURE — 93005 ELECTROCARDIOGRAM TRACING: CPT | Performed by: EMERGENCY MEDICINE

## 2022-01-12 PROCEDURE — 99219 PR INITIAL OBSERVATION CARE/DAY 50 MINUTES: CPT | Performed by: INTERNAL MEDICINE

## 2022-01-12 PROCEDURE — 93306 TTE W/DOPPLER COMPLETE: CPT

## 2022-01-12 RX ORDER — ONDANSETRON 2 MG/ML
4 INJECTION INTRAMUSCULAR; INTRAVENOUS ONCE
Status: DISCONTINUED | OUTPATIENT
Start: 2022-01-12 | End: 2022-01-13 | Stop reason: HOSPADM

## 2022-01-12 RX ORDER — CHOLECALCIFEROL (VITAMIN D3) 125 MCG
5 CAPSULE ORAL NIGHTLY PRN
Status: DISCONTINUED | OUTPATIENT
Start: 2022-01-12 | End: 2022-01-13 | Stop reason: HOSPADM

## 2022-01-12 RX ORDER — LISINOPRIL 20 MG/1
20 TABLET ORAL
Status: DISCONTINUED | OUTPATIENT
Start: 2022-01-12 | End: 2022-01-13 | Stop reason: HOSPADM

## 2022-01-12 RX ORDER — ROSUVASTATIN CALCIUM 20 MG/1
40 TABLET, COATED ORAL NIGHTLY
Status: DISCONTINUED | OUTPATIENT
Start: 2022-01-12 | End: 2022-01-13 | Stop reason: HOSPADM

## 2022-01-12 RX ORDER — BENAZEPRIL HYDROCHLORIDE 20 MG/1
20 TABLET ORAL DAILY
COMMUNITY
End: 2023-01-22

## 2022-01-12 RX ORDER — FAMOTIDINE 20 MG/1
20 TABLET, FILM COATED ORAL
Status: DISCONTINUED | OUTPATIENT
Start: 2022-01-13 | End: 2022-01-13 | Stop reason: HOSPADM

## 2022-01-12 RX ORDER — VALSARTAN 80 MG/1
80 TABLET ORAL EVERY 12 HOURS SCHEDULED
Status: DISCONTINUED | OUTPATIENT
Start: 2022-01-12 | End: 2022-01-12

## 2022-01-12 RX ORDER — HYDRALAZINE HYDROCHLORIDE 25 MG/1
25 TABLET, FILM COATED ORAL EVERY 6 HOURS PRN
Status: DISCONTINUED | OUTPATIENT
Start: 2022-01-12 | End: 2022-01-13 | Stop reason: HOSPADM

## 2022-01-12 RX ORDER — ACETAMINOPHEN 325 MG/1
650 TABLET ORAL EVERY 6 HOURS PRN
Status: DISCONTINUED | OUTPATIENT
Start: 2022-01-12 | End: 2022-01-13 | Stop reason: HOSPADM

## 2022-01-12 RX ORDER — HYDRALAZINE HYDROCHLORIDE 25 MG/1
25 TABLET, FILM COATED ORAL EVERY 6 HOURS SCHEDULED
Status: DISCONTINUED | OUTPATIENT
Start: 2022-01-12 | End: 2022-01-12

## 2022-01-12 RX ORDER — SERTRALINE HYDROCHLORIDE 100 MG/1
100 TABLET, FILM COATED ORAL DAILY
COMMUNITY
End: 2022-03-22 | Stop reason: DRUGHIGH

## 2022-01-12 RX ORDER — ATROPINE SULFATE 1 MG/ML
0.5 INJECTION, SOLUTION INTRAMUSCULAR; INTRAVENOUS; SUBCUTANEOUS ONCE
Status: COMPLETED | OUTPATIENT
Start: 2022-01-12 | End: 2022-01-12

## 2022-01-12 RX ORDER — SODIUM CHLORIDE 0.9 % (FLUSH) 0.9 %
10 SYRINGE (ML) INJECTION AS NEEDED
Status: DISCONTINUED | OUTPATIENT
Start: 2022-01-12 | End: 2022-01-13 | Stop reason: HOSPADM

## 2022-01-12 RX ORDER — PANTOPRAZOLE SODIUM 40 MG/1
40 TABLET, DELAYED RELEASE ORAL EVERY MORNING
Status: DISCONTINUED | OUTPATIENT
Start: 2022-01-12 | End: 2022-01-12

## 2022-01-12 RX ORDER — SERTRALINE HYDROCHLORIDE 100 MG/1
100 TABLET, FILM COATED ORAL DAILY
Status: DISCONTINUED | OUTPATIENT
Start: 2022-01-12 | End: 2022-01-13 | Stop reason: HOSPADM

## 2022-01-12 RX ORDER — ONDANSETRON 2 MG/ML
4 INJECTION INTRAMUSCULAR; INTRAVENOUS EVERY 6 HOURS PRN
Status: DISCONTINUED | OUTPATIENT
Start: 2022-01-12 | End: 2022-01-13 | Stop reason: HOSPADM

## 2022-01-12 RX ORDER — PANTOPRAZOLE SODIUM 40 MG/1
40 TABLET, DELAYED RELEASE ORAL
Status: DISCONTINUED | OUTPATIENT
Start: 2022-01-12 | End: 2022-01-13 | Stop reason: HOSPADM

## 2022-01-12 RX ORDER — AMLODIPINE BESYLATE 10 MG/1
10 TABLET ORAL
Status: DISCONTINUED | OUTPATIENT
Start: 2022-01-12 | End: 2022-01-12

## 2022-01-12 RX ADMIN — ATROPINE SULFATE 0.5 MG: 1 INJECTION, SOLUTION INTRAMUSCULAR; INTRAVENOUS; SUBCUTANEOUS at 11:59

## 2022-01-12 RX ADMIN — SERTRALINE HYDROCHLORIDE 100 MG: 100 TABLET ORAL at 16:48

## 2022-01-12 RX ADMIN — Medication 5 MG: at 22:08

## 2022-01-12 RX ADMIN — ACETAMINOPHEN 650 MG: 325 TABLET, FILM COATED ORAL at 16:52

## 2022-01-12 RX ADMIN — ROSUVASTATIN CALCIUM 40 MG: 20 TABLET, COATED ORAL at 22:08

## 2022-01-12 RX ADMIN — SULFUR HEXAFLUORIDE 2 ML: KIT at 14:56

## 2022-01-12 RX ADMIN — SODIUM CHLORIDE, PRESERVATIVE FREE 10 ML: 5 INJECTION INTRAVENOUS at 22:08

## 2022-01-12 RX ADMIN — ACETAMINOPHEN 650 MG: 325 TABLET, FILM COATED ORAL at 22:08

## 2022-01-12 RX ADMIN — PANTOPRAZOLE SODIUM 40 MG: 40 TABLET, DELAYED RELEASE ORAL at 16:48

## 2022-01-12 RX ADMIN — LISINOPRIL 20 MG: 20 TABLET ORAL at 16:48

## 2022-01-12 NOTE — ED PROVIDER NOTES
"Subjective   Mr. Russell is a pleasant 41-year-old male who presents via EMS with slow heart rate.  The patient states that he underwent gastric sleeve procedure on 12/6/2021 by [facility].  Since his surgery, he has been experiencing low heart rate.  The patient saw his PCP last week for it and was taken off of his beta-blocker but his bradycardia persists.  Last night, the patient's heart rate dropped to 28 bpm.  The patient works at [email or web address].  He had it rechecked today and his rate was 42.  He denies any associated chest pain or shortness of breath.  He notes that he gets dizzy when he stands up.  He has also been having frequent headaches since his surgery.  He notes some occasional palpitations in his chest, especially at night.  He states his abdomen has been doing well but notes that after eating, he feels a \"tightness\" in the chest and often has belching after eating.  He has had no fever.  Past medical history includes obesity, DM II (on metformin), HTN, and hyperlipidemia.  He is a non-smoker.  No alcohol or drug use.  He notes that his sister had problems with bradycardia and had to have a pacemaker placed at age 36.            Review of Systems   Constitutional: Negative for chills and fever.   HENT: Negative for sore throat.    Respiratory: Negative for cough and shortness of breath.    Cardiovascular: Positive for palpitations. Negative for chest pain.   Gastrointestinal: Negative for abdominal pain, constipation, diarrhea, nausea and vomiting.   Genitourinary: Negative for dysuria.   Musculoskeletal: Negative for back pain.   Skin: Negative for pallor.   Allergic/Immunologic: Negative for immunocompromised state.   Neurological: Positive for light-headedness and headaches. Negative for weakness.   Hematological: Negative.    Psychiatric/Behavioral: Negative.        Past Medical History:   Diagnosis Date   • Diabetes mellitus (HCC)    • Hyperlipidemia        Allergies "   Allergen Reactions   • Clarithromycin Hives   • Codeine Hives   • Fortaz [Ceftazidime] Hives   • Penicillins Hives   • Unasyn [Ampicillin-Sulbactam Sodium] Hives       Past Surgical History:   Procedure Laterality Date   • EAR TUBES     • GASTRIC SLEEVE LAPAROSCOPIC     • HIP SURGERY     • SINUS SURGERY     • TONSILLECTOMY         History reviewed. No pertinent family history.    Social History     Socioeconomic History   • Marital status:    Tobacco Use   • Smoking status: Former Smoker     Packs/day: 0.50     Types: Cigarettes     Quit date: 2021     Years since quittin.6   • Smokeless tobacco: Never Used   Substance and Sexual Activity   • Alcohol use: Not Currently   • Drug use: Never   • Sexual activity: Defer           Objective   Physical Exam  Constitutional:       General: He is not in acute distress.     Appearance: Normal appearance. He is not ill-appearing or diaphoretic.   HENT:      Head: Normocephalic.      Nose: Nose normal.      Mouth/Throat:      Mouth: Mucous membranes are moist.   Eyes:      General: No scleral icterus.     Conjunctiva/sclera: Conjunctivae normal.      Pupils: Pupils are equal, round, and reactive to light.   Cardiovascular:      Rate and Rhythm: Bradycardia present.      Heart sounds: No murmur heard.       Comments: Bradycardic with rate of 42.    Pulmonary:      Effort: Pulmonary effort is normal. No respiratory distress.      Breath sounds: Normal breath sounds.   Abdominal:      General: Bowel sounds are normal.      Palpations: Abdomen is soft.      Tenderness: There is no abdominal tenderness. There is no guarding.   Musculoskeletal:         General: Normal range of motion.      Cervical back: Normal range of motion and neck supple.   Skin:     General: Skin is warm and dry.      Coloration: Skin is not pale.      Findings: No rash.   Neurological:      General: No focal deficit present.      Mental Status: He is alert and oriented to person, place, and  time.   Psychiatric:         Mood and Affect: Mood normal.         Procedures           ED Course      The pt is resting comfortably.  He denies current symptoms.  His rate is around 40 (sinus on the monitor).  He is quite hypertensive, currently at 201/89.  He has been off his Toprol for a week now.  He is still taking olmesartan.  I will start him on Cardene drip and get labs.  I suspect his bradycardia may be secondary to increased vagal tone s/p his gastric sleeve procedure, especially since it all started after his procedure.     13:06 EST  I was informed by the RN that on recheck, before starting Cardene, his BP was down to 159 systolic.  Will hold Cardene for now.    Troponin is negative at <0.010.  No concerning labs.  Pt remains with rate at 42.  Will have cardiology come see him in ED.    13:06 EST  Pt now down to 38 BPM.  He states he feels a bit lightheaded.  No pain.  I have ordered a vagolytic dose of atropine 0.5 mg IV.    13:06 EST  Cardiology has seen pt and is planning to admit for further workup of bradycardia.                                                 MDM    Final diagnoses:   Sinus bradycardia       ED Disposition  ED Disposition     ED Disposition Condition Comment    Decision to Admit  Level of Care: Telemetry [5]   Diagnosis: Bradycardia [[id number]]   Admitting Physician: NOE WING [[id number]]   Attending Physician: NOE WING [[id number]]            No follow-up provider specified.       Medication List      No changes were made to your prescriptions during this visit.          Gage Charles, PA  01/12/22 [id number]
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
## 2022-01-13 ENCOUNTER — APPOINTMENT (OUTPATIENT)
Dept: GENERAL RADIOLOGY | Facility: HOSPITAL | Age: 42
End: 2022-01-13

## 2022-01-13 ENCOUNTER — APPOINTMENT (OUTPATIENT)
Dept: CT IMAGING | Facility: HOSPITAL | Age: 42
End: 2022-01-13

## 2022-01-13 ENCOUNTER — TELEPHONE (OUTPATIENT)
Dept: CARDIOLOGY | Facility: CLINIC | Age: 42
End: 2022-01-13

## 2022-01-13 VITALS
OXYGEN SATURATION: 96 % | HEIGHT: 78 IN | DIASTOLIC BLOOD PRESSURE: 95 MMHG | SYSTOLIC BLOOD PRESSURE: 167 MMHG | HEART RATE: 43 BPM | RESPIRATION RATE: 18 BRPM | BODY MASS INDEX: 36.45 KG/M2 | WEIGHT: 315 LBS | TEMPERATURE: 97.4 F

## 2022-01-13 DIAGNOSIS — R00.1 BRADYCARDIA: Primary | ICD-10-CM

## 2022-01-13 LAB
ANION GAP SERPL CALCULATED.3IONS-SCNC: 9 MMOL/L (ref 5–15)
BUN SERPL-MCNC: 17 MG/DL (ref 6–20)
BUN/CREAT SERPL: 18.3 (ref 7–25)
CALCIUM SPEC-SCNC: 9.1 MG/DL (ref 8.6–10.5)
CHLORIDE SERPL-SCNC: 108 MMOL/L (ref 98–107)
CO2 SERPL-SCNC: 25 MMOL/L (ref 22–29)
CREAT SERPL-MCNC: 0.93 MG/DL (ref 0.76–1.27)
GFR SERPL CREATININE-BSD FRML MDRD: 90 ML/MIN/1.73
GLUCOSE SERPL-MCNC: 88 MG/DL (ref 65–99)
POTASSIUM SERPL-SCNC: 3.7 MMOL/L (ref 3.5–5.2)
QT INTERVAL: 538 MS
QTC INTERVAL: 438 MS
SODIUM SERPL-SCNC: 142 MMOL/L (ref 136–145)

## 2022-01-13 PROCEDURE — 74220 X-RAY XM ESOPHAGUS 1CNTRST: CPT

## 2022-01-13 PROCEDURE — 25010000002 DEXAMETHASONE PER 1 MG: Performed by: PSYCHIATRY & NEUROLOGY

## 2022-01-13 PROCEDURE — 96375 TX/PRO/DX INJ NEW DRUG ADDON: CPT

## 2022-01-13 PROCEDURE — 99217 PR OBSERVATION CARE DISCHARGE MANAGEMENT: CPT | Performed by: INTERNAL MEDICINE

## 2022-01-13 PROCEDURE — 25010000002 KETOROLAC TROMETHAMINE PER 15 MG: Performed by: INTERNAL MEDICINE

## 2022-01-13 PROCEDURE — G0378 HOSPITAL OBSERVATION PER HR: HCPCS

## 2022-01-13 PROCEDURE — 25010000002 PROCHLORPERAZINE 10 MG/2ML SOLUTION: Performed by: PSYCHIATRY & NEUROLOGY

## 2022-01-13 PROCEDURE — 99214 OFFICE O/P EST MOD 30 MIN: CPT | Performed by: SURGERY

## 2022-01-13 PROCEDURE — 93010 ELECTROCARDIOGRAM REPORT: CPT | Performed by: INTERNAL MEDICINE

## 2022-01-13 PROCEDURE — 70450 CT HEAD/BRAIN W/O DYE: CPT

## 2022-01-13 PROCEDURE — 93005 ELECTROCARDIOGRAM TRACING: CPT | Performed by: INTERNAL MEDICINE

## 2022-01-13 PROCEDURE — 25010000002 DIPHENHYDRAMINE PER 50 MG: Performed by: PSYCHIATRY & NEUROLOGY

## 2022-01-13 PROCEDURE — 72040 X-RAY EXAM NECK SPINE 2-3 VW: CPT

## 2022-01-13 PROCEDURE — 80048 BASIC METABOLIC PNL TOTAL CA: CPT | Performed by: INTERNAL MEDICINE

## 2022-01-13 RX ORDER — KETOROLAC TROMETHAMINE 30 MG/ML
30 INJECTION, SOLUTION INTRAMUSCULAR; INTRAVENOUS ONCE
Status: COMPLETED | OUTPATIENT
Start: 2022-01-13 | End: 2022-01-13

## 2022-01-13 RX ORDER — FAMOTIDINE 20 MG/1
20 TABLET, FILM COATED ORAL
Qty: 60 TABLET | Refills: 3 | Status: SHIPPED | OUTPATIENT
Start: 2022-01-13 | End: 2022-11-30

## 2022-01-13 RX ORDER — PANTOPRAZOLE SODIUM 40 MG/1
40 TABLET, DELAYED RELEASE ORAL
Qty: 60 TABLET | Refills: 3 | Status: SHIPPED | OUTPATIENT
Start: 2022-01-13 | End: 2022-11-30

## 2022-01-13 RX ORDER — DEXAMETHASONE SODIUM PHOSPHATE 10 MG/ML
10 INJECTION INTRAMUSCULAR; INTRAVENOUS ONCE
Status: COMPLETED | OUTPATIENT
Start: 2022-01-13 | End: 2022-01-13

## 2022-01-13 RX ORDER — DIPHENHYDRAMINE HYDROCHLORIDE 50 MG/ML
25 INJECTION INTRAMUSCULAR; INTRAVENOUS ONCE
Status: COMPLETED | OUTPATIENT
Start: 2022-01-13 | End: 2022-01-13

## 2022-01-13 RX ORDER — PROCHLORPERAZINE EDISYLATE 5 MG/ML
10 INJECTION INTRAMUSCULAR; INTRAVENOUS ONCE
Status: COMPLETED | OUTPATIENT
Start: 2022-01-13 | End: 2022-01-13

## 2022-01-13 RX ADMIN — PROCHLORPERAZINE EDISYLATE 10 MG: 5 INJECTION, SOLUTION INTRAMUSCULAR; INTRAVENOUS at 16:58

## 2022-01-13 RX ADMIN — FAMOTIDINE 20 MG: 20 TABLET, FILM COATED ORAL at 16:59

## 2022-01-13 RX ADMIN — SERTRALINE HYDROCHLORIDE 100 MG: 100 TABLET ORAL at 08:37

## 2022-01-13 RX ADMIN — DIPHENHYDRAMINE HYDROCHLORIDE 25 MG: 50 INJECTION INTRAMUSCULAR; INTRAVENOUS at 16:58

## 2022-01-13 RX ADMIN — PANTOPRAZOLE SODIUM 40 MG: 40 TABLET, DELAYED RELEASE ORAL at 08:37

## 2022-01-13 RX ADMIN — KETOROLAC TROMETHAMINE 30 MG: 30 INJECTION, SOLUTION INTRAMUSCULAR at 08:37

## 2022-01-13 RX ADMIN — BARIUM SULFATE 183 ML: 960 POWDER, FOR SUSPENSION ORAL at 09:14

## 2022-01-13 RX ADMIN — DEXAMETHASONE SODIUM PHOSPHATE 10 MG: 10 INJECTION INTRAMUSCULAR; INTRAVENOUS at 17:10

## 2022-01-13 RX ADMIN — METFORMIN HYDROCHLORIDE 2000 MG: 1000 TABLET ORAL at 08:37

## 2022-01-13 RX ADMIN — LISINOPRIL 20 MG: 20 TABLET ORAL at 08:37

## 2022-01-13 RX ADMIN — PANTOPRAZOLE SODIUM 40 MG: 40 TABLET, DELAYED RELEASE ORAL at 16:59

## 2022-01-13 RX ADMIN — ACETAMINOPHEN 650 MG: 325 TABLET, FILM COATED ORAL at 11:46

## 2022-01-13 RX ADMIN — FAMOTIDINE 20 MG: 20 TABLET, FILM COATED ORAL at 08:37

## 2022-01-13 RX ADMIN — ACETAMINOPHEN 650 MG: 325 TABLET, FILM COATED ORAL at 04:06

## 2022-01-13 NOTE — CONSULTS
Neurology Note    Patient:  Dom Russell    YOB: 1980    REFERRING PHYSICIAN:  Dr. Casanova  CHIEF COMPLAINT:    headache    HISTORY OF PRESENT ILLNESS:   The patient is a 41 y.o. male with DM, HTN, h/o a TIA, gastric bypass, p/w c/o bradycardia and dizziness yday, admitted for observation, evaluated by cardiology. He c/o a headache for one week, all over, at times throbbing a/w photophobia. Denies head trauma. He has been afebrile. COVID negative. CT head negative completed, negative to my eye. Currently HA is 5/10, no positional, no nuchal rigidity. Headache improved with Tylenol and Toradol. Reports a prior h/o headaches. MRI brain and CTA normal in .    Past Medical History:  Past Medical History:   Diagnosis Date   • Diabetes mellitus (HCC)    • Hyperlipidemia        Past Surgical History:  Past Surgical History:   Procedure Laterality Date   • EAR TUBES     • GASTRIC SLEEVE LAPAROSCOPIC     • HIP SURGERY     • SINUS SURGERY     • TONSILLECTOMY         Social History:   Social History     Socioeconomic History   • Marital status:    Tobacco Use   • Smoking status: Former Smoker     Packs/day: 0.50     Types: Cigarettes     Quit date: 2021     Years since quittin.6   • Smokeless tobacco: Never Used   Substance and Sexual Activity   • Alcohol use: Not Currently   • Drug use: Never   • Sexual activity: Defer        Family History:   Family History   Problem Relation Age of Onset   • Bradycardia Sister        Medications Prior to Admission:    Prior to Admission medications    Medication Sig Start Date End Date Taking? Authorizing Provider   acetaminophen (TYLENOL) 325 MG tablet Take 2 tablets by mouth Every 4 (Four) Hours As Needed for Mild Pain  or Fever (Temperature greater than or equal to 37 C). 20  Yes Moira Ledezma PA-C   benazepril (LOTENSIN) 20 MG tablet Take 20 mg by mouth Daily.   Yes Provider, MD Chris   lansoprazole (PREVACID) 30 MG capsule Take 30 mg by  mouth Daily.   Yes Chris Juarez MD   metFORMIN (GLUCOPHAGE) 1000 MG tablet Take 2,000 mg by mouth Daily With Breakfast.   Yes Chris Juarez MD   rosuvastatin (CRESTOR) 40 MG tablet Take 1 tablet by mouth Every Night for 30 days. 11/24/20 1/12/22 Yes Moira Ledezma PA-C   sertraline (ZOLOFT) 100 MG tablet Take 100 mg by mouth Daily.   Yes Chris Juarez MD   aspirin 81 MG chewable tablet Chew 81 mg Daily.    Chris Juarez MD   Empagliflozin (Jardiance) 25 MG tablet Take 1 tablet by mouth Daily.    Chris Juarez MD   famotidine (PEPCID) 20 MG tablet Take 1 tablet by mouth 2 (Two) Times a Day Before Meals. 1/13/22   Kevin Perez MD   fenofibrate micronized (LOFIBRA) 134 MG capsule Take 134 mg by mouth Every Morning Before Breakfast.    Chris Juarez MD   metoprolol succinate XL (TOPROL-XL) 100 MG 24 hr tablet Take 100 mg by mouth Daily.    Chris Juarez MD   Olmesartan-amLODIPine-HCTZ (Tribenzor) 40-5-25 MG tablet Take 1 tablet by mouth Daily.    Chris Juarez MD   pantoprazole (PROTONIX) 40 MG EC tablet Take 1 tablet by mouth 2 (Two) Times a Day Before Meals. 1/13/22   Kevin Perez MD   SITagliptin (JANUVIA) 100 MG tablet Take 100 mg by mouth Daily.    Chris Juarez MD       Allergies:  Clarithromycin, Codeine, Fortaz [ceftazidime], Penicillins, and Unasyn [ampicillin-sulbactam sodium]      Review of system  Review of Systems   Eyes: Positive for visual disturbance.   Neurological: Positive for dizziness and headaches.   All other systems reviewed and are negative.      Vitals:    01/13/22 1500   BP:    Pulse: (!) 38   Resp:    Temp:    SpO2:        Physical exam  Physical Exam  Constitutional:       Appearance: He is well-developed.   HENT:      Head: Normocephalic and atraumatic.   Eyes:      Extraocular Movements: Extraocular movements intact.      Pupils: Pupils are equal, round, and reactive to light.   Cardiovascular:       Rate and Rhythm: Normal rate and regular rhythm.   Pulmonary:      Effort: Pulmonary effort is normal.   Musculoskeletal:      Cervical back: Neck supple.   Neurological:      Mental Status: He is alert and oriented to person, place, and time.      Deep Tendon Reflexes: Reflexes are normal and symmetric. Babinski sign absent on the right side. Babinski sign absent on the left side.      Comments: Speech clear, VFF, no facial droop, no limb drift.   Psychiatric:         Behavior: Behavior normal.         Thought Content: Thought content normal.           Lab Results   Component Value Date    WBC 4.76 01/12/2022    HGB 12.7 (L) 01/12/2022    HCT 39.1 01/12/2022    MCV 86.3 01/12/2022     (L) 01/12/2022     Lab Results   Component Value Date    GLUCOSE 88 01/13/2022    BUN 17 01/13/2022    CREATININE 0.93 01/13/2022    EGFRIFNONA 90 01/13/2022    BCR 18.3 01/13/2022    CO2 25.0 01/13/2022    CALCIUM 9.1 01/13/2022    ALBUMIN 4.20 01/12/2022    AST 20 01/12/2022    ALT 24 01/12/2022         Radiological Studies:  Adult Transthoracic Echo Complete W/ Cont if Necessary Per Protocol    Result Date: 1/12/2022  · Left ventricular ejection fraction appears to be 61 - 65%. Left ventricular systolic function is normal. · The cardiac valves are structurally and functionally within normal limits.      XR Spine Cervical 2 or 3 View    Result Date: 1/13/2022  EXAMINATION: XR SPINE CERVICAL 2 OR 3 VW-  INDICATION: frequent headache; R00.1-Bradycardia, unspecified  COMPARISON: NONE  FINDINGS: Vertebral body heights are maintained without evidence of acute fracture and alignment is anatomic without evidence of listhesis or subluxation. Minimal spondylosis change is noted some small osteophytes. The prevertebral soft tissues are unremarkable. The dens is intact.      Vertebral body heights are maintained without evidence of acute fracture and alignment is anatomic without evidence of listhesis or subluxation. Minimal spondylosis  change is noted some small osteophytes. The prevertebral soft tissues are unremarkable. The dens is intact.  This report was finalized on 1/13/2022 12:09 PM by Lester Andino.      FL Esophagram Complete Single Contrast    Result Date: 1/13/2022  EXAMINATION: FL ESOPHAGRAM COMPLETE SINGLE-CONTRAST-  INDICATION: dysphagia, reflux; R00.1-Bradycardia, unspecified  TECHNIQUE: 24 seconds of fluoroscopic time was used for this exam. 8 associated fluoroscopic series were saved.  imaging reveals a nonobstructive bowel gas pattern. There is a suture line visible in the left upper quadrant.  COMPARISON: NONE  FINDINGS: Under fluoroscopic observation, the patient ingested thin barium. The oral phase of deglutition appeared normal. The esophageal mucosa appeared grossly normal. There was no evidence of a focal esophageal stricture. There was mild gastroesophageal reflux to the level of the thoracic inlet. Examination of the stomach demonstrated a small sized sliding-type hiatal hernia.       1. Mild gastroesophageal reflux to the level of the thoracic inlet 2. Small sized sliding-type hiatal hernia        US Gallbladder    Result Date: 1/12/2022  EXAMINATION: US GALLBLADDER-01/12/2022:  INDICATION: Recent gastric sleeve surgery; R00.1-Bradycardia, unspecified, gastric surgery.  TECHNIQUE: Sonographic imaging was obtained of the right upper quadrant in both the sagittal and transverse planes.  COMPARISON: NONE.  FINDINGS: The pancreas is incomplete in its visualization with no gross mass or abnormal fluid collection identified. The liver is homogeneous in appearance with no focal mass or abnormal fluid collection. The gallbladder reveals no evidence of wall thickening. Echogenic focus suggesting possibly a polyp or stone seen within the gallbladder measuring 5 mm. No evidence of blood flow within the echogenic structure. The common bile duct measures 4 mm. The right kidney is normal in size, configuration, and texture  measuring in length from pole to pole 12.4 cm. No solid cortical mass or renal cortical cysts seen within the right kidney. No hydronephrosis or nephrolithiasis.      Stone or polyp seen within the gallbladder with no wall thickening or biliary ductal dilatation. The remainder of the right upper quadrant ultrasound is unremarkable.  D:  01/12/2022 E:  01/12/2022         XR Chest 1 View    Result Date: 1/12/2022  EXAMINATION: XR CHEST 1 VW- 01/12/2022  INDICATION: Dysrhythmia triage protocol  COMPARISON: NONE  FINDINGS: Portable chest reveals cardiac and mediastinal silhouettes within normal limits. The lung fields are clear. No focal parenchymal opacification present. No pleural effusion or pneumothorax. Degenerative changes seen within the spine. Pulmonary vascularity is within normal limits.        No acute cardiopulmonary disease.  D:  01/12/2022 E:  01/12/2022         During this visit the following were done:  Labs Reviewed [x]    Labs Ordered []    Radiology Reports Reviewed [x]    Radiology Ordered []    EKG, echo, and/or stress test reviewed []    EEG results reviewed  []    EEG reviewed and interpreted per myself   []    Discussed case with neurointerventionalist or neuroradiologist []    Referring Provider Records Reviewed []    ER Records Reviewed []    Hospital Records Reviewed []    History Obtained From Family []    Radiological images view and Interpreted per myself [x]    Case Discussed with referring provider []     Decision to obtain and request outside records  []        Assessment and Plan     Protracted headache, favor refractory migraine, CT head negative to my eye, no symptoms of a CNS infection.   - Migraine cocktail including Decadron, Compazine and Benadryl ordered.   - Outpatient neurology F/U first available.    Call for questions, will see prn. Thanks,              Electronically signed by Rojelio Edwards MD on 1/13/2022 at 16:16 EST

## 2022-01-13 NOTE — CONSULTS
[facility] at [facility]   Consult Note    Referring Provider: Dr. Jay Perez    Reason for Consultation: Bradycardia    Patient Care Team:  Rigoberto Brand MD as PCP - General (Family Medicine)     Problem List:  1. Bradycardia  2. Hypertension  3. Dyslipidemia  4. Diabetes mellitus  5. Morbid obesity  6. Sleep apnea, on CPAP  7. Surgeries:  1. Gastric sleeve 12/6/21  2. Sinus surgery  3. Tonsillectomy  4. Hip surgery  5. Ear tubes        Allergies   Allergen Reactions   • Clarithromycin Hives   • Codeine Hives   • Fortaz [Ceftazidime] Hives   • Penicillins Hives   • Unasyn [Ampicillin-Sulbactam Sodium] Hives           Current Facility-Administered Medications:   •  acetaminophen (TYLENOL) tablet 650 mg, 650 mg, Oral, Q6H PRN, Kevin Perez MD, 650 mg at 01/13/22 0406  •  famotidine (PEPCID) tablet 20 mg, 20 mg, Oral, BID AC, Moira Montilla MD, 20 mg at 01/13/22 0837  •  hydrALAZINE (APRESOLINE) tablet 25 mg, 25 mg, Oral, Q6H PRN, Greta Morillo APRN  •  lisinopril (PRINIVIL,ZESTRIL) tablet 20 mg, 20 mg, Oral, Q24H, Greta Morillo APRN, 20 mg at 01/13/22 0837  •  melatonin tablet 5 mg, 5 mg, Oral, Nightly PRN, Kevin Perez MD, 5 mg at 01/12/22 2208  •  metFORMIN (GLUCOPHAGE) tablet 2,000 mg, 2,000 mg, Oral, Daily With Breakfast, Kevin Perez MD, 2,000 mg at 01/13/22 0837  •  niCARdipine (CARDENE) 25mg in 250mL NS infusion, 5-15 mg/hr, Intravenous, Titrated, Gage Charles PA, Held at 01/12/22 1016  •  ondansetron (ZOFRAN) injection 4 mg, 4 mg, Intravenous, Q6H PRN, Kevin Perez MD  •  ondansetron (ZOFRAN) injection 4 mg, 4 mg, Intravenous, Once, Kevin Perez MD  •  pantoprazole (PROTONIX) EC tablet 40 mg, 40 mg, Oral, BID AC, Greta Morillo APRN, 40 mg at 01/13/22 0837  •  rosuvastatin (CRESTOR) tablet 40 mg, 40 mg, Oral, Nightly, Kevin Perez MD, 40 mg at 01/12/22 2208  •  sertraline (ZOLOFT) tablet 100 mg, 100 mg,  Oral, Daily, Kevin Perez MD, 100 mg at 01/13/22 0837  •  sodium chloride 0.9 % flush 10 mL, 10 mL, Intravenous, PRN, Anthony Rinaldi MD, 10 mL at 01/12/22 2208    niCARdipine, 5-15 mg/hr, Last Rate: Stopped (01/12/22 1016)        Medications Prior to Admission   Medication Sig Dispense Refill Last Dose   • acetaminophen (TYLENOL) 325 MG tablet Take 2 tablets by mouth Every 4 (Four) Hours As Needed for Mild Pain  or Fever (Temperature greater than or equal to 37 C).      • benazepril (LOTENSIN) 20 MG tablet Take 20 mg by mouth Daily.   1/11/2022 at Unknown time   • lansoprazole (PREVACID) 30 MG capsule Take 30 mg by mouth Daily.   1/11/2022 at Unknown time   • metFORMIN (GLUCOPHAGE) 1000 MG tablet Take 2,000 mg by mouth Daily With Breakfast.   1/11/2022 at Unknown time   • rosuvastatin (CRESTOR) 40 MG tablet Take 1 tablet by mouth Every Night for 30 days. 30 tablet 2 1/11/2022 at Unknown time   • sertraline (ZOLOFT) 100 MG tablet Take 100 mg by mouth Daily.   1/11/2022 at Unknown time   • aspirin 81 MG chewable tablet Chew 81 mg Daily.      • Empagliflozin (Jardiance) 25 MG tablet Take 1 tablet by mouth Daily.      • fenofibrate micronized (LOFIBRA) 134 MG capsule Take 134 mg by mouth Every Morning Before Breakfast.      • metoprolol succinate XL (TOPROL-XL) 100 MG 24 hr tablet Take 100 mg by mouth Daily.      • Olmesartan-amLODIPine-HCTZ (Tribenzor) 40-5-25 MG tablet Take 1 tablet by mouth Daily.      • SITagliptin (JANUVIA) 100 MG tablet Take 100 mg by mouth Daily.            Subjective .   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  Patient has previous history of hypertension, diabetes.  He has been on previous beta-blockade therapy.  Heart rates prior to recent gastric sleeve surgery had always been within normal limits.  He underwent gastric sleeve on 12/6/2021.  Patient reports that roughly  about 2 weeks after his surgery he began to note some bradycardia.  Denies any loss of consciousness.  Complains of significant reflux and gas since his surgery.  Patient reports that he has not had any issues with ambulation.  Notes that whenever he goes from a lying to seated position he will have some dizziness.  Once he starts walking he does not have any issues.  His bradycardia persisted and he followed up with his primary care physician.  Roughly 5 days ago his metoprolol therapy was discontinued.  Since that time still had persistent bradycardia.  Patient reports that it was palpated as low as 28.  Per his report most EKG readings have shown bradycardia in the 40s.  Complains of some intermittent palpitations which are most consistent with possible PVCs.  Due to persistent bradycardia he presented to the emergency department for further evaluation.      Social History     Socioeconomic History   • Marital status:    Tobacco Use   • Smoking status: Former Smoker     Packs/day: 0.50     Types: Cigarettes     Quit date: 2021     Years since quittin.6   • Smokeless tobacco: Never Used   Substance and Sexual Activity   • Alcohol use: Not Currently   • Drug use: Never   • Sexual activity: Defer     Family History   Problem Relation Age of Onset   • Bradycardia Sister          Review of Systems:  Review of Systems   Constitutional: Positive for malaise/fatigue. Negative for fever.   HENT: Negative for nosebleeds.    Eyes: Negative for redness and visual disturbance.   Cardiovascular: Negative for orthopnea, palpitations and paroxysmal nocturnal dyspnea.   Respiratory: Positive for snoring. Negative for cough, sputum production and wheezing.    Hematologic/Lymphatic: Negative for bleeding problem.   Skin: Negative for flushing, itching and rash.   Musculoskeletal: Negative for falls, joint pain and muscle cramps.   Gastrointestinal: Positive for heartburn. Negative for abdominal pain, diarrhea, nausea  "and vomiting.   Genitourinary: Negative for hematuria.   Neurological: Positive for dizziness and headaches. Negative for excessive daytime sleepiness, tremors and weakness.   Psychiatric/Behavioral: Negative for substance abuse. The patient is not nervous/anxious.          Objective   Vitals:  /95 (BP Location: Left arm, Patient Position: Lying)   Pulse (!) 44   Temp 97.7 °F (36.5 °C) (Oral)   Resp 18   Ht 198.1 cm (78\")   Wt (!) 165 kg (364 lb)   SpO2 92%   BMI 42.06 kg/m²        Vitals reviewed.   Constitutional:       Appearance: Well-developed and not in distress.      Comments: Morbidly obese   Neck:      Vascular: No JVD.      Trachea: No tracheal deviation.   Pulmonary:      Effort: Pulmonary effort is normal.      Breath sounds: Normal breath sounds.   Cardiovascular:      Bradycardia present. Regular rhythm.   Pulses:     Intact distal pulses.   Edema:     Peripheral edema absent.   Abdominal:      General: Bowel sounds are normal.      Palpations: Abdomen is soft.      Tenderness: There is no abdominal tenderness.   Musculoskeletal:         General: No deformity. Skin:     General: Skin is warm and dry.   Neurological:      Mental Status: Alert and oriented to person, place, and time.              Results Review:  I reviewed the patient's new clinical results.  Results from last 7 days   Lab Units 01/12/22  0928   WBC 10*3/mm3 4.76   HEMOGLOBIN g/dL 12.7*   HEMATOCRIT % 39.1   PLATELETS 10*3/mm3 110*     Results from last 7 days   Lab Units 01/13/22  0400 01/12/22  0928 01/12/22  0928   SODIUM mmol/L 142   < > 145   POTASSIUM mmol/L 3.7   < > 3.8   CHLORIDE mmol/L 108*   < > 110*   CO2 mmol/L 25.0   < > 23.0   BUN mg/dL 17   < > 17   CREATININE mg/dL 0.93   < > 0.89   CALCIUM mg/dL 9.1   < > 9.0   BILIRUBIN mg/dL  --   --  0.6   ALK PHOS U/L  --   --  58   ALT (SGPT) U/L  --   --  24   AST (SGOT) U/L  --   --  20   GLUCOSE mg/dL 88   < > 91    < > = values in this interval not displayed. "     Results from last 7 days   Lab Units 01/13/22  0400   SODIUM mmol/L 142   POTASSIUM mmol/L 3.7   CHLORIDE mmol/L 108*   CO2 mmol/L 25.0   BUN mg/dL 17   CREATININE mg/dL 0.93   GLUCOSE mg/dL 88   CALCIUM mg/dL 9.1         Lab Results   Lab Value Date/Time    TROPONINT <0.010 01/12/2022 0928    TROPONINT <0.010 11/22/2020 1357    TROPONINT <0.010 11/22/2020 1149     Results from last 7 days   Lab Units 01/12/22  0928   TSH uIU/mL 1.760         Results from last 7 days   Lab Units 01/12/22  0928   PROBNP pg/mL 633.9*       Echo:  · Left ventricular ejection fraction appears to be 61 - 65%. Left ventricular systolic function is normal.  · The cardiac valves are structurally and functionally within normal limits.        Tele:  SB    EKG: SB      Assessment/Plan     1. Bradycardia. Heart rate staying predominantly in the 40s.  Beta-blocker discontinued roughly 5 days ago.  Recent abdominal surgery.  Normal thyroid function.  Appears minimally symptomatic.  Echo with structurally normal heart.  2. GERD, patient has been evaluated by bariatric surgery here.  Gallbladder ultrasound unremarkable.  3. Hypertension, currently elevated.  Consider addition of nifedipine XL for further management.  4. Sleep apnea, treated with CPAP.  5. Morbid obesity  6. Headaches, neurology has been consulted by attending physician.      Plan:    1. Currently no clear indication for permanent pacemaker.  2. Continue to monitor rhythm off of beta-blocker and allow patient to further recover from recent abdominal procedure..  Given that he is minimally symptomatic.  3. We will have patient ambulate in hallways and monitor heart rate to ensure chronotropic competence.  4. If adequate heart rate response with ambulation can discharge home from EP standpoint and follow-up in 2 months.   5. Discussed the case with Dr. Cho who is in agreement.      MELL Weaver       Dictated utilizing [place]  dictation  ________________________    Patient seen and examined.    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  Almost certainly this is vagally mediated as a result of his gastric procedure.  It is highly doubtful that he would suddenly develop organic sinus node dysfunction in his post gastric bypass perioperative period.    While his sister does have a pacemaker this was apparently subsequent to an ablation procedure gone awry.    I have no problem with him being discharged from the hospital today.    He can go back to work per usual recovery from gastric surgery.    I like to see him in 2 months time.  We will arrange for this in my office.  Likely I will have him exercise on a exercise modified protocol stress test to assess for chronotropic competency at that point.        Jose Ramon Cho, DO, FACC, RS  Cardiac Electrophysiologist  [facility] / [facility]

## 2022-01-13 NOTE — PROGRESS NOTES
"Cc: #1 \"ready to go home\"    I saw him around 3 PM.  He was sitting up in a chair dressed hoping to go home awaiting final cardiology recommendations.  A woman is in the room with him.  He says he has had follow-up with Dr. Ledezma's office since his surgery.  He says he has reflux he is currently being treated here with twice daily oral PPI.  I asked him if he would like his diet advanced but he says he is hoping to go home as above.  When I saw him his upper GI had been completed and I reviewed the films but the report was pending.  Since then the report returned showing mild reflux to the level of the thoracic inlet and a small sized sliding-type hiatal hernia.    Impression: Dysphagia and reflux status post uneventful sleeve gastrectomy 12/6/2022.  Upper GI shows a small hiatal hernia and reflux.  Ultrasound shows an echogenic focus suggesting possibly a polyp or stone within the gallbladder measuring 5 mm.    Plan: Continue PPI therapy.  Advance diet to preadmission diet as tolerated.  Follow-up with his bariatric surgeon after discharge.  We will see in the hospital again as needed.  Thank you    "

## 2022-01-13 NOTE — PROGRESS NOTES
[facility] at [facility]  IP Progress Note      Chief Complaint/Reason for service: #1 bradycardia    Subjective   Subjective: The patient is awake and alert sitting in a chair.  He again reiterates the main reason he came in because his heart rate was also low.  He is also felt some fatigue.  No syncope.  He complains of a severe headache this morning.  He states he has been having a lot of headaches and now has had a continuous headache for the last 1 week.    Past medical, surgical, social and family history reviewed in the patient's electronic medical record.    Objective     Vital Sign Min/Max for last 24 hours  Temp  Min: 97.6 °F (36.4 °C)  Max: 98.1 °F (36.7 °C)   BP  Min: 145/85  Max: 165/69   Pulse  Min: 38  Max: 70   Resp  Min: 18  Max: 18   SpO2  Min: 90 %  Max: 97 %   No data recorded    No intake or output data in the 24 hours ending 01/13/22 0709          Current Facility-Administered Medications:   •  acetaminophen (TYLENOL) tablet 650 mg, 650 mg, Oral, Q6H PRN, Kevin Perez MD, 650 mg at 01/13/22 0406  •  famotidine (PEPCID) tablet 20 mg, 20 mg, Oral, BID AC, Moira Montilla MD  •  hydrALAZINE (APRESOLINE) tablet 25 mg, 25 mg, Oral, Q6H PRN, Greta Morillo APRN  •  lisinopril (PRINIVIL,ZESTRIL) tablet 20 mg, 20 mg, Oral, Q24H, Greta Morillo APRN, 20 mg at 01/12/22 1648  •  melatonin tablet 5 mg, 5 mg, Oral, Nightly PRN, Kevin Perez MD, 5 mg at 01/12/22 2208  •  metFORMIN (GLUCOPHAGE) tablet 2,000 mg, 2,000 mg, Oral, Daily With Breakfast, Kevin Perez MD  •  niCARdipine (CARDENE) 25mg in 250mL NS infusion, 5-15 mg/hr, Intravenous, Titrated, Gage Charles PA, Held at 01/12/22 1016  •  ondansetron (ZOFRAN) injection 4 mg, 4 mg, Intravenous, Q6H PRN, Kevin Perez MD  •  ondansetron (ZOFRAN) injection 4 mg, 4 mg, Intravenous, Once, Kevin Perez MD  •  pantoprazole (PROTONIX) EC tablet 40 mg, 40 mg, Oral, BID AC,  Greta Morillo, APRN, 40 mg at 01/12/22 1648  •  rosuvastatin (CRESTOR) tablet 40 mg, 40 mg, Oral, Nightly, Kevin Perez MD, 40 mg at 01/12/22 2208  •  sertraline (ZOLOFT) tablet 100 mg, 100 mg, Oral, Daily, Kevin Perez MD, 100 mg at 01/12/22 1648  •  sodium chloride 0.9 % flush 10 mL, 10 mL, Intravenous, PRN, Anthony Rinaldi MD, 10 mL at 01/12/22 2208    Physical Exam: General morbidly obese male sitting in his recliner not dyspneic tachypneic current heart rate 38        HEENT: No JVP       Respiratory: Clear bilaterally       Cardiovascular: Bradycardic without murmur       GI: Obese       Lower Extremities: No lesions       Neuro: Facial expressions are symmetrical       Skin: Warm and dry       Psych: Pleasant affect    Results Review: I reviewed the note of Dr. Montilla.  Heart rate is 39-44.  Blood pressure is improved from yesterday.  BNP was mildly elevated at 633.  GFR is 90    Radiology Results:  Imaging Results (Last 72 Hours)     Procedure Component Value Units Date/Time    US Gallbladder [[id number]] Collected: 01/12/22 1359     Updated: 01/12/22 1412    Narrative:      EXAMINATION: US GALLBLADDER-01/12/2022:     INDICATION: Recent gastric sleeve surgery; R00.1-Bradycardia,  unspecified, gastric surgery.     TECHNIQUE: Sonographic imaging was obtained of the right upper quadrant  in both the sagittal and transverse planes.     COMPARISON: NONE.     FINDINGS: The pancreas is incomplete in its visualization with no gross  mass or abnormal fluid collection identified. The liver is homogeneous  in appearance with no focal mass or abnormal fluid collection. The  gallbladder reveals no evidence of wall thickening. Echogenic focus  suggesting possibly a polyp or stone seen within the gallbladder  measuring 5 mm. No evidence of blood flow within the echogenic  structure. The common bile duct measures 4 mm. The right kidney is  normal in size, configuration, and texture measuring in length  from pole  to pole 12.4 cm. No solid cortical mass or renal cortical cysts seen  within the right kidney. No hydronephrosis or nephrolithiasis.       Impression:      Stone or polyp seen within the gallbladder with no wall  thickening or biliary ductal dilatation. The remainder of the right  upper quadrant ultrasound is unremarkable.     D:  01/12/2022  E:  01/12/2022              XR Chest 1 View [[id number]] Collected: 01/12/22 1009     Updated: 01/12/22 1011    Narrative:      EXAMINATION: XR CHEST 1 VW- 01/12/2022     INDICATION: Dysrhythmia triage protocol      COMPARISON: NONE     FINDINGS: Portable chest reveals cardiac and mediastinal silhouettes  within normal limits. The lung fields are clear. No focal parenchymal  opacification present. No pleural effusion or pneumothorax. Degenerative  changes seen within the spine. Pulmonary vascularity is within normal  limits.          Impression:      No acute cardiopulmonary disease.     D:  01/12/2022  E:  01/12/2022                EKG: Sinus bradycardia    ECHO: Preserved EF 6165%    Tele: Sinus bradycardia with heart rate 35-49    Assessment   Assessment/Plan: 1 bradycardia-the patient is not on any AV sawyer suppressant drugs.  He did come in with some lightheadedness but also some vertiginous symptoms.  We will consult the EP service.  He has a sister who has a pacemaker.  We will make him n.p.o. in case pacemaker need to be placed today  2 chronic headaches-awaiting cervical x-rays.  I will order him something for a severe headache.  Will consult neurology  3 gallbladder disease-not acute  4 severe reflux-PPIs have been increased    Kevin Perez MD  01/13/22  07:09 EST

## 2022-01-13 NOTE — TELEPHONE ENCOUNTER
----- Message from Jose Ramon Cho DO sent at 1/13/2022  3:02 PM EST -----  Regarding: office FU visit  Can you get this patient an appointment to see me in 2 months time.  I would like for him also to have an exercise stress test modified Jose David protocol as an outpatient a week before I see him in the office please.

## 2022-01-13 NOTE — CONSULTS
BARIATRIC SURGERY CONSULTATION      Patient Care Team:  Rigoberto Brand MD as PCP - General (Family Medicine)      Chief complaint: Reflux, dysphagia    Subjective     History of Present Illness    Dom Russell is a 41 y.o. year old male who is status post 2022 laparoscopic sleeve gastrectomy at Left [facility] by Dr. Ledezma.  He has had to have several adjustments to his blood pressure medicine, including his beta-blocker, due to dizzy spells and bradycardia.  He was admitted to the hospital today for symptomatic bradycardia is undergoing work-up with cardiology service.    Bariatric surgery service is consulted to evaluate dysphagia and reflux.  He takes Prevacid 30 mg daily but has persistent reflux that is sometimes severe.  His reflux predates bariatric surgery.  He has intermittent dysphagia.  He is overall able to meet protein and liquid goals.    Denies use of aspirin, NSAIDs, steroids, or tobacco/nicotine products.  His wife smokes outside only.    Review of Systems   Constitutional: Negative for fatigue and fever.   Cardiovascular:        Bradycardia and lightheadedness   Gastrointestinal: Negative for nausea and vomiting.        Reflux and dysphagia   All other systems reviewed and are negative.       Past Medical History:   Diagnosis Date   • Diabetes mellitus (HCC)    • Hyperlipidemia      Past Surgical History:   Procedure Laterality Date   • EAR TUBES     • GASTRIC SLEEVE LAPAROSCOPIC     • HIP SURGERY     • SINUS SURGERY     • TONSILLECTOMY       History reviewed. No pertinent family history.  Social History     Tobacco Use   • Smoking status: Former Smoker     Packs/day: 0.50     Types: Cigarettes     Quit date: 2021     Years since quittin.6   • Smokeless tobacco: Never Used   Substance Use Topics   • Alcohol use: Not Currently   • Drug use: Never     E-cigarette/Vaping     E-cigarette/Vaping Substances     Medications Prior to Admission   Medication Sig Dispense Refill Last Dose    • acetaminophen (TYLENOL) 325 MG tablet Take 2 tablets by mouth Every 4 (Four) Hours As Needed for Mild Pain  or Fever (Temperature greater than or equal to 37 C).      • benazepril (LOTENSIN) 20 MG tablet Take 20 mg by mouth Daily.   1/11/2022 at Unknown time   • lansoprazole (PREVACID) 30 MG capsule Take 30 mg by mouth Daily.   1/11/2022 at Unknown time   • metFORMIN (GLUCOPHAGE) 1000 MG tablet Take 2,000 mg by mouth Daily With Breakfast.   1/11/2022 at Unknown time   • rosuvastatin (CRESTOR) 40 MG tablet Take 1 tablet by mouth Every Night for 30 days. 30 tablet 2 1/11/2022 at Unknown time   • sertraline (ZOLOFT) 100 MG tablet Take 100 mg by mouth Daily.   1/11/2022 at Unknown time   • aspirin 81 MG chewable tablet Chew 81 mg Daily.      • Empagliflozin (Jardiance) 25 MG tablet Take 1 tablet by mouth Daily.      • fenofibrate micronized (LOFIBRA) 134 MG capsule Take 134 mg by mouth Every Morning Before Breakfast.      • metoprolol succinate XL (TOPROL-XL) 100 MG 24 hr tablet Take 100 mg by mouth Daily.      • Olmesartan-amLODIPine-HCTZ (Tribenzor) 40-5-25 MG tablet Take 1 tablet by mouth Daily.      • SITagliptin (JANUVIA) 100 MG tablet Take 100 mg by mouth Daily.        Allergies:  Clarithromycin, Codeine, Fortaz [ceftazidime], Penicillins, and Unasyn [ampicillin-sulbactam sodium]    Objective      Vital Signs  Temp:  [97.6 °F (36.4 °C)-98.1 °F (36.7 °C)] 97.6 °F (36.4 °C)  Heart Rate:  [42-70] 70  Resp:  [18] 18  BP: (159-165)/(54-85) 164/79    Physical Exam  Constitutional:       General: He is not in acute distress.     Appearance: He is well-developed. He is not diaphoretic.   HENT:      Head: Normocephalic and atraumatic.      Mouth/Throat:      Pharynx: No oropharyngeal exudate.   Eyes:      Conjunctiva/sclera: Conjunctivae normal.      Pupils: Pupils are equal, round, and reactive to light.   Cardiovascular:      Rate and Rhythm: Bradycardia present.   Pulmonary:      Effort: Pulmonary effort is normal.  No respiratory distress.   Abdominal:      General: There is no distension.      Palpations: Abdomen is soft.      Comments: Lap scars are well-healed.   Skin:     General: Skin is warm and dry.      Coloration: Skin is not pale.   Neurological:      Mental Status: He is alert and oriented to person, place, and time.      Cranial Nerves: No cranial nerve deficit.   Psychiatric:         Behavior: Behavior normal.         Thought Content: Thought content normal.         Results Review:   I reviewed the patient's new clinical results.  I reviewed the patient's new imaging results and agree with the interpretation.    Lab Results (last 24 hours)     Procedure Component Value Units Date/Time    COVID PRE-OP / PRE-PROCEDURE SCREENING ORDER (NO ISOLATION) - Swab, Nasopharynx [[id number]]  (Normal) Collected: 01/12/22 1527    Specimen: Swab from Nasopharynx Updated: 01/12/22 1615    Narrative:      The following orders were created for panel order COVID PRE-OP / PRE-PROCEDURE SCREENING ORDER (NO ISOLATION) - Swab, Nasopharynx.  Procedure                               Abnormality         Status                     ---------                               -----------         ------                     COVID-19, FLU A/B, RSV P...[[id number]]  Normal              Final result                 Please view results for these tests on the individual orders.    COVID-19, FLU A/B, RSV PCR - Swab, Nasopharynx [[id number]]  (Normal) Collected: 01/12/22 1527    Specimen: Swab from Nasopharynx Updated: 01/12/22 1615     COVID19 Not Detected     Influenza A PCR Not Detected     Influenza B PCR Not Detected     RSV, PCR Not Detected    [place] Draw [[id number]] Collected: 01/12/22 0928    Specimen: Blood Updated: 01/12/22 1330    Narrative:      The following orders were created for panel order [place] Draw.  Procedure                               Abnormality         Status                     ---------                               -----------          ------                     Green Top (Gel)[[id number]]                                  Final result               Lavender Top[[id number]]                                     Final result               Gold Top - SST[[id number]]                                   Final result               Beal Top[[id number]]                                         Final result               Light Blue Top[[id number]]                                   Final result                 Please view results for these tests on the individual orders.    Beal Top [[id number]] Collected: 01/12/22 0928    Specimen: Blood Updated: 01/12/22 1330     Extra Tube Hold for add-ons.     Comment: Auto resulted.       Light Blue Top [[id number]] Collected: 01/12/22 0928    Specimen: Blood Updated: 01/12/22 1030     Extra Tube hold for add-on     Comment: Auto resulted       Lavender Top [[id number]] Collected: 01/12/22 0928    Specimen: Blood Updated: 01/12/22 1030     Extra Tube hold for add-on     Comment: Auto resulted       Gold Top - SST [[id number]] Collected: 01/12/22 0928    Specimen: Blood Updated: 01/12/22 1030     Extra Tube Hold for add-ons.     Comment: Auto resulted.       Green Top (Gel) [[id number]] Collected: 01/12/22 0928    Specimen: Blood Updated: 01/12/22 1030     Extra Tube Hold for add-ons.     Comment: Auto resulted.       TSH [[id number]]  (Normal) Collected: 01/12/22 0928    Specimen: Blood Updated: 01/12/22 1024     TSH 1.760 uIU/mL     BNP [[id number]]  (Abnormal) Collected: 01/12/22 0928    Specimen: Blood Updated: 01/12/22 1024     proBNP 633.9 pg/mL     Narrative:      Among patients with dyspnea, NT-proBNP is highly sensitive for the detection of acute congestive heart failure. In addition NT-proBNP of <300 pg/ml effectively rules out acute congestive heart failure with 99% negative predictive value.    Results may be falsely decreased if patient taking Biotin.      Troponin [[id number]]  (Normal) Collected: 01/12/22 0928     Specimen: Blood Updated: 01/12/22 1024     Troponin T <0.010 ng/mL     Narrative:      Troponin T Reference Range:  <= 0.03 ng/mL-   Negative for AMI  >0.03 ng/mL-     Abnormal for myocardial necrosis.  Clinicians would have to utilize clinical acumen, EKG, Troponin and serial changes to determine if it is an Acute Myocardial Infarction or myocardial injury due to an underlying chronic condition.       Results may be falsely decreased if patient taking Biotin.      Comprehensive Metabolic Panel [[id number]]  (Abnormal) Collected: 01/12/22 0928    Specimen: Blood Updated: 01/12/22 1007     Glucose 91 mg/dL      BUN 17 mg/dL      Creatinine 0.89 mg/dL      Sodium 145 mmol/L      Potassium 3.8 mmol/L      Chloride 110 mmol/L      CO2 23.0 mmol/L      Calcium 9.0 mg/dL      Total Protein 6.3 g/dL      Albumin 4.20 g/dL      ALT (SGPT) 24 U/L      AST (SGOT) 20 U/L      Alkaline Phosphatase 58 U/L      Total Bilirubin 0.6 mg/dL      eGFR Non African Amer 95 mL/min/1.73      Globulin 2.1 gm/dL      Comment: Calculated Result        A/G Ratio 2.0 g/dL      BUN/Creatinine Ratio 19.1     Anion Gap 12.0 mmol/L     Narrative:      GFR Normal >60  Chronic Kidney Disease <60  Kidney Failure <15      Magnesium [[id number]]  (Normal) Collected: 01/12/22 0928    Specimen: Blood Updated: 01/12/22 1007     Magnesium 2.1 mg/dL     CBC & Differential [[id number]]  (Abnormal) Collected: 01/12/22 0928    Specimen: Blood Updated: 01/12/22 0952    Narrative:      The following orders were created for panel order CBC & Differential.  Procedure                               Abnormality         Status                     ---------                               -----------         ------                     CBC Auto Differential[[id number]]        Abnormal            Final result               Scan Slide[[id number]]                                                                    Please view results for these tests on the individual orders.     CBC Auto Differential [[id number]]  (Abnormal) Collected: 01/12/22 0928    Specimen: Blood Updated: 01/12/22 0952     WBC 4.76 10*3/mm3      RBC 4.53 10*6/mm3      Hemoglobin 12.7 g/dL      Hematocrit 39.1 %      MCV 86.3 fL      MCH 28.0 pg      MCHC 32.5 g/dL      RDW 17.1 %      RDW-SD 52.7 fl      MPV 11.7 fL      Platelets 110 10*3/mm3      Neutrophil % 62.2 %      Lymphocyte % 26.5 %      Monocyte % 6.1 %      Eosinophil % 4.4 %      Basophil % 0.6 %      Immature Grans % 0.2 %      Neutrophils, Absolute 2.96 10*3/mm3      Lymphocytes, Absolute 1.26 10*3/mm3      Monocytes, Absolute 0.29 10*3/mm3      Eosinophils, Absolute 0.21 10*3/mm3      Basophils, Absolute 0.03 10*3/mm3      Immature Grans, Absolute 0.01 10*3/mm3      nRBC 0.0 /100 WBC           Imaging Results (Last 24 Hours)     Procedure Component Value Units Date/Time    US Gallbladder [[id number]] Collected: 01/12/22 1359     Updated: 01/12/22 1412    Narrative:      EXAMINATION: US GALLBLADDER-01/12/2022:     INDICATION: Recent gastric sleeve surgery; R00.1-Bradycardia,  unspecified, gastric surgery.     TECHNIQUE: Sonographic imaging was obtained of the right upper quadrant  in both the sagittal and transverse planes.     COMPARISON: NONE.     FINDINGS: The pancreas is incomplete in its visualization with no gross  mass or abnormal fluid collection identified. The liver is homogeneous  in appearance with no focal mass or abnormal fluid collection. The  gallbladder reveals no evidence of wall thickening. Echogenic focus  suggesting possibly a polyp or stone seen within the gallbladder  measuring 5 mm. No evidence of blood flow within the echogenic  structure. The common bile duct measures 4 mm. The right kidney is  normal in size, configuration, and texture measuring in length from pole  to pole 12.4 cm. No solid cortical mass or renal cortical cysts seen  within the right kidney. No hydronephrosis or nephrolithiasis.       Impression:      Stone or  polyp seen within the gallbladder with no wall  thickening or biliary ductal dilatation. The remainder of the right  upper quadrant ultrasound is unremarkable.     D:  01/12/2022  E:  01/12/2022              XR Chest 1 View [[id number]] Collected: 01/12/22 1009     Updated: 01/12/22 1011    Narrative:      EXAMINATION: XR CHEST 1 VW- 01/12/2022     INDICATION: Dysrhythmia triage protocol      COMPARISON: NONE     FINDINGS: Portable chest reveals cardiac and mediastinal silhouettes  within normal limits. The lung fields are clear. No focal parenchymal  opacification present. No pleural effusion or pneumothorax. Degenerative  changes seen within the spine. Pulmonary vascularity is within normal  limits.          Impression:      No acute cardiopulmonary disease.     D:  01/12/2022  E:  01/12/2022                  Assessment/Plan       Bradycardia      Assessment & Plan    41-year-old male who is approximately 1 month status post sleeve gastrectomy in [place].    Admitted for bradycardia.    Also has poorly controlled reflux and dysphagia.    Will obtain upper GI x-ray to rule out technical issues causing dysphagia/reflux.    Ultrasound showing gallbladder polyp versus cholelithiasis noted.  At this point, he does not have nausea or right upper quadrant pain, and symptoms seem to be more related to reflux.    He really is doing well with protein and liquid intake at home.  He need to follow-up with Dr. Ledezma as an outpatient if these issues persist. Continue Protonix 40 mg bid, and add Pepcid 20 mg bid.     Restart stage III bariatric diet with 3 times daily Premier protein shakes.    I discussed the patients findings and my recommendations with patient    Thank you, Dr. Perez, for the opportunity to evaluate this patient.    Moira Montilla MD  01/12/22  22:13 EST

## 2022-01-13 NOTE — DISCHARGE SUMMARY
Cardiology Discharge Note    Patient Name:  Dom Russell  Date of Admission:  1/12/2022  Date of Discharge:  1/13/2022    Discharge Diagnosis: 1 bradycardia #2 reflux symptoms #3 headache #4 gallbladder disease    Problem List:    Bradycardia      Presenting Problem/History of Present Illness:  Bradycardia [R00.1]    Patient felt a little lightheaded and checked his heart rate and it was 40    Hospital Course:  Patient is a 41 y.o. male presented with bradycardia.  His echocardiogram was normal.  He was seen by EP service who felt like the bradycardia was secondary to high vagal tone following his gastric bypass.  The patient has not been presyncopal.  He also complained of a headache for 7 days.  He had significant cracking when he turned his neck to the right.  X-ray showed some mild arthritis.  He was also having a lot of bloating after meals and he had an ultrasound of the gallbladder which showed a gallstone but no acute cholecystitis.  He also had a esophagram and the results are pending.  The patient really wanted to go home today.  His Protonix was increased to twice daily as Pepcid was also added as recommendation from the bariatric surgical team.  Seen in consultation by EP service who felt pacemaker is not warranted they want to see him in 2 months.        Physical Exam:  Temp:  [97.4 °F (36.3 °C)-97.7 °F (36.5 °C)] 97.4 °F (36.3 °C)  Heart Rate:  [36-76] 38  Resp:  [18] 18  BP: (145-167)/(79-95) 167/95    Neck: No JVP.  Mild reduction of motion of the head with cracking sound to the right    Cardiovascular: Bradycardic without murmur        Respiratory: Clear bilaterally    Neurologic: No focal deficits      Procedures Performed: #1 echo #2 esophagram #3 CT of the head #4 gallbladder ultrasound      Consults:   Consults     Date and Time Order Name Status Description    1/13/2022  7:23 AM Inpatient Neurology Consult Other (see comments)      1/12/2022 12:41 PM Inpatient Bariatric Surgery Consult  Completed             Condition on Discharge: Stable      Discharge Disposition: Home    Discharge Medications:     Discharge Medications      New Medications      Instructions Start Date   famotidine 20 MG tablet  Commonly known as: PEPCID   20 mg, Oral, 2 Times Daily Before Meals      pantoprazole 40 MG EC tablet  Commonly known as: PROTONIX  Replaces: lansoprazole 30 MG capsule   40 mg, Oral, 2 Times Daily Before Meals         Continue These Medications      Instructions Start Date   acetaminophen 325 MG tablet  Commonly known as: TYLENOL   650 mg, Oral, Every 4 Hours PRN      aspirin 81 MG chewable tablet   81 mg, Oral, Daily      benazepril 20 MG tablet  Commonly known as: LOTENSIN   20 mg, Oral, Daily      fenofibrate micronized 134 MG capsule  Commonly known as: LOFIBRA   134 mg, Oral, Every Morning Before Breakfast      Jardiance 25 MG tablet tablet  Generic drug: empagliflozin   1 tablet, Oral, Daily      metFORMIN 1000 MG tablet  Commonly known as: GLUCOPHAGE   2,000 mg, Oral, Daily With Breakfast      rosuvastatin 40 MG tablet  Commonly known as: CRESTOR   40 mg, Oral, Nightly      sertraline 100 MG tablet  Commonly known as: ZOLOFT   100 mg, Oral, Daily      SITagliptin 100 MG tablet  Commonly known as: JANUVIA   100 mg, Oral, Daily         Stop These Medications    lansoprazole 30 MG capsule  Commonly known as: PREVACID  Replaced by: pantoprazole 40 MG EC tablet     metoprolol succinate  MG 24 hr tablet  Commonly known as: TOPROL-XL     Tribenzor 40-5-25 MG tablet  Generic drug: Olmesartan-amLODIPine-HCTZ            Discharge Diet: Per bariatric surgery guidelines    Activity at Discharge: No restriction    Follow-up Appointments: Bariatric surgery 1 to 2 weeks  Patient is to call my office next Wednesday to go over his test results      Time: 20 minutes    Kevin Perez MD,  1/13/2022 - 15:49 EST

## 2022-03-08 ENCOUNTER — HOSPITAL ENCOUNTER (OUTPATIENT)
Dept: CARDIOLOGY | Facility: HOSPITAL | Age: 42
Discharge: HOME | End: 2022-03-08
Admitting: INTERNAL MEDICINE

## 2022-03-08 ENCOUNTER — HOSPITAL ENCOUNTER (OUTPATIENT)
Dept: CARDIOLOGY | Facility: HOSPITAL | Age: 42
End: 2022-03-08

## 2022-03-08 VITALS
HEART RATE: 90 BPM | DIASTOLIC BLOOD PRESSURE: 70 MMHG | WEIGHT: 315 LBS | BODY MASS INDEX: 36.45 KG/M2 | HEIGHT: 78 IN | SYSTOLIC BLOOD PRESSURE: 140 MMHG

## 2022-03-08 DIAGNOSIS — R00.1 BRADYCARDIA: ICD-10-CM

## 2022-03-08 LAB
BH CV STRESS BP STAGE 1: NORMAL
BH CV STRESS BP STAGE 2: NORMAL
BH CV STRESS BP STAGE 3: NORMAL
BH CV STRESS BP STAGE 4: NORMAL
BH CV STRESS DURATION MIN STAGE 1: 3
BH CV STRESS DURATION MIN STAGE 2: 3
BH CV STRESS DURATION MIN STAGE 3: 3
BH CV STRESS DURATION MIN STAGE 4: 2
BH CV STRESS DURATION SEC STAGE 1: 0
BH CV STRESS DURATION SEC STAGE 2: 0
BH CV STRESS DURATION SEC STAGE 3: 0
BH CV STRESS DURATION SEC STAGE 4: 59
BH CV STRESS GRADE STAGE 1: 0
BH CV STRESS GRADE STAGE 2: 5
BH CV STRESS GRADE STAGE 3: 10
BH CV STRESS GRADE STAGE 4: 12
BH CV STRESS HR STAGE 1: 111
BH CV STRESS HR STAGE 2: 120
BH CV STRESS HR STAGE 3: 139
BH CV STRESS HR STAGE 4: 148
BH CV STRESS METS STAGE 1: 2.3
BH CV STRESS METS STAGE 2: 3.5
BH CV STRESS METS STAGE 3: 4.6
BH CV STRESS METS STAGE 4: 7
BH CV STRESS O2 STAGE 1: 95
BH CV STRESS O2 STAGE 2: 94
BH CV STRESS O2 STAGE 3: 95
BH CV STRESS O2 STAGE 4: 94
BH CV STRESS PROTOCOL 1: NORMAL
BH CV STRESS RECOVERY BP: NORMAL MMHG
BH CV STRESS RECOVERY HR: 95 BPM
BH CV STRESS RECOVERY O2: 95 %
BH CV STRESS SPEED STAGE 1: 1.7
BH CV STRESS SPEED STAGE 2: 1.7
BH CV STRESS SPEED STAGE 3: 1.7
BH CV STRESS SPEED STAGE 4: 1.7
BH CV STRESS STAGE 1: 1
BH CV STRESS STAGE 2: 2
BH CV STRESS STAGE 3: 3
BH CV STRESS STAGE 4: 4
MAXIMAL PREDICTED HEART RATE: 181 BPM
PERCENT MAX PREDICTED HR: 83.43 %
STRESS BASELINE BP: NORMAL MMHG
STRESS BASELINE HR: 90 BPM
STRESS O2 SAT REST: 95 %
STRESS PERCENT HR: 98 %
STRESS POST ESTIMATED WORKLOAD: 7 METS
STRESS POST EXERCISE DUR MIN: 11 MIN
STRESS POST EXERCISE DUR SEC: 59 SEC
STRESS POST O2 SAT PEAK: 94 %
STRESS POST PEAK BP: NORMAL MMHG
STRESS POST PEAK HR: 151 BPM
STRESS TARGET HR: 154 BPM

## 2022-03-08 PROCEDURE — 93017 CV STRESS TEST TRACING ONLY: CPT

## 2022-03-08 PROCEDURE — 93018 CV STRESS TEST I&R ONLY: CPT | Performed by: INTERNAL MEDICINE

## 2022-03-22 ENCOUNTER — OFFICE VISIT (OUTPATIENT)
Dept: NEUROLOGY | Facility: CLINIC | Age: 42
End: 2022-03-22

## 2022-03-22 VITALS
DIASTOLIC BLOOD PRESSURE: 84 MMHG | HEART RATE: 86 BPM | SYSTOLIC BLOOD PRESSURE: 140 MMHG | TEMPERATURE: 97.1 F | WEIGHT: 315 LBS | BODY MASS INDEX: 36.45 KG/M2 | OXYGEN SATURATION: 98 % | HEIGHT: 78 IN

## 2022-03-22 DIAGNOSIS — G44.89 HEADACHE SYNDROME: Primary | ICD-10-CM

## 2022-03-22 DIAGNOSIS — R00.1 BRADYCARDIA: ICD-10-CM

## 2022-03-22 DIAGNOSIS — I10 PRIMARY HYPERTENSION: ICD-10-CM

## 2022-03-22 DIAGNOSIS — E78.5 HYPERLIPIDEMIA, UNSPECIFIED HYPERLIPIDEMIA TYPE: ICD-10-CM

## 2022-03-22 PROBLEM — K21.9 CHRONIC GERD: Status: ACTIVE | Noted: 2022-03-22

## 2022-03-22 PROBLEM — F41.9 ANXIETY AND DEPRESSION: Status: ACTIVE | Noted: 2022-03-22

## 2022-03-22 PROBLEM — F32.A ANXIETY AND DEPRESSION: Status: ACTIVE | Noted: 2022-03-22

## 2022-03-22 PROCEDURE — 99214 OFFICE O/P EST MOD 30 MIN: CPT | Performed by: NURSE PRACTITIONER

## 2022-03-22 RX ORDER — LANSOPRAZOLE 30 MG/1
CAPSULE, DELAYED RELEASE ORAL
COMMUNITY
End: 2022-11-30

## 2022-03-22 RX ORDER — AMLODIPINE BESYLATE 10 MG/1
10 TABLET ORAL DAILY
COMMUNITY
Start: 2022-01-19

## 2022-03-22 RX ORDER — SERTRALINE HYDROCHLORIDE 100 MG/1
200 TABLET, FILM COATED ORAL DAILY
COMMUNITY
End: 2023-01-11 | Stop reason: SDUPTHER

## 2022-03-22 RX ORDER — METOPROLOL SUCCINATE 50 MG/1
50 TABLET, EXTENDED RELEASE ORAL DAILY
COMMUNITY
Start: 2022-03-07

## 2022-03-22 NOTE — PROGRESS NOTES
Headache New Office Visit      Encounter Date: 2022   Patient Name: Dom Russell  : 1980   MRN: [id number]   PCP: Dr Brand  Chief Complaint:    Chief Complaint   Patient presents with   • Headache       History of Present Illness: Dom Russell is a 41 y.o. male who is here today for evaluation of headaches.     Bradycardia  Admitted [facility] 22 Discharged 22- with asymptomatic bradycardia-eval w cardiology felt to be secondary to high vagal tone from gastric bypass. Echo-nml   Adult Transthoracic Echo Complete W/ Cont if Necessary Per Protocol (2022 14:55)    He also developed a headache for 7 days. CTH-neg   CT Head Without Contrast (2022 15:19).  Incidental gallstone found. He is now asymptomatic and is aware of need to follow up. On PPI.    HA   He has been HA free for 3 weeks. Previous HA was frontal and throbbing in nature with each heartbeat. Intermittent photophobia. Denied visual changes, nausea, vomiting, numbness and presyncope. No fever or neck pain. Treated with tylenol and toradol    Has not had a severe HA for 3 weeks. Taking metoprolol for BP after bradycardia resolved.    Has a history of HA with brain MRI .             Currently denies visual changes, slurred speech, dysphagia, paresthesia, weakness, falls, changes with bladder or bowels. No confusion.    Works nightshift as  and has 2 children under 5 years old. Recent gastric bypass with 60 pounds of weight loss. Having trouble getting in all of his protein. Has follow up with bariatric surgeon.    Diabetes well controlled and coming off all diabetic medications. Continues to take Crestor.    Anxiety  Taking sertraline for irritability. He doesn't feel like it is helping. Will follow up with PCP                                                            PMH dm-5.2, then, tia, gastric bypass, bradycardia, dizziness, HA, BERNARD, tob useHLD, BERNARD w BiPAP.  Subjective      Past Medical History:    Past Medical History:   Diagnosis Date   • Anxiety and depression    • Chronic GERD    • Diabetes mellitus (HCC)    • Hyperlipidemia    • Primary hypertension        Past Surgical History:   Past Surgical History:   Procedure Laterality Date   • EAR TUBES     • GASTRIC SLEEVE LAPAROSCOPIC     • HIP SURGERY     • SINUS SURGERY     • TONSILLECTOMY         Family History:   Family History   Problem Relation Age of Onset   • Bradycardia Sister        Social History:   Social History     Socioeconomic History   • Marital status:    Tobacco Use   • Smoking status: Former Smoker     Packs/day: 0.50     Types: Cigarettes     Quit date: 2021     Years since quittin.8   • Smokeless tobacco: Never Used   Substance and Sexual Activity   • Alcohol use: Not Currently   • Drug use: Never   • Sexual activity: Defer       Medications:     Current Outpatient Medications:   •  acetaminophen (TYLENOL) 325 MG tablet, Take 2 tablets by mouth Every 4 (Four) Hours As Needed for Mild Pain  or Fever (Temperature greater than or equal to 37 C)., Disp:  , Rfl:   •  amLODIPine (NORVASC) 10 MG tablet, Take 10 mg by mouth Daily., Disp: , Rfl:   •  benazepril (LOTENSIN) 20 MG tablet, Take 20 mg by mouth Daily., Disp: , Rfl:   •  famotidine (PEPCID) 20 MG tablet, Take 1 tablet by mouth 2 (Two) Times a Day Before Meals., Disp: 60 tablet, Rfl: 3  •  lansoprazole (PREVACID) 30 MG capsule, lansoprazole 30 mg capsule,delayed release  Take 1 capsule every day by oral route., Disp: , Rfl:   •  metoprolol succinate XL (TOPROL-XL) 50 MG 24 hr tablet, Take 50 mg by mouth Daily., Disp: , Rfl:   •  pantoprazole (PROTONIX) 40 MG EC tablet, Take 1 tablet by mouth 2 (Two) Times a Day Before Meals., Disp: 60 tablet, Rfl: 3  •  rosuvastatin (CRESTOR) 40 MG tablet, Take 1 tablet by mouth Every Night for 30 days., Disp: 30 tablet, Rfl: 2  •  sertraline (ZOLOFT) 100 MG tablet, Take 200 mg by mouth Daily., Disp: , Rfl:     Allergies:   Allergies    Allergen Reactions   • Clarithromycin Hives and Anaphylaxis   • Ceftazidime Hives   • Codeine Hives   • Penicillins Hives   • Unasyn [Ampicillin-Sulbactam Sodium] Hives       PHQ-9 Total Score:     NANNETTE Fall Risk Assessment has not been completed.    Objective     Physical Exam:   Physical Exam  Eyes:      Pupils: Pupils are equal, round, and reactive to light.   Neurological:      Mental Status: He is oriented to person, place, and time.      Coordination: Finger-Nose-Finger Test, Heel to Shin Test and Romberg Test normal.      Gait: Gait is intact.      Deep Tendon Reflexes:      Reflex Scores:       Tricep reflexes are 2+ on the right side and 2+ on the left side.       Bicep reflexes are 2+ on the right side and 2+ on the left side.       Brachioradialis reflexes are 2+ on the right side and 2+ on the left side.       Patellar reflexes are 2+ on the right side and 2+ on the left side.       Achilles reflexes are 2+ on the right side and 2+ on the left side.  Psychiatric:         Speech: Speech normal.         Neurologic Exam     Mental Status   Oriented to person, place, and time.   Follows 3 step commands.   Attention: normal. Concentration: normal.   Speech: speech is normal   Level of consciousness: alert  Knowledge: consistent with education.   Normal comprehension.     Cranial Nerves     CN III, IV, VI   Pupils are equal, round, and reactive to light.  Right pupil: Accommodation: intact.   Left pupil: Accommodation: intact.   CN III: no CN III palsy  CN VI: no CN VI palsy  Nystagmus: none   Diplopia: none  Upgaze: normal  Downgaze: normal  Conjugate gaze: present    CN VII   Facial expression full, symmetric.     CN VIII   Hearing: intact    CN XII   CN XII normal.     Motor Exam   Muscle bulk: normal  Overall muscle tone: normal    Strength   Right biceps: 5/5  Left biceps: 5/5  Right triceps: 5/5  Left triceps: 5/5  Right interossei: 5/5  Left interossei: 5/5  Right quadriceps: 5/5  Left quadriceps:  "5/5  Right anterior tibial: 5/5  Left anterior tibial: 5/5  Right posterior tibial: 5/5  Left posterior tibial: 5/5    Sensory Exam   Light touch normal.     Gait, Coordination, and Reflexes     Gait  Gait: normal    Coordination   Romberg: negative  Finger to nose coordination: normal  Heel to shin coordination: normal    Tremor   Resting tremor: absent  Action tremor: absent    Reflexes   Right brachioradialis: 2+  Left brachioradialis: 2+  Right biceps: 2+  Left biceps: 2+  Right triceps: 2+  Left triceps: 2+  Right patellar: 2+  Left patellar: 2+  Right achilles: 2+  Left achilles: 2+  Right : 2+  Left : 2+       Vital Signs:   Vitals:    03/22/22 0848   BP: 140/84   Pulse: 86   Temp: 97.1 °F (36.2 °C)   SpO2: 98%   Weight: (!) 152 kg (335 lb)   Height: 198.1 cm (77.99\")     Body mass index is 38.72 kg/m².     Results:   Imaging:   XR Spine Cervical 2 or 3 View    Result Date: 1/13/2022  Vertebral body heights are maintained without evidence of acute fracture and alignment is anatomic without evidence of listhesis or subluxation. Minimal spondylosis change is noted some small osteophytes. The prevertebral soft tissues are unremarkable. The dens is intact.  This report was finalized on 1/13/2022 12:09 PM by Lester Andino.      CT Head Without Contrast    Result Date: 1/13/2022  No acute intracranial abnormality.  Operative changes from prior endoscopic sinus surgery with some persistent mucosal thickening and partial opacification of the left maxillary sinus and ethmoid air cells.   This report was finalized on 1/13/2022 4:13 PM by Lester Andino.      FL Esophagram Complete Single Contrast    Result Date: 1/17/2022  1. Mild gastroesophageal reflux to the level of the thoracic inlet 2. Small sized sliding-type hiatal hernia    This report was finalized on 1/17/2022 4:38 PM by Dr. Eliana Menon MD.      US Gallbladder    Result Date: 1/17/2022  Stone or polyp seen within the gallbladder with no " wall thickening or biliary ductal dilatation. The remainder of the right upper quadrant ultrasound is unremarkable.  D:  01/12/2022 E:  01/12/2022     This report was finalized on 1/17/2022 4:32 PM by Dr. Eliana Menon MD.      XR Chest 1 View    Result Date: 1/17/2022  No acute cardiopulmonary disease.  D:  01/12/2022 E:  01/12/2022  This report was finalized on 1/17/2022 4:30 PM by Dr. Eliana Menon MD.         Assessment / Plan      Assessment/Plan:   Diagnoses and all orders for this visit:    1. Headache syndrome (Primary)  Comments:  Resolved. May continue metoprolol. Stay hydrated. Try to manage sleep cycles for adequate rest.    2. Bradycardia  Comments:  Resolved. Continue to monitor on beta blocker    3. Primary hypertension  Comments:  Cont amlodipine, metoprolol, benazepril    4. Hyperlipidemia, unspecified hyperlipidemia type  Comments:  Cont crestor.       Control risk factors and follow up as needed.    Patient Education:   I have discussed with the patient today the causes and overview of headaches. We discussed the different types of headaches to include tension-type headaches, Migraine headaches and Cluster headaches. We also discussed when headaches could or would be of a more serious condition such as brain infection, inflammation or bleeding within or around the brain. When to seek immediate medical attention or call 911.     Reviewed medications, potential side effects and signs and symptoms to report. Discussed risk versus benefits of treatment plan with patient and/or family-including medications, labs and radiology that may be ordered. Addressed questions and concerns during visit. Patient and/or family verbalized understanding and agree with plan. Instructed to call the office with any questions and report to ER with any life-threatening symptoms.     Follow Up:   Return if symptoms worsen or fail to improve.    During this visit the following were done:  Labs Reviewed [x]    Labs  Ordered []    Radiology Reports Reviewed [x]    Radiology Ordered []    PCP Records Reviewed []    Referring Provider Records Reviewed []    ER Records Reviewed []    Hospital Records Reviewed [x]    History Obtained From Family []    Radiology Images Reviewed []    Other Reviewed [x]    Records Requested []      Celina Castillo, DNP, APRN

## 2022-10-06 ENCOUNTER — OFFICE VISIT (OUTPATIENT)
Dept: FAMILY MEDICINE CLINIC | Facility: CLINIC | Age: 42
End: 2022-10-06

## 2022-10-06 VITALS
DIASTOLIC BLOOD PRESSURE: 90 MMHG | TEMPERATURE: 98.2 F | WEIGHT: 315 LBS | BODY MASS INDEX: 36.45 KG/M2 | OXYGEN SATURATION: 97 % | HEART RATE: 84 BPM | SYSTOLIC BLOOD PRESSURE: 140 MMHG | HEIGHT: 78 IN | RESPIRATION RATE: 17 BRPM

## 2022-10-06 DIAGNOSIS — R31.0 GROSS HEMATURIA: Primary | ICD-10-CM

## 2022-10-06 DIAGNOSIS — R30.0 DYSURIA: ICD-10-CM

## 2022-10-06 DIAGNOSIS — N20.0 KIDNEY STONES: ICD-10-CM

## 2022-10-06 LAB
BILIRUB BLD-MCNC: NEGATIVE MG/DL
CLARITY, POC: ABNORMAL
COLOR UR: ABNORMAL
GLUCOSE UR STRIP-MCNC: ABNORMAL MG/DL
KETONES UR QL: NEGATIVE
LEUKOCYTE EST, POC: NEGATIVE
NITRITE UR-MCNC: NEGATIVE MG/ML
PH UR: 6 [PH] (ref 5–8)
PROT UR STRIP-MCNC: ABNORMAL MG/DL
RBC # UR STRIP: ABNORMAL /UL
SP GR UR: 1.02 (ref 1–1.03)
UROBILINOGEN UR QL: ABNORMAL

## 2022-10-06 PROCEDURE — 99214 OFFICE O/P EST MOD 30 MIN: CPT

## 2022-10-06 PROCEDURE — 81002 URINALYSIS NONAUTO W/O SCOPE: CPT

## 2022-10-06 RX ORDER — LANCETS
EACH MISCELLANEOUS
COMMUNITY
End: 2023-01-31

## 2022-10-06 RX ORDER — TAMSULOSIN HYDROCHLORIDE 0.4 MG/1
1 CAPSULE ORAL 2 TIMES DAILY
Qty: 60 CAPSULE | Refills: 11 | Status: SHIPPED | OUTPATIENT
Start: 2022-10-06 | End: 2023-02-21

## 2022-10-06 RX ORDER — OXYCODONE AND ACETAMINOPHEN 10; 325 MG/1; MG/1
1 TABLET ORAL EVERY 8 HOURS PRN
Qty: 10 TABLET | Refills: 0 | Status: SHIPPED | OUTPATIENT
Start: 2022-10-06 | End: 2022-11-30

## 2022-10-06 RX ORDER — ONDANSETRON 8 MG/1
8 TABLET, ORALLY DISINTEGRATING ORAL EVERY 8 HOURS PRN
Qty: 20 TABLET | Refills: 0 | Status: SHIPPED | OUTPATIENT
Start: 2022-10-06 | End: 2022-11-30

## 2022-10-06 NOTE — PROGRESS NOTES
Office Note     Name: Dom Russell    : 1980     MRN: [id number]     Chief Complaint  Blood in Urine (Right side flank pain-onset 3 days   )    Subjective     History of Present Illness:  Dom Russell is a 42 y.o. male who presents today for concerns of a kidney stone.  Patient reports that he began experiencing vague pain in his right flank 3 days ago.  However, the pain returned this morning.  The patient reports the pain has been nearly constant since this morning.  He describes the pain as a very sharp and stabbing pain that starts in his right back and radiates around to the front of his abdomen.  He describes that at its worst the pain is a 10 out of 10 in severity.  He also reports that he has been seeing blood in his urine since this morning.  He describes the pain as mainly constant, however sometimes it will acutely worsen.  Patient reports he has been nauseous, however he has not vomited.  He does report that he is not able to get comfortable.  He also reports that it is mildly painful to pee.  He has no past history of kidney stones.  He is not experiencing any fever or chills.  He denies right lower quadrant pain.  He is experiencing diarrhea, however he says this is chronic as he has a low functioning gallbladder.  the patient reports that he does not drink very much soda, however he does say he drinks a lot of coffee.  He does report that he has been drinking less water than normal, since a change in his job.  Of note, the patient had bariatric surgery in 2021.      Review of Systems:   Review of Systems   Constitutional: Negative for chills and fever.   Respiratory: Negative for cough, choking and shortness of breath.    Cardiovascular: Negative for chest pain and palpitations.   Gastrointestinal: Positive for abdominal pain (RUQ pain) and nausea. Negative for blood in stool, constipation, rectal pain and vomiting.   Genitourinary: Positive for flank pain and hematuria.  Negative for scrotal swelling and testicular pain.       Past Medical History:   Past Medical History:   Diagnosis Date   • Acute bronchitis    • Adult-onset obesity    • Agitated    • Allergic rhinitis    • Anxiety and depression    • Asthma    • Cellulitis    • Chronic GERD    • Diabetes mellitus (HCC)    • Diarrhea    • Dyspnea    • Exposure to influenza    • GERD (gastroesophageal reflux disease)    • Gonadal dysgenesis    • Headache    • Hemorrhoid    • Hyperlipidemia    • Hypersomnia with sleep apnea    • Hypertension, benign    • Leg skin lesion, right    • Mixed dyslipidemia    • Morbid obesity (HCC)    • Pain in joint involving shoulder region    • Pain in left lower leg    • Pain of left shoulder region    • Panniculitis    • Pneumococcal vaccination declined    • Pneumonia    • Primary hypertension    • Scabies    • Sinusitis, acute    • Spindle cell type atypical fibroxanthoma    • Sprain of ankle, sequela        Past Surgical History:   Past Surgical History:   Procedure Laterality Date   • EAR TUBES     • GASTRIC SLEEVE LAPAROSCOPIC     • HIP SURGERY Right     HIP SLIPPED EPHISLEA   • SINUS SURGERY     • TONSILLECTOMY         Immunizations:   Immunization History   Administered Date(s) Administered   • Pneumococcal Polysaccharide (PPSV23) 12/20/2011        Medications:     Current Outpatient Medications:   •  Accu-Chek Softclix Lancets lancets, Accu-Chek Softclix Lancets  USE AS DIRECTED THREE TIMES A DAY, Disp: , Rfl:   •  acetaminophen (TYLENOL) 325 MG tablet, Take 2 tablets by mouth Every 4 (Four) Hours As Needed for Mild Pain  or Fever (Temperature greater than or equal to 37 C)., Disp:  , Rfl:   •  amLODIPine (NORVASC) 10 MG tablet, Take 10 mg by mouth Daily., Disp: , Rfl:   •  benazepril (LOTENSIN) 20 MG tablet, Take 20 mg by mouth Daily., Disp: , Rfl:   •  famotidine (PEPCID) 20 MG tablet, Take 1 tablet by mouth 2 (Two) Times a Day Before Meals., Disp: 60 tablet, Rfl: 3  •  glucose blood test  strip, Accu-Chek Guide Me Glucose Meter, Disp: , Rfl:   •  lansoprazole (PREVACID) 30 MG capsule, lansoprazole 30 mg capsule,delayed release  Take 1 capsule every day by oral route., Disp: , Rfl:   •  metoprolol succinate XL (TOPROL-XL) 50 MG 24 hr tablet, Take 50 mg by mouth Daily., Disp: , Rfl:   •  ondansetron ODT (ZOFRAN-ODT) 8 MG disintegrating tablet, Place 1 tablet on the tongue Every 8 (Eight) Hours As Needed for Nausea or Vomiting., Disp: 20 tablet, Rfl: 0  •  oxyCODONE-acetaminophen (PERCOCET)  MG per tablet, Take 1 tablet by mouth Every 8 (Eight) Hours As Needed for Moderate Pain or Severe Pain., Disp: 10 tablet, Rfl: 0  •  pantoprazole (PROTONIX) 40 MG EC tablet, Take 1 tablet by mouth 2 (Two) Times a Day Before Meals., Disp: 60 tablet, Rfl: 3  •  rosuvastatin (CRESTOR) 40 MG tablet, Take 1 tablet by mouth Every Night for 30 days., Disp: 30 tablet, Rfl: 2  •  sertraline (ZOLOFT) 100 MG tablet, Take 200 mg by mouth Daily., Disp: , Rfl:   •  tamsulosin (FLOMAX) 0.4 MG capsule 24 hr capsule, Take 1 capsule by mouth 2 (Two) Times a Day., Disp: 60 capsule, Rfl: 11    Allergies:   Allergies   Allergen Reactions   • Clarithromycin Hives and Anaphylaxis   • Ceftazidime Hives   • Cephalosporins Provider Review Needed   • Codeine Hives   • Penicillins Hives   • Unasyn [Ampicillin-Sulbactam Sodium] Hives       Family History:   Family History   Problem Relation Age of Onset   • Bradycardia Sister    • Breast cancer Other    • Diabetes Other    • Hypertension Other    • Lung cancer Other    • Heart attack Other    • Skin cancer Other        Social History:   Social History     Socioeconomic History   • Marital status:    Tobacco Use   • Smoking status: Former Smoker     Packs/day: 0.50     Types: Cigarettes     Quit date: 2021     Years since quittin.3   • Smokeless tobacco: Never Used   Substance and Sexual Activity   • Alcohol use: Not Currently   • Drug use: Never   • Sexual activity: Defer  "        Objective     Vital Signs  /90 (BP Location: Left arm, Cuff Size: Large Adult)   Pulse 84   Temp 98.2 °F (36.8 °C)   Resp 17   Ht 198.1 cm (78\")   Wt (!) 143 kg (316 lb)   SpO2 97%   BMI 36.52 kg/m²   Estimated body mass index is 36.52 kg/m² as calculated from the following:    Height as of this encounter: 198.1 cm (78\").    Weight as of this encounter: 143 kg (316 lb).          Physical Exam  Vitals and nursing note reviewed.   Constitutional:       Appearance: He is obese.      Comments: Patient had to stand during history taking because sitting still was too uncomfortable   HENT:      Head: Normocephalic and atraumatic.   Eyes:      Extraocular Movements: Extraocular movements intact.      Pupils: Pupils are equal, round, and reactive to light.   Cardiovascular:      Rate and Rhythm: Normal rate.      Heart sounds: No murmur heard.    No friction rub. No gallop.   Pulmonary:      Effort: Pulmonary effort is normal.      Breath sounds: No wheezing or rhonchi.   Abdominal:      General: Bowel sounds are normal.      Palpations: Abdomen is soft.      Tenderness: Tenderness: Mildly tender to palpation in the RUQ, No tenderness to palpation in the RLQ. There is no right CVA tenderness, left CVA tenderness, guarding or rebound.      Comments: Negative Rosving, Negative Psoas, No tenderness at McBurney's Point  Negative Siegel Sign   Neurological:      Mental Status: He is alert.   Psychiatric:         Mood and Affect: Mood normal.         Thought Content: Thought content normal.          Assessment and Plan     1. Gross hematuria  -Educated patient that hematuria is often a symptom of kidney stones.  -Urinalysis did show presence of many red blood cells.  - POCT urinalysis dipstick, manual  - POCT urinalysis dipstick, automated    2. Dysuria  -Urinalysis was negative for nitrites and leukocytes.  The dysuria is not due to cystitis.  - POCT urinalysis dipstick, manual  - POCT urinalysis dipstick, " automated    3. Kidney stones  -The patient was educated that his symptoms of right flank pain radiating around to the front of his abdomen along with hematuria are highly suggestive of a kidney stone.  The patient was educated about getting a CT scan to diagnose definitively.  At this time, the patient would prefer to try to pass the stone on his own without imaging.  -The patient was encouraged to increase hydration, as it can help to push the kidney stone along the urinary tract.  The patient was told he may use toilet paper or a small strainer to catch the kidney stone for analysis.  -The patient was prescribed Flomax to help with elimination of the kidney stone.  He was also prescribed Zofran for nausea.  -Due to his recent bariatric surgery, he is not a good candidate for NSAID pain relief.  I discussed this with my supervising physician.  He will be prescribing pain medicine only to be used as needed for this instance.  The patient was educated not to drive or operate heavy machinery after taking his pain medicine.  The patient was educated on the addictive potential of this medicine.  He has no history of substance use.  The patient understands this medicine will not be refilled and is only for use in this acute instance.  -The patient is encouraged to seek emergent care if pain acutely worsens.  He was also educated to report to the emergency room if his pain starts to localize only in the right lower quadrant or he develops a fever or chills, as this could be suggestive of appendicitis or other localized infection.  -The patient demonstrated understanding and had no further questions.  - tamsulosin (FLOMAX) 0.4 MG capsule 24 hr capsule; Take 1 capsule by mouth 2 (Two) Times a Day.  Dispense: 60 capsule; Refill: 11  - ondansetron ODT (ZOFRAN-ODT) 8 MG disintegrating tablet; Place 1 tablet on the tongue Every 8 (Eight) Hours As Needed for Nausea or Vomiting.  Dispense: 20 tablet; Refill: 0  -  oxyCODONE-acetaminophen (PERCOCET)  MG per tablet; Take 1 tablet by mouth Every 8 (Eight) Hours As Needed for Moderate Pain or Severe Pain.  Dispense: 10 tablet; Refill: 0       Follow Up  Return in about 3 weeks (around 10/27/2022), or With Dr Brand, for Recheck.    BRUNA [facility]  [address] [place] [address] [address] [place] [address] [phone]

## 2022-11-30 DIAGNOSIS — K21.9 CHRONIC GERD: Primary | ICD-10-CM

## 2022-11-30 RX ORDER — OMEPRAZOLE 40 MG/1
40 CAPSULE, DELAYED RELEASE ORAL DAILY
Qty: 90 CAPSULE | Refills: 3 | Status: SHIPPED | OUTPATIENT
Start: 2022-11-30 | End: 2022-11-30 | Stop reason: SDUPTHER

## 2022-11-30 RX ORDER — OMEPRAZOLE 40 MG/1
40 CAPSULE, DELAYED RELEASE ORAL DAILY
Qty: 90 CAPSULE | Refills: 3 | Status: SHIPPED | OUTPATIENT
Start: 2022-11-30 | End: 2023-03-07 | Stop reason: SDUPTHER

## 2022-12-07 ENCOUNTER — OFFICE VISIT (OUTPATIENT)
Dept: FAMILY MEDICINE CLINIC | Facility: CLINIC | Age: 42
End: 2022-12-07

## 2022-12-07 VITALS
SYSTOLIC BLOOD PRESSURE: 136 MMHG | HEART RATE: 78 BPM | RESPIRATION RATE: 17 BRPM | DIASTOLIC BLOOD PRESSURE: 78 MMHG | BODY MASS INDEX: 36.45 KG/M2 | HEIGHT: 78 IN | WEIGHT: 315 LBS | TEMPERATURE: 98 F | OXYGEN SATURATION: 98 %

## 2022-12-07 DIAGNOSIS — N20.2 CALCULUS OF KIDNEY WITH CALCULUS OF URETER: ICD-10-CM

## 2022-12-07 DIAGNOSIS — E11.620 TYPE 2 DIABETES MELLITUS WITH DIABETIC DERMATITIS, WITHOUT LONG-TERM CURRENT USE OF INSULIN: Primary | ICD-10-CM

## 2022-12-07 DIAGNOSIS — I10 PRIMARY HYPERTENSION: ICD-10-CM

## 2022-12-07 DIAGNOSIS — Z00.00 WELL ADULT EXAM: ICD-10-CM

## 2022-12-07 DIAGNOSIS — E78.2 MIXED HYPERLIPIDEMIA: ICD-10-CM

## 2022-12-07 LAB
ALBUMIN SERPL-MCNC: 4.7 G/DL (ref 3.5–5.2)
ALBUMIN UR-MCNC: <1.2 MG/DL
ALBUMIN/GLOB SERPL: 3.1 G/DL
ALP SERPL-CCNC: 65 U/L (ref 39–117)
ALT SERPL W P-5'-P-CCNC: 26 U/L (ref 1–41)
ANION GAP SERPL CALCULATED.3IONS-SCNC: 11.9 MMOL/L (ref 5–15)
AST SERPL-CCNC: 27 U/L (ref 1–40)
BILIRUB SERPL-MCNC: 0.4 MG/DL (ref 0–1.2)
BUN SERPL-MCNC: 18 MG/DL (ref 6–20)
BUN/CREAT SERPL: 22.2 (ref 7–25)
CALCIUM SPEC-SCNC: 9.4 MG/DL (ref 8.6–10.5)
CHLORIDE SERPL-SCNC: 104 MMOL/L (ref 98–107)
CHOLEST SERPL-MCNC: 137 MG/DL (ref 0–200)
CO2 SERPL-SCNC: 27.1 MMOL/L (ref 22–29)
CREAT SERPL-MCNC: 0.81 MG/DL (ref 0.76–1.27)
DEPRECATED RDW RBC AUTO: 44.4 FL (ref 37–54)
EGFRCR SERPLBLD CKD-EPI 2021: 114.3 ML/MIN/1.73
ERYTHROCYTE [DISTWIDTH] IN BLOOD BY AUTOMATED COUNT: 13.8 % (ref 12.3–15.4)
GLOBULIN UR ELPH-MCNC: 1.5 GM/DL
GLUCOSE SERPL-MCNC: 111 MG/DL (ref 65–99)
HCT VFR BLD AUTO: 44.5 % (ref 37.5–51)
HDLC SERPL-MCNC: 26 MG/DL (ref 40–60)
HGB BLD-MCNC: 15.3 G/DL (ref 13–17.7)
LDLC SERPL CALC-MCNC: 83 MG/DL (ref 0–100)
LDLC/HDLC SERPL: 3.05 {RATIO}
MCH RBC QN AUTO: 29.7 PG (ref 26.6–33)
MCHC RBC AUTO-ENTMCNC: 34.4 G/DL (ref 31.5–35.7)
MCV RBC AUTO: 86.4 FL (ref 79–97)
PLATELET # BLD AUTO: 148 10*3/MM3 (ref 140–450)
PMV BLD AUTO: 11.3 FL (ref 6–12)
POTASSIUM SERPL-SCNC: 4.5 MMOL/L (ref 3.5–5.2)
PROT SERPL-MCNC: 6.2 G/DL (ref 6–8.5)
RBC # BLD AUTO: 5.15 10*6/MM3 (ref 4.14–5.8)
SODIUM SERPL-SCNC: 143 MMOL/L (ref 136–145)
TRIGL SERPL-MCNC: 159 MG/DL (ref 0–150)
TSH SERPL DL<=0.05 MIU/L-ACNC: 3.1 UIU/ML (ref 0.27–4.2)
VLDLC SERPL-MCNC: 28 MG/DL (ref 5–40)
WBC NRBC COR # BLD: 6.73 10*3/MM3 (ref 3.4–10.8)

## 2022-12-07 PROCEDURE — 80050 GENERAL HEALTH PANEL: CPT | Performed by: FAMILY MEDICINE

## 2022-12-07 PROCEDURE — 90715 TDAP VACCINE 7 YRS/> IM: CPT | Performed by: FAMILY MEDICINE

## 2022-12-07 PROCEDURE — 82607 VITAMIN B-12: CPT | Performed by: FAMILY MEDICINE

## 2022-12-07 PROCEDURE — 83036 HEMOGLOBIN GLYCOSYLATED A1C: CPT | Performed by: FAMILY MEDICINE

## 2022-12-07 PROCEDURE — 82043 UR ALBUMIN QUANTITATIVE: CPT | Performed by: FAMILY MEDICINE

## 2022-12-07 PROCEDURE — 99214 OFFICE O/P EST MOD 30 MIN: CPT | Performed by: FAMILY MEDICINE

## 2022-12-07 PROCEDURE — 90471 IMMUNIZATION ADMIN: CPT | Performed by: FAMILY MEDICINE

## 2022-12-07 PROCEDURE — 80061 LIPID PANEL: CPT | Performed by: FAMILY MEDICINE

## 2022-12-07 PROCEDURE — 86803 HEPATITIS C AB TEST: CPT | Performed by: FAMILY MEDICINE

## 2022-12-07 NOTE — PROGRESS NOTES
Follow Up Office Visit      Date: 2022   Patient Name: Dom Russell  : 1980   MRN: [id number]     Chief Complaint:    Chief Complaint   Patient presents with   • Flank Pain     Fu with kidney stone      pt went to ER Dec 3rd       History of Present Illness: Dom Russell is a 42 y.o. male who is here today for for follow-up of his kidney stone.  Patient had episodes of hematuria 6 weeks ago and developed significant amount of pain on Saturday prompting a visit to the emergency department where he had a CT scan at that time revealing a 5 mm kidney stone.  He had some mild hydronephrosis with it as well.  He is uncertain of when his urology appointment is as he has not contacted have them yet.    Symptoms are improved.  He is drinking plenty of fluids and is taking Flomax for treatment of his kidney stone.  He denies any other problems at present time.  He denies fever or chills or other symptomatic complaints.  He denies cardiovascular symptoms as well as gastrointestinal complaints.    Subjective      Review of Systems:   Review of Systems   Constitutional: Negative for activity change, appetite change and fatigue.   Respiratory: Negative for cough and shortness of breath.    Cardiovascular: Negative for chest pain, palpitations and leg swelling.   Gastrointestinal: Negative for abdominal pain, constipation, diarrhea, nausea and vomiting.   Genitourinary: Negative for dysuria, flank pain, frequency and urgency.   Neurological: Negative for dizziness, weakness and memory problem.       I have reviewed the patients family history, social history, past medical history, past surgical history and have updated it as appropriate.     Medications:     Current Outpatient Medications:   •  Accu-Chek Softclix Lancets lancets, Accu-Chek Softclix Lancets  USE AS DIRECTED THREE TIMES A DAY, Disp: , Rfl:   •  amLODIPine (NORVASC) 10 MG tablet, Take 10 mg by mouth Daily., Disp: , Rfl:   •  benazepril (LOTENSIN)  "20 MG tablet, Take 20 mg by mouth Daily., Disp: , Rfl:   •  glucose blood test strip, Accu-Chek Guide Me Glucose Meter, Disp: , Rfl:   •  metoprolol succinate XL (TOPROL-XL) 50 MG 24 hr tablet, Take 50 mg by mouth Daily., Disp: , Rfl:   •  omeprazole (priLOSEC) 40 MG capsule, Take 1 capsule by mouth Daily., Disp: 90 capsule, Rfl: 3  •  rosuvastatin (CRESTOR) 40 MG tablet, rosuvastatin 40 mg tablet  TAKE ONE TABLET BY MOUTH EVERY night, Disp: , Rfl:   •  sertraline (ZOLOFT) 100 MG tablet, Take 200 mg by mouth Daily., Disp: , Rfl:   •  sucralfate (CARAFATE) 1 GM/10ML suspension, Take 10 mL 4 times a day by oral route for 30 days., Disp: , Rfl:   •  tamsulosin (FLOMAX) 0.4 MG capsule 24 hr capsule, Take 1 capsule by mouth 2 (Two) Times a Day., Disp: 60 capsule, Rfl: 11    Allergies:   Allergies   Allergen Reactions   • Clarithromycin Hives and Anaphylaxis   • Ceftazidime Hives   • Cephalosporins Provider Review Needed   • Codeine Hives   • Penicillins Hives   • Unasyn [Ampicillin-Sulbactam Sodium] Hives       Immunizations:   Immunization History   Administered Date(s) Administered   • Flu Vaccine Quad PF >36MO 10/18/2021, 10/03/2022   • Fluzone High Dose =>65 Years ([employer] ONLY) 10/30/2017   • Fluzone Quad >6mos (Multi-dose) 11/30/2018, 10/01/2019   • Hepatitis A 11/01/2018, 11/30/2018, 06/21/2019   • Hepatitis B 02/14/2006, 06/27/2006, 08/29/2006, 08/31/2020, 11/09/2020   • Pneumococcal Polysaccharide (PPSV23) 12/20/2011, 06/06/2019   • Td 08/14/1997   • Tdap 12/07/2022        Objective     Physical Exam: Please see above  Vital Signs:   Vitals:    12/07/22 0743   BP: 136/78   BP Location: Left arm   Patient Position: Sitting   Cuff Size: Large Adult   Pulse: 78   Resp: 17   Temp: 98 °F (36.7 °C)   SpO2: 98%   Weight: (!) 151 kg (332 lb)   Height: 198.1 cm (78\")     Body mass index is 38.37 kg/m².  Class 2 Severe Obesity (BMI >=35 and <=39.9). Obesity-related health conditions include the following: obstructive " sleep apnea, diabetes mellitus and dyslipidemias. Obesity is improving with treatment. BMI is is above average; BMI management plan is completed. We discussed portion control, increasing exercise and consulting a Bariatric surgeon.       Physical Exam  Vitals and nursing note reviewed.   Constitutional:       Appearance: Normal appearance.   HENT:      Head: Normocephalic and atraumatic.      Nose: Nose normal.      Mouth/Throat:      Pharynx: Oropharynx is clear.   Eyes:      Extraocular Movements: Extraocular movements intact.      Pupils: Pupils are equal, round, and reactive to light.   Neck:      Thyroid: No thyroid mass or thyromegaly.      Trachea: Trachea normal.   Cardiovascular:      Rate and Rhythm: Normal rate and regular rhythm.      Pulses: Normal pulses. No decreased pulses.           Dorsalis pedis pulses are 2+ on the right side and 2+ on the left side.        Posterior tibial pulses are 2+ on the right side and 2+ on the left side.      Heart sounds: Normal heart sounds.   Pulmonary:      Effort: Pulmonary effort is normal.      Breath sounds: Normal breath sounds.   Abdominal:      General: Abdomen is flat. Bowel sounds are normal.      Palpations: Abdomen is soft.      Tenderness: There is no abdominal tenderness.   Musculoskeletal:      Cervical back: Neck supple.      Right lower leg: No edema.      Left lower leg: No edema.      Right foot: No deformity.      Left foot: No deformity.   Feet:      Right foot:      Protective Sensation: 3 sites tested. 3 sites sensed.      Skin integrity: Callus and dry skin present. No ulcer, skin breakdown or erythema.      Left foot:      Protective Sensation: 3 sites tested. 3 sites sensed.      Skin integrity: Callus and dry skin present. No ulcer, skin breakdown or erythema.   Lymphadenopathy:      Cervical: No cervical adenopathy.   Skin:     General: Skin is warm and dry.   Neurological:      General: No focal deficit present.      Mental Status: He is  alert and oriented to person, place, and time.      Cranial Nerves: Cranial nerves are intact.      Sensory: Sensation is intact.      Motor: Motor function is intact.      Coordination: Coordination is intact.   Psychiatric:         Attention and Perception: Attention normal.         Mood and Affect: Mood normal.         Speech: Speech normal.         Behavior: Behavior normal.         Procedures    Results:   Labs:        POCT Results (if applicable):   Results for orders placed or performed in visit on 10/06/22   POCT urinalysis dipstick, manual    Specimen: Urine   Result Value Ref Range    Color Red (A) Yellow, Straw, Dark Yellow, Kristy    Clarity, UA Turbid (A) Clear    Glucose, UA 2+ (A) Negative mg/dL    Bilirubin Negative Negative    Ketones, UA Negative Negative    Specific Gravity  1.020 1.005 - 1.030    Blood, UA Moderate (A) Negative    pH, Urine 6.0 5.0 - 8.0    Protein, POC 2+ (A) Negative mg/dL    Urobilinogen, UA 0.2 E.U./dL Normal, 0.2 E.U./dL    Leukocytes Negative Negative    Nitrite, UA Negative Negative       Imaging:   No valid procedures specified.     Measures:   Advanced Care Planning:   Patient does not have an advance directive, information provided.    Smoking Cessation:   Non-smoker.    Assessment / Plan      Assessment/Plan:   Diagnoses and all orders for this visit:    1. Type 2 diabetes mellitus with skin complication, without long-term current use of insulin ([facility]) (Primary)   Patient is diabetic foot exam did reveal lots of abnormalities including possible fungal infection of his feet.  He also appears to have some issues with respect to callus formation.  He has seen doctors in the past for this.  We will try Vicks vapor rub to the majority of his foot.  He will soak his feet and try to use some type of tyra stone to resolve some of these calluses.  He is also been advised that he may need to have a follow-up appointment with the podiatrist.  We will continue to monitor and address  if he has other complaints we will further assess.  Otherwise he has been doing well with respect to his diabetes.  We will continue to monitor and treat accordingly.  2. Mixed hyperlipidemia   We will obtain a lipid profile.  If it does appear that his LDLs less than 70 we will modification.  If his LDL is greater than 70 we will adjust his medications.    3. Primary hypertension   Blood pressure doing well currently.  No adjustments are necessary.  If his systolic blood pressure continues to remain above 140 we will make adjustments as necessary.    4.  Maintenance labs  Patient will have his maintenance labs obtained today.  -     CBC (No Diff); Future  -     Comprehensive Metabolic Panel; Future  -     Hemoglobin A1c; Future  -     Hepatitis C Antibody; Future  -     Lipid Panel; Future  -     MicroAlbumin, Urine, Random - Urine, Clean Catch; Future  -     TSH Rfx On Abnormal To Free T4; Future  -     Vitamin B12; Future  -     Tdap Vaccine Greater Than or Equal To 8yo IM  -     CBC (No Diff)  -     Comprehensive Metabolic Panel  -     Hemoglobin A1c  -     Hepatitis C Antibody  -     Lipid Panel  -     MicroAlbumin, Urine, Random - Urine, Clean Catch  -     TSH Rfx On Abnormal To Free T4  -     Vitamin B12    5. Calculus of kidney with calculus of ureter   Patient did have a confirmed kidney stone based on CT scan performed over the weekend.  He is asymptomatic at present time.  If he has not been contacted by the urologist in 1 week's time we will make arrangements for follow-up.      Follow Up:   Return in about 3 months (around 3/7/2023) for Recheck.      At [facility], we believe that sharing information builds trust and better relationships. You are receiving this note because you recently visited [facility]. It is possible you will see health information before a provider has talked with you about it. This kind of information can be easy to misunderstand. To help you fully understand what it means  for your health, we urge you to discuss this note with your provider.    Rigoberto Brand MD  [facility]  Answers for HPI/ROS submitted by the patient on 12/6/2022  What is the primary reason for your visit?: Other  Please describe your symptoms.: Follow up visit. Diagnosed with a 5 mm kidney stone on 12-3-22  Have you had these symptoms before?: Yes  How long have you been having these symptoms?: 5-7 days

## 2022-12-08 LAB
HBA1C MFR BLD: 6.8 % (ref 4.8–5.6)
HCV AB SER DONR QL: NORMAL
VIT B12 BLD-MCNC: 587 PG/ML (ref 211–946)

## 2023-01-11 ENCOUNTER — OFFICE VISIT (OUTPATIENT)
Dept: FAMILY MEDICINE CLINIC | Facility: CLINIC | Age: 43
End: 2023-01-11
Payer: COMMERCIAL

## 2023-01-11 VITALS
DIASTOLIC BLOOD PRESSURE: 64 MMHG | OXYGEN SATURATION: 98 % | BODY MASS INDEX: 38.92 KG/M2 | SYSTOLIC BLOOD PRESSURE: 118 MMHG | TEMPERATURE: 96.8 F | HEART RATE: 67 BPM | WEIGHT: 315 LBS

## 2023-01-11 DIAGNOSIS — R05.1 ACUTE COUGH: ICD-10-CM

## 2023-01-11 DIAGNOSIS — R09.81 NASAL CONGESTION: Primary | ICD-10-CM

## 2023-01-11 LAB
EXPIRATION DATE: NORMAL
FLUAV AG UPPER RESP QL IA.RAPID: NOT DETECTED
FLUBV AG UPPER RESP QL IA.RAPID: NOT DETECTED
INTERNAL CONTROL: NORMAL
Lab: NORMAL
SARS-COV-2 AG UPPER RESP QL IA.RAPID: NOT DETECTED

## 2023-01-11 PROCEDURE — 99213 OFFICE O/P EST LOW 20 MIN: CPT

## 2023-01-11 PROCEDURE — 87428 SARSCOV & INF VIR A&B AG IA: CPT

## 2023-01-11 RX ORDER — CETIRIZINE HYDROCHLORIDE 10 MG/1
10 TABLET, CHEWABLE ORAL
COMMUNITY

## 2023-01-11 RX ORDER — ALBUTEROL SULFATE 90 UG/1
2 AEROSOL, METERED RESPIRATORY (INHALATION) EVERY 4 HOURS PRN
Qty: 6.7 G | Refills: 0 | Status: SHIPPED | OUTPATIENT
Start: 2023-01-11

## 2023-01-11 RX ORDER — FENOFIBRATE 134 MG/1
134 CAPSULE ORAL
COMMUNITY

## 2023-01-11 RX ORDER — SERTRALINE HYDROCHLORIDE 100 MG/1
200 TABLET, FILM COATED ORAL
COMMUNITY
End: 2023-03-07

## 2023-01-11 RX ORDER — ECHINACEA PURPUREA EXTRACT 125 MG
1 TABLET ORAL AS NEEDED
Qty: 15 ML | Refills: 0 | Status: SHIPPED | OUTPATIENT
Start: 2023-01-11 | End: 2023-01-31

## 2023-01-11 RX ORDER — PREDNISONE 10 MG/1
40 TABLET ORAL DAILY
Qty: 20 TABLET | Refills: 0 | Status: SHIPPED | OUTPATIENT
Start: 2023-01-11 | End: 2023-01-16

## 2023-01-11 RX ORDER — METOPROLOL SUCCINATE 100 MG/1
100 TABLET, EXTENDED RELEASE ORAL
COMMUNITY
End: 2023-01-11 | Stop reason: SDUPTHER

## 2023-01-11 RX ORDER — OLMESARTAN MEDOXOMIL, AMLODIPINE AND HYDROCHLOROTHIAZIDE TABLET 40/10/25 MG 40; 10; 25 MG/1; MG/1; MG/1
TABLET ORAL
COMMUNITY
End: 2023-01-11 | Stop reason: SDUPTHER

## 2023-01-11 RX ORDER — LANSOPRAZOLE 30 MG/1
30 CAPSULE, DELAYED RELEASE ORAL
COMMUNITY
End: 2023-01-11 | Stop reason: SDUPTHER

## 2023-01-11 NOTE — PROGRESS NOTES
Office Note     Name: Dom Russell    : 1980     MRN: [id number]     Chief Complaint  Sore throat, nasal congestion, cough    History of Present Illness:  Dom Russell is a 42 y.o. male who presents today for sore throat, nasal congestion, cough, and sinus pressure.  He reports that his sore throat began 2 weeks ago.  He reports it started as a tickle, scratchy throat.  He reports that now it hurts to swallow.  He denies any difficulty with swallowing.  He denies difficulty opening his mouth or having any neck pain.  He reports that his cough is intermittently productive.  He does report some intermittent chest tightness, worse when trying to take a deep breath.  He denies tightness related to physical activity.  He denies chest pain.  He denies wheezing.  He has also had some left ear pain for the past 2 days.  He denies any fever or chills.  He denies any known sick contacts.  At home he has been doing sinus rinses, Mucinex, and NyQuil.  He does take Zyrtec daily for allergies.  He does report having an asthma history, but has no inhaler at home.      Subjective     Review of Systems:   Review of Systems   Constitutional: Negative for appetite change, chills, fatigue and fever.   HENT: Positive for congestion, ear pain, postnasal drip, rhinorrhea, sinus pressure and sore throat. Negative for trouble swallowing and voice change.    Respiratory: Positive for cough and chest tightness. Negative for shortness of breath and wheezing.    Cardiovascular: Negative for chest pain, palpitations and leg swelling.   Gastrointestinal: Negative for abdominal pain, constipation, diarrhea, nausea and vomiting.   Musculoskeletal: Negative for arthralgias, neck pain and neck stiffness.   Neurological: Negative for dizziness, syncope, weakness, light-headedness and headache.       I have reviewed the patients family history, social history, past medical history, past surgical history and have updated it as appropriate.      Past Medical History:   Past Medical History:   Diagnosis Date   • Acute bronchitis    • Adult-onset obesity    • Agitated    • Allergic rhinitis    • Anxiety and depression    • Asthma    • Cellulitis    • Chronic GERD    • Diabetes mellitus (HCC)    • Diarrhea    • Dyspnea    • Exposure to influenza    • GERD (gastroesophageal reflux disease)    • Gonadal dysgenesis    • Headache    • Hemorrhoid    • Hyperlipidemia    • Hypersomnia with sleep apnea    • Hypertension, benign    • Leg skin lesion, right    • Mixed dyslipidemia    • Morbid obesity (HCC)    • Pain in joint involving shoulder region    • Pain in left lower leg    • Pain of left shoulder region    • Panniculitis    • Pneumococcal vaccination declined    • Pneumonia    • Primary hypertension    • Scabies    • Sinusitis, acute    • Spindle cell type atypical fibroxanthoma    • Sprain of ankle, sequela        Past Surgical History:   Past Surgical History:   Procedure Laterality Date   • EAR TUBES     • GASTRIC SLEEVE LAPAROSCOPIC     • HIP SURGERY Right     HIP SLIPPED EPHISLEA   • SINUS SURGERY     • TONSILLECTOMY         Family History:   Family History   Problem Relation Age of Onset   • Hypertension Mother    • Hyperlipidemia Mother    • Diabetes Mother    • Hypertension Father    • Bradycardia Sister    • Breast cancer Other    • Diabetes Other    • Hypertension Other    • Lung cancer Other    • Heart attack Other    • Skin cancer Other        Social History:   Social History     Socioeconomic History   • Marital status:    Tobacco Use   • Smoking status: Every Day     Packs/day: 0.50     Types: Cigarettes     Start date: 11/1/2022     Passive exposure: Past   • Smokeless tobacco: Never   Vaping Use   • Vaping Use: Never used   Substance and Sexual Activity   • Alcohol use: Not Currently   • Drug use: Never   • Sexual activity: Defer       Immunizations:   Immunization History   Administered Date(s) Administered   • Flu Vaccine Quad PF  >36MO 10/18/2021, 10/03/2022   • Fluzone High Dose =>65 Years ([facility] ONLY) 10/30/2017   • Fluzone Quad >6mos (Multi-dose) 11/30/2018, 10/01/2019   • Hepatitis A 11/01/2018, 11/30/2018, 06/21/2019   • Hepatitis B 02/14/2006, 06/27/2006, 08/29/2006, 08/31/2020, 11/09/2020   • Pneumococcal Polysaccharide (PPSV23) 12/20/2011, 06/06/2019   • Td 08/14/1997   • Tdap 12/07/2022        Medications:     Current Outpatient Medications:   •  Accu-Chek Softclix Lancets lancets, Accu-Chek Softclix Lancets  USE AS DIRECTED THREE TIMES A DAY, Disp: , Rfl:   •  amLODIPine (NORVASC) 10 MG tablet, Take 10 mg by mouth Daily., Disp: , Rfl:   •  benazepril (LOTENSIN) 20 MG tablet, Take 20 mg by mouth Daily., Disp: , Rfl:   •  cetirizine (ZyrTEC) 10 MG chewable tablet, 10 mg., Disp: , Rfl:   •  fenofibrate micronized (LOFIBRA) 134 MG capsule, 134 mg., Disp: , Rfl:   •  glucose blood test strip, Accu-Chek Guide Me Glucose Meter, Disp: , Rfl:   •  metoprolol succinate XL (TOPROL-XL) 50 MG 24 hr tablet, Take 50 mg by mouth Daily., Disp: , Rfl:   •  omeprazole (priLOSEC) 40 MG capsule, Take 1 capsule by mouth Daily., Disp: 90 capsule, Rfl: 3  •  rosuvastatin (CRESTOR) 40 MG tablet, rosuvastatin 40 mg tablet  TAKE ONE TABLET BY MOUTH EVERY night, Disp: , Rfl:   •  sertraline (ZOLOFT) 100 MG tablet, 200 mg., Disp: , Rfl:   •  sucralfate (CARAFATE) 1 GM/10ML suspension, Take 10 mL 4 times a day by oral route for 30 days., Disp: , Rfl:   •  tamsulosin (FLOMAX) 0.4 MG capsule 24 hr capsule, Take 1 capsule by mouth 2 (Two) Times a Day., Disp: 60 capsule, Rfl: 11  •  albuterol sulfate  (90 Base) MCG/ACT inhaler, Inhale 2 puffs Every 4 (Four) Hours As Needed for Wheezing (Chest tightness)., Disp: 6.7 g, Rfl: 0  •  predniSONE (DELTASONE) 10 MG tablet, Take 4 tablets by mouth Daily for 5 days., Disp: 20 tablet, Rfl: 0  •  sodium chloride (Ocean Nasal Spray) 0.65 % nasal spray, 1 spray into the nostril(s) as directed by provider As Needed for  "Congestion., Disp: 15 mL, Rfl: 0    Allergies:   Allergies   Allergen Reactions   • Clarithromycin Hives and Anaphylaxis   • Ceftazidime Hives   • Cephalosporins Provider Review Needed   • Codeine Hives   • Penicillins Hives   • Unasyn [Ampicillin-Sulbactam Sodium] Hives       Objective     Vital Signs  Vitals:    01/11/23 0824   BP: 118/64   BP Location: Left arm   Patient Position: Sitting   Cuff Size: Adult   Pulse: 67   Temp: 96.8 °F (36 °C)   TempSrc: Temporal   SpO2: 98%   Weight: (!) 153 kg (336 lb 12.8 oz)   PainSc:   8   PainLoc: Throat     Estimated body mass index is 38.92 kg/m² as calculated from the following:    Height as of 12/7/22: 198.1 cm (78\").    Weight as of this encounter: 153 kg (336 lb 12.8 oz).          Physical Exam  Vitals and nursing note reviewed.   Constitutional:       General: He is not in acute distress.     Appearance: Normal appearance. He is not ill-appearing, toxic-appearing or diaphoretic.   HENT:      Head: Normocephalic and atraumatic.      Comments: No tenderness to palpation of frontal or maxillary sinuses     Right Ear: Ear canal and external ear normal. Tympanic membrane is not perforated, erythematous, retracted or bulging.      Left Ear: Ear canal and external ear normal. Tympanic membrane is not perforated, erythematous, retracted or bulging.      Ears:      Comments: Some fluid is visible behind L TM     Nose: No congestion or rhinorrhea.      Mouth/Throat:      Mouth: Mucous membranes are moist.      Pharynx: Uvula midline. No pharyngeal swelling, oropharyngeal exudate or posterior oropharyngeal erythema.      Tonsils: 0 on the right. 0 on the left.      Comments: Drainage visible in pharynx  Eyes:      General:         Right eye: No discharge.         Left eye: No discharge.      Extraocular Movements: Extraocular movements intact.   Neck:      Comments: Neck ROM is not painful  Cardiovascular:      Rate and Rhythm: Normal rate and regular rhythm.      Heart sounds: " No murmur heard.    No friction rub. No gallop.   Pulmonary:      Effort: No respiratory distress.      Breath sounds: No wheezing, rhonchi or rales.   Musculoskeletal:      Cervical back: Normal range of motion. No rigidity.   Lymphadenopathy:      Cervical: No cervical adenopathy.   Skin:     General: Skin is warm.   Neurological:      Mental Status: He is alert.      Gait: Gait normal.   Psychiatric:         Mood and Affect: Mood normal.         Thought Content: Thought content normal.          Assessment and Plan     1. Nasal congestion  -He is negative for COVID-19 and influenza A and B.  -Prescription for nasal saline has been sent to help with congestion and fluid on left ear.  -Continue on daily Zyrtec.  -A short course of steroids have been sent to the pharmacy to help dry up secretions and provide symptom relief.  His last hemoglobin A1c was 6.8.  -Other supportive care measures include increasing hydration, Vicks vapor rub, avoiding known allergens, throat lozenges, nasal saline rinses.  - POCT SARS-CoV-2 Antigen GAYE + Flu  - predniSONE (DELTASONE) 10 MG tablet; Take 4 tablets by mouth Daily for 5 days.  Dispense: 20 tablet; Refill: 0  - sodium chloride (Ocean Nasal Spray) 0.65 % nasal spray; 1 spray into the nostril(s) as directed by provider As Needed for Congestion.  Dispense: 15 mL; Refill: 0    2. Acute cough  -Chest x-ray has been ordered for further evaluation.  -He does have a history of asthma.  A prescription for albuterol inhaler has been sent to the pharmacy.  I encouraged him to use it 4 times daily while he is sick to help alleviate chest tightness and cough.  -If his symptoms worsen, sputum production increases, he develops fever or chills, he develops shortness of air or chest pain, I encouraged him to return to care.  - XR Chest PA & Lateral; Future  - albuterol sulfate  (90 Base) MCG/ACT inhaler; Inhale 2 puffs Every 4 (Four) Hours As Needed for Wheezing (Chest tightness).   Dispense: 6.7 g; Refill: 0       Follow Up  Return if symptoms worsen or fail to improve.    Ekaterina Ledezma PA-C  [facility]

## 2023-01-22 DIAGNOSIS — E11.9 TYPE 2 DIABETES MELLITUS WITHOUT COMPLICATIONS: ICD-10-CM

## 2023-01-22 RX ORDER — BENAZEPRIL HYDROCHLORIDE 20 MG/1
TABLET ORAL
Qty: 90 TABLET | Refills: 3 | Status: SHIPPED | OUTPATIENT
Start: 2023-01-22

## 2023-01-24 DIAGNOSIS — R31.0 GROSS HEMATURIA: Primary | ICD-10-CM

## 2023-01-24 DIAGNOSIS — R05.1 ACUTE COUGH: Primary | ICD-10-CM

## 2023-01-24 DIAGNOSIS — N20.2 CALCULUS OF KIDNEY WITH CALCULUS OF URETER: ICD-10-CM

## 2023-01-24 RX ORDER — DOXYCYCLINE HYCLATE 100 MG/1
100 CAPSULE ORAL 2 TIMES DAILY
Qty: 20 CAPSULE | Refills: 0 | Status: SHIPPED | OUTPATIENT
Start: 2023-01-24 | End: 2023-03-07

## 2023-01-31 ENCOUNTER — OFFICE VISIT (OUTPATIENT)
Dept: UROLOGY | Facility: CLINIC | Age: 43
End: 2023-01-31
Payer: COMMERCIAL

## 2023-01-31 VITALS
OXYGEN SATURATION: 97 % | TEMPERATURE: 97.4 F | HEIGHT: 78 IN | HEART RATE: 74 BPM | BODY MASS INDEX: 36.45 KG/M2 | DIASTOLIC BLOOD PRESSURE: 78 MMHG | WEIGHT: 315 LBS | SYSTOLIC BLOOD PRESSURE: 144 MMHG

## 2023-01-31 DIAGNOSIS — N20.0 KIDNEY STONE: Primary | ICD-10-CM

## 2023-01-31 LAB
BILIRUB BLD-MCNC: NEGATIVE MG/DL
CLARITY, POC: CLEAR
COLOR UR: YELLOW
EXPIRATION DATE: ABNORMAL
GLUCOSE UR STRIP-MCNC: ABNORMAL MG/DL
KETONES UR QL: NEGATIVE
LEUKOCYTE EST, POC: NEGATIVE
Lab: ABNORMAL
NITRITE UR-MCNC: NEGATIVE MG/ML
PH UR: 5 [PH] (ref 5–8)
PROT UR STRIP-MCNC: ABNORMAL MG/DL
RBC # UR STRIP: NEGATIVE /UL
SP GR UR: 1.03 (ref 1–1.03)
UROBILINOGEN UR QL: ABNORMAL

## 2023-01-31 PROCEDURE — 99213 OFFICE O/P EST LOW 20 MIN: CPT | Performed by: PHYSICIAN ASSISTANT

## 2023-01-31 PROCEDURE — 81003 URINALYSIS AUTO W/O SCOPE: CPT | Performed by: PHYSICIAN ASSISTANT

## 2023-01-31 RX ORDER — ROSUVASTATIN CALCIUM 20 MG/1
TABLET, COATED ORAL
COMMUNITY
Start: 2023-01-22 | End: 2023-02-27

## 2023-01-31 NOTE — PROGRESS NOTES
Chief Complaint  Kidney Stone    Rigoberto Brand*    HPI  Mr. Russell is a 42 y.o. male with hx of gastric sleeve in 12/21 who presents for further management of nephrolithiasis.    He presented to [facility] on 12/03/22 and was diagnosed with a 5mm right ureteral stone. He presents today for follow up.  He stopped experiencing pain on 12/07/22.  No fever, chills, nausea, vomiting.  No dysuria.    Stone related history:  Family history of kidney stones  yes, mother and father  Renal disease or anatomic abnormality: no  Malabsorptive disease or gastric bypass: yes  Frequent UTI's    no  Parathyroid disease    no    Dietary Considerations  Soda - 0 per day  Fast food - 2-3 per week  Water - 120 oz per day      Past Medical History  Past Medical History:   Diagnosis Date   • Acute bronchitis    • Adult-onset obesity    • Agitated    • Allergic rhinitis    • Anxiety and depression    • Asthma    • Cellulitis    • Chronic GERD    • Diabetes mellitus (HCC)    • Diarrhea    • Dyspnea    • Exposure to influenza    • GERD (gastroesophageal reflux disease)    • Gonadal dysgenesis    • Headache    • Hemorrhoid    • Hyperlipidemia    • Hypersomnia with sleep apnea    • Hypertension, benign    • Leg skin lesion, right    • Mixed dyslipidemia    • Morbid obesity (HCC)    • Pain in joint involving shoulder region    • Pain in left lower leg    • Pain of left shoulder region    • Panniculitis    • Pneumococcal vaccination declined    • Pneumonia    • Primary hypertension    • Scabies    • Sinusitis, acute    • Spindle cell type atypical fibroxanthoma    • Sprain of ankle, sequela        Past Surgical History  Past Surgical History:   Procedure Laterality Date   • EAR TUBES     • GASTRIC SLEEVE LAPAROSCOPIC     • HIP SURGERY Right     HIP SLIPPED EPHISLEA   • SINUS SURGERY     • TONSILLECTOMY         Medications    Current Outpatient Medications:   •  albuterol sulfate  (90 Base) MCG/ACT inhaler, Inhale 2  puffs Every 4 (Four) Hours As Needed for Wheezing (Chest tightness)., Disp: 6.7 g, Rfl: 0  •  benazepril (LOTENSIN) 20 MG tablet, Take 1 tablet by mouth daily, Disp: 90 tablet, Rfl: 3  •  cetirizine (ZyrTEC) 10 MG chewable tablet, 10 mg., Disp: , Rfl:   •  doxycycline (VIBRAMYCIN) 100 MG capsule, Take 1 capsule by mouth 2 (Two) Times a Day., Disp: 20 capsule, Rfl: 0  •  fenofibrate micronized (LOFIBRA) 134 MG capsule, 134 mg., Disp: , Rfl:   •  metoprolol succinate XL (TOPROL-XL) 50 MG 24 hr tablet, Take 50 mg by mouth Daily., Disp: , Rfl:   •  omeprazole (priLOSEC) 40 MG capsule, Take 1 capsule by mouth Daily., Disp: 90 capsule, Rfl: 3  •  rosuvastatin (CRESTOR) 20 MG tablet, , Disp: , Rfl:   •  sertraline (ZOLOFT) 100 MG tablet, 200 mg., Disp: , Rfl:   •  amLODIPine (NORVASC) 10 MG tablet, Take 10 mg by mouth Daily., Disp: , Rfl:   •  rosuvastatin (CRESTOR) 40 MG tablet, rosuvastatin 40 mg tablet  TAKE ONE TABLET BY MOUTH EVERY night, Disp: , Rfl:   •  tamsulosin (FLOMAX) 0.4 MG capsule 24 hr capsule, Take 1 capsule by mouth 2 (Two) Times a Day., Disp: 60 capsule, Rfl: 11    Allergies  Allergies   Allergen Reactions   • Clarithromycin Hives and Anaphylaxis   • Ceftazidime Hives   • Cephalosporins Provider Review Needed   • Codeine Hives   • Penicillins Hives   • Unasyn [Ampicillin-Sulbactam Sodium] Hives   • Amoxicillin Unknown - Low Severity   • Sulbactam Unknown - Low Severity       Social History  Social History     Socioeconomic History   • Marital status:    Tobacco Use   • Smoking status: Former     Packs/day: 0.50     Types: Cigarettes     Start date: 2022     Quit date: 2023     Years since quittin.0     Passive exposure: Past   • Smokeless tobacco: Never   Vaping Use   • Vaping Use: Never used   Substance and Sexual Activity   • Alcohol use: Not Currently   • Drug use: Never   • Sexual activity: Defer       Family History  Family History   Problem Relation Age of Onset   •  "Hypertension Mother    • Hyperlipidemia Mother    • Diabetes Mother    • Hypertension Father    • Bradycardia Sister    • Breast cancer Other    • Diabetes Other    • Hypertension Other    • Lung cancer Other    • Heart attack Other    • Skin cancer Other          Physical Exam  Visit Vitals  /78   Pulse 74   Temp 97.4 °F (36.3 °C)   Ht 198.1 cm (78\")   Wt (!) 152 kg (335 lb)   SpO2 97%   BMI 38.71 kg/m²     : Circumcised penis.  Bilateral testicles descended and nontender, smooth surfaces.  No epididymal head cysts.  Small, approximately 5 mm cystic structure is present on the right epididymal tail.      Labs  Lab Results   Component Value Date    GLUCOSE 111 (H) 12/07/2022    BUN 18 12/07/2022    CREATININE 0.81 12/07/2022    EGFRIFNONA 90 01/13/2022    BCR 22.2 12/07/2022    K 4.5 12/07/2022    CO2 27.1 12/07/2022    CALCIUM 9.4 12/07/2022    ALBUMIN 4.70 12/07/2022    AST 27 12/07/2022    ALT 26 12/07/2022       Lab Results   Component Value Date    WBC 6.73 12/07/2022    HGB 15.3 12/07/2022    HCT 44.5 12/07/2022    MCV 86.4 12/07/2022     12/07/2022       Lab Results   Component Value Date    CALCIUM 9.4 12/07/2022       Brief Urine Lab Results  (Last result in the past 365 days)      Color   Clarity   Blood   Leuk Est   Nitrite   Protein   CREAT   Urine HCG        01/31/23 1459 Yellow   Clear   Negative   Negative   Negative   Trace                   Assessment  Mr. Russell is a 42 y.o. male with possible 5 mm distal right ureteral stone.    He has been symptom-free for almost 8 weeks, but cannot definitively say that he passed a stone.  His UA today is benign, without blood.  I did advise him that it is possible he passed the stone without feeling it.  Considering he has been undergoing trial of passage, actually since September when he first became symptomatic and was diagnosed with a stone initially, he would like to undergo definitive surgical management if the stone is still present with " repeat imaging.    We have discussed ureteroscopy with laser lithotripsy as a surgical management option.  We also discussed leaving the stent on a string as the patient previously underwent cystoscopy in childhood and would not be eager to have this done while awake.    Despite his gastric sleeve in 2021, he actually drinks at least 100 ounces of water daily and is relatively low risk aside from family history.     exam is performed at the end of this visit with the patient reporting a remote history of cystic structure adjacent to the testicles.  I suspected it was likely an epididymal head cyst, but he was not certain.  Physical exam reveals a subcentimeter mildly tender nodular structure in the tail of the right epididymis.  It is not bothersome to him at this time and he denies having any changes in the structure for several years    Plan  1.  Obtain CT stone    Follow-up by phone in 2 weeks to discuss stone passage, prevention versus surgery

## 2023-02-14 ENCOUNTER — OFFICE VISIT (OUTPATIENT)
Dept: UROLOGY | Facility: CLINIC | Age: 43
End: 2023-02-14
Payer: COMMERCIAL

## 2023-02-14 ENCOUNTER — HOSPITAL ENCOUNTER (OUTPATIENT)
Dept: CT IMAGING | Facility: HOSPITAL | Age: 43
Discharge: HOME | End: 2023-02-14
Admitting: PHYSICIAN ASSISTANT
Payer: COMMERCIAL

## 2023-02-14 DIAGNOSIS — N20.0 KIDNEY STONES: Primary | ICD-10-CM

## 2023-02-14 DIAGNOSIS — N20.0 KIDNEY STONE: Primary | ICD-10-CM

## 2023-02-14 DIAGNOSIS — N20.0 KIDNEY STONE: ICD-10-CM

## 2023-02-14 PROCEDURE — 99441 PR PHYS/QHP TELEPHONE EVALUATION 5-10 MIN: CPT | Performed by: PHYSICIAN ASSISTANT

## 2023-02-14 PROCEDURE — 74176 CT ABD & PELVIS W/O CONTRAST: CPT

## 2023-02-14 RX ORDER — ACETAMINOPHEN 500 MG
1000 TABLET ORAL EVERY 6 HOURS PRN
Qty: 56 TABLET | Refills: 0 | Status: SHIPPED | OUTPATIENT
Start: 2023-02-14 | End: 2023-02-21

## 2023-02-14 RX ORDER — OXYCODONE HYDROCHLORIDE 5 MG/1
5 TABLET ORAL EVERY 8 HOURS PRN
Qty: 6 TABLET | Refills: 0 | Status: SHIPPED | OUTPATIENT
Start: 2023-02-14 | End: 2023-02-21

## 2023-02-14 NOTE — PROGRESS NOTES
Patient is known patient to our practice having retracted breakthrough pain with 5 mm ureteral stone, he is consented for surgery and surgery will be scheduled.  Patient has not tolerated oxycodone for pain in the past.  Yousuf reviewed prior to prescription sent Familia MCFARLAND in our office has discussed with patient in this emergent situation I will send in medication to get patient through tonight for pain management

## 2023-02-14 NOTE — PROGRESS NOTES
Chief Complaint   Patient presents with   • Nephrolithiasis        HPI  Mr. Russell is a 42 y.o. male with hx of gastric sleeve in 12/21, 5 mm right ureteral stone undergoing trial of passage who consented to receive follow-up care by phone.     At this visit, started having increasing pain and LUTS this morning and does not believe he has passed a stone.    Past Medical History:   Diagnosis Date   • Acute bronchitis    • Adult-onset obesity    • Agitated    • Allergic rhinitis    • Anxiety and depression    • Asthma    • Cellulitis    • Chronic GERD    • Diabetes mellitus (HCC)    • Diarrhea    • Dyspnea    • Exposure to influenza    • GERD (gastroesophageal reflux disease)    • Gonadal dysgenesis    • Headache    • Hemorrhoid    • Hyperlipidemia    • Hypersomnia with sleep apnea    • Hypertension, benign    • Leg skin lesion, right    • Mixed dyslipidemia    • Morbid obesity (HCC)    • Pain in joint involving shoulder region    • Pain in left lower leg    • Pain of left shoulder region    • Panniculitis    • Pneumococcal vaccination declined    • Pneumonia    • Primary hypertension    • Scabies    • Sinusitis, acute    • Spindle cell type atypical fibroxanthoma    • Sprain of ankle, sequela        Past Surgical History:   Procedure Laterality Date   • EAR TUBES     • GASTRIC SLEEVE LAPAROSCOPIC     • HIP SURGERY Right     HIP SLIPPED EPHISLEA   • SINUS SURGERY     • TONSILLECTOMY           Current Outpatient Medications:   •  albuterol sulfate  (90 Base) MCG/ACT inhaler, Inhale 2 puffs Every 4 (Four) Hours As Needed for Wheezing (Chest tightness)., Disp: 6.7 g, Rfl: 0  •  amLODIPine (NORVASC) 10 MG tablet, Take 10 mg by mouth Daily., Disp: , Rfl:   •  benazepril (LOTENSIN) 20 MG tablet, Take 1 tablet by mouth daily, Disp: 90 tablet, Rfl: 3  •  cetirizine (ZyrTEC) 10 MG chewable tablet, 10 mg., Disp: , Rfl:   •  doxycycline (VIBRAMYCIN) 100 MG capsule, Take 1 capsule by mouth 2 (Two) Times a Day., Disp: 20  capsule, Rfl: 0  •  fenofibrate micronized (LOFIBRA) 134 MG capsule, 134 mg., Disp: , Rfl:   •  metoprolol succinate XL (TOPROL-XL) 50 MG 24 hr tablet, Take 50 mg by mouth Daily., Disp: , Rfl:   •  omeprazole (priLOSEC) 40 MG capsule, Take 1 capsule by mouth Daily., Disp: 90 capsule, Rfl: 3  •  rosuvastatin (CRESTOR) 20 MG tablet, , Disp: , Rfl:   •  rosuvastatin (CRESTOR) 40 MG tablet, rosuvastatin 40 mg tablet  TAKE ONE TABLET BY MOUTH EVERY night, Disp: , Rfl:   •  sertraline (ZOLOFT) 100 MG tablet, 200 mg., Disp: , Rfl:   •  tamsulosin (FLOMAX) 0.4 MG capsule 24 hr capsule, Take 1 capsule by mouth 2 (Two) Times a Day., Disp: 60 capsule, Rfl: 11     Physical Exam  There were no vitals taken for this visit.    Labs  Brief Urine Lab Results  (Last result in the past 365 days)      Color   Clarity   Blood   Leuk Est   Nitrite   Protein   CREAT   Urine HCG        01/31/23 1459 Yellow   Clear   Negative   Negative   Negative   Trace                 Lab Results   Component Value Date    GLUCOSE 111 (H) 12/07/2022    CALCIUM 9.4 12/07/2022     12/07/2022    K 4.5 12/07/2022    CO2 27.1 12/07/2022     12/07/2022    BUN 18 12/07/2022    CREATININE 0.81 12/07/2022    EGFRIFNONA 90 01/13/2022    BCR 22.2 12/07/2022    ANIONGAP 11.9 12/07/2022       Lab Results   Component Value Date    WBC 6.73 12/07/2022    HGB 15.3 12/07/2022    HCT 44.5 12/07/2022    MCV 86.4 12/07/2022     12/07/2022         Radiographic Studies  CT Abdomen Pelvis Stone Protocol    Result Date: 2/14/2023  Impression: 1. There is a 4 to 5 mm calculus in the distal right ureter with minimal upstream obstructive change in the right kidney. 2. Hepatosplenomegaly. 3. Additional findings as given above. Electronically Signed: Toni Lock  2/14/2023 4:13 PM EST  Workstation ID: [id number]      Assessment  42 y.o. male with hx of gastric sleeve in 12/21 and nephrolithiasis.     He has been undergoing trial of passage since December 3.  Repeat  CT today reveals unchanged position of distal right ureteral stone.  He is having return of symptoms today as well.  He is consented and ready to undergo ureteroscopy with laser lithotripsy and stent.    Plan  1.  Continue BID flomax  2.  Diclofenac and maximum Tylenol for pain  3.  Oxycodone for severe breakthrough pain  4.  Consented for ureteroscopy with laser lithotripsy, schedule per Dr. Thompson    Total time 5 minutes

## 2023-02-20 DIAGNOSIS — R30.0 DYSURIA: Primary | ICD-10-CM

## 2023-02-20 RX ORDER — LEVOFLOXACIN 750 MG/1
750 TABLET ORAL DAILY
Qty: 7 TABLET | Refills: 0 | Status: SHIPPED | OUTPATIENT
Start: 2023-02-20 | End: 2023-02-27

## 2023-02-21 ENCOUNTER — OUTSIDE FACILITY SERVICE (OUTPATIENT)
Dept: UROLOGY | Facility: CLINIC | Age: 43
End: 2023-02-21
Payer: COMMERCIAL

## 2023-02-21 ENCOUNTER — TELEPHONE (OUTPATIENT)
Dept: UROLOGY | Facility: CLINIC | Age: 43
End: 2023-02-21

## 2023-02-21 DIAGNOSIS — N20.0 KIDNEY STONES: Primary | ICD-10-CM

## 2023-02-21 PROCEDURE — 52356 CYSTO/URETERO W/LITHOTRIPSY: CPT | Performed by: UROLOGY

## 2023-02-21 PROCEDURE — 74420 UROGRAPHY RTRGR +-KUB: CPT | Performed by: UROLOGY

## 2023-02-21 RX ORDER — PHENAZOPYRIDINE HYDROCHLORIDE 100 MG/1
100 TABLET, FILM COATED ORAL 3 TIMES DAILY PRN
Qty: 21 TABLET | Refills: 0 | Status: SHIPPED | OUTPATIENT
Start: 2023-02-21 | End: 2023-03-07

## 2023-02-21 RX ORDER — ACETAMINOPHEN 500 MG
1000 TABLET ORAL EVERY 6 HOURS
Qty: 30 TABLET | Refills: 0 | Status: SHIPPED | OUTPATIENT
Start: 2023-02-21 | End: 2023-02-24

## 2023-02-21 RX ORDER — OXYCODONE HYDROCHLORIDE 5 MG/1
5 TABLET ORAL EVERY 6 HOURS PRN
Qty: 5 TABLET | Refills: 0 | Status: SHIPPED | OUTPATIENT
Start: 2023-02-21 | End: 2023-03-07

## 2023-02-21 RX ORDER — OXYBUTYNIN CHLORIDE 10 MG/1
10 TABLET, EXTENDED RELEASE ORAL DAILY PRN
Qty: 10 TABLET | Refills: 0 | Status: SHIPPED | OUTPATIENT
Start: 2023-02-21 | End: 2023-03-07

## 2023-02-21 RX ORDER — DOCUSATE SODIUM 100 MG/1
100 CAPSULE, LIQUID FILLED ORAL 2 TIMES DAILY
Qty: 15 CAPSULE | Refills: 1 | Status: SHIPPED | OUTPATIENT
Start: 2023-02-21 | End: 2023-03-07

## 2023-02-21 RX ORDER — TAMSULOSIN HYDROCHLORIDE 0.4 MG/1
1 CAPSULE ORAL NIGHTLY
Qty: 10 CAPSULE | Refills: 0 | Status: SHIPPED | OUTPATIENT
Start: 2023-02-21 | End: 2023-03-07

## 2023-02-21 NOTE — TELEPHONE ENCOUNTER
Caller: [facility]    [facility]hip: [facility]    PATIENT'S CALLback number: [phone]    What orders are you requesting (i.e. lab or imaging): RENAL US    In what timeframe would the patient need to come in: PRIOR TO 8WK APPT 4/17/23    Where will you receive your lab/imaging services: [facility]    Additional notes: [facility] CALLED AND SCHEDULED 8 WK POST-OP AND THEIR INSTRUCTIONS STATE PT SHOULD HAVE A RENAL US PRIOR. PLEASE OPEN ORDER OR SCHEDULE AS NEEDED.

## 2023-02-27 PROBLEM — G47.33 OSA (OBSTRUCTIVE SLEEP APNEA): Status: ACTIVE | Noted: 2023-02-27

## 2023-03-07 ENCOUNTER — OFFICE VISIT (OUTPATIENT)
Dept: FAMILY MEDICINE CLINIC | Facility: CLINIC | Age: 43
End: 2023-03-07
Payer: COMMERCIAL

## 2023-03-07 ENCOUNTER — PRIOR AUTHORIZATION (OUTPATIENT)
Dept: FAMILY MEDICINE CLINIC | Facility: CLINIC | Age: 43
End: 2023-03-07
Payer: COMMERCIAL

## 2023-03-07 VITALS
SYSTOLIC BLOOD PRESSURE: 128 MMHG | DIASTOLIC BLOOD PRESSURE: 82 MMHG | TEMPERATURE: 98 F | HEIGHT: 78 IN | HEART RATE: 82 BPM | BODY MASS INDEX: 36.45 KG/M2 | RESPIRATION RATE: 17 BRPM | WEIGHT: 315 LBS | OXYGEN SATURATION: 98 %

## 2023-03-07 DIAGNOSIS — Z00.00 WELL ADULT EXAM: Primary | ICD-10-CM

## 2023-03-07 DIAGNOSIS — I10 PRIMARY HYPERTENSION: ICD-10-CM

## 2023-03-07 DIAGNOSIS — E78.2 MIXED HYPERLIPIDEMIA: ICD-10-CM

## 2023-03-07 DIAGNOSIS — E66.01 MORBID (SEVERE) OBESITY DUE TO EXCESS CALORIES: ICD-10-CM

## 2023-03-07 DIAGNOSIS — E11.620 TYPE 2 DIABETES MELLITUS WITH DIABETIC DERMATITIS, WITHOUT LONG-TERM CURRENT USE OF INSULIN: ICD-10-CM

## 2023-03-07 DIAGNOSIS — K21.9 CHRONIC GERD: ICD-10-CM

## 2023-03-07 DIAGNOSIS — Z23 NEED FOR VACCINATION AGAINST STREPTOCOCCUS PNEUMONIAE: ICD-10-CM

## 2023-03-07 DIAGNOSIS — F41.9 ANXIETY AND DEPRESSION: ICD-10-CM

## 2023-03-07 DIAGNOSIS — F32.A ANXIETY AND DEPRESSION: ICD-10-CM

## 2023-03-07 LAB
ALBUMIN SERPL-MCNC: 4.6 G/DL (ref 3.5–5.2)
ALBUMIN/GLOB SERPL: 1.8 G/DL
ALP SERPL-CCNC: 71 U/L (ref 39–117)
ALT SERPL W P-5'-P-CCNC: 28 U/L (ref 1–41)
ANION GAP SERPL CALCULATED.3IONS-SCNC: 8.7 MMOL/L (ref 5–15)
AST SERPL-CCNC: 27 U/L (ref 1–40)
BILIRUB SERPL-MCNC: 0.5 MG/DL (ref 0–1.2)
BUN SERPL-MCNC: 20 MG/DL (ref 6–20)
BUN/CREAT SERPL: 25.3 (ref 7–25)
CALCIUM SPEC-SCNC: 9.3 MG/DL (ref 8.6–10.5)
CHLORIDE SERPL-SCNC: 103 MMOL/L (ref 98–107)
CHOLEST SERPL-MCNC: 142 MG/DL (ref 0–200)
CO2 SERPL-SCNC: 30.3 MMOL/L (ref 22–29)
CREAT SERPL-MCNC: 0.79 MG/DL (ref 0.76–1.27)
EGFRCR SERPLBLD CKD-EPI 2021: 115.2 ML/MIN/1.73
GLOBULIN UR ELPH-MCNC: 2.6 GM/DL
GLUCOSE SERPL-MCNC: 130 MG/DL (ref 65–99)
HDLC SERPL-MCNC: 27 MG/DL (ref 40–60)
LDLC SERPL CALC-MCNC: 88 MG/DL (ref 0–100)
LDLC/HDLC SERPL: 3.13 {RATIO}
POTASSIUM SERPL-SCNC: 4.5 MMOL/L (ref 3.5–5.2)
PROT SERPL-MCNC: 7.2 G/DL (ref 6–8.5)
SODIUM SERPL-SCNC: 142 MMOL/L (ref 136–145)
TRIGL SERPL-MCNC: 153 MG/DL (ref 0–150)
VLDLC SERPL-MCNC: 27 MG/DL (ref 5–40)

## 2023-03-07 PROCEDURE — 80053 COMPREHEN METABOLIC PANEL: CPT | Performed by: FAMILY MEDICINE

## 2023-03-07 PROCEDURE — 83036 HEMOGLOBIN GLYCOSYLATED A1C: CPT | Performed by: FAMILY MEDICINE

## 2023-03-07 PROCEDURE — 99396 PREV VISIT EST AGE 40-64: CPT | Performed by: FAMILY MEDICINE

## 2023-03-07 PROCEDURE — 90471 IMMUNIZATION ADMIN: CPT | Performed by: FAMILY MEDICINE

## 2023-03-07 PROCEDURE — 90677 PCV20 VACCINE IM: CPT | Performed by: FAMILY MEDICINE

## 2023-03-07 PROCEDURE — 80061 LIPID PANEL: CPT | Performed by: FAMILY MEDICINE

## 2023-03-07 PROCEDURE — 99214 OFFICE O/P EST MOD 30 MIN: CPT | Performed by: FAMILY MEDICINE

## 2023-03-07 RX ORDER — OMEPRAZOLE 40 MG/1
40 CAPSULE, DELAYED RELEASE ORAL 2 TIMES DAILY
Qty: 180 CAPSULE | Refills: 3 | Status: SHIPPED | OUTPATIENT
Start: 2023-03-07

## 2023-03-07 RX ORDER — ESCITALOPRAM OXALATE 10 MG/1
10 TABLET ORAL DAILY
Qty: 90 TABLET | Refills: 3 | Status: SHIPPED | OUTPATIENT
Start: 2023-03-07

## 2023-03-07 NOTE — PROGRESS NOTES
Male Physical Note      Date: 2023   Patient Name: Dom Russell  : 1980   MRN: [id number]     Chief Complaint:    Chief Complaint   Patient presents with   • Hypertension     fu       History of Present Illness: Dom Russell is a 42 y.o. male who is here today for their annual health maintenance and physical.  Patient been doing well 12 since last being seen without any issues noted.  He does have occasional back pain with arthralgias but this does appear to be intermittent without any radiculopathy.  Patient has had some reflux symptoms here recently and does not believe that the PPI is as effective as it was in the past.  He has not lost any more weight and does continue to have some issues with changes in his lifestyle.  He has otherwise been taking his medications as prescribed without side effects.  He denies any change in activity, appetite or sleep.  He has no other cardiovascular, respiratory, gastrointestinal, urologic or neurologic complaints.      Subjective      Review of Systems:   Review of Systems   Constitutional: Negative for activity change, appetite change and fatigue.   Respiratory: Negative for cough, shortness of breath and wheezing.    Cardiovascular: Negative for chest pain, palpitations and leg swelling.   Gastrointestinal: Positive for GERD. Negative for abdominal pain, blood in stool, constipation, diarrhea, nausea, vomiting and indigestion.   Genitourinary: Negative for dysuria, flank pain, frequency and urgency.   Musculoskeletal: Positive for arthralgias and back pain. Negative for gait problem and myalgias.   Neurological: Negative for dizziness, weakness and memory problem.   Psychiatric/Behavioral: Negative for sleep disturbance and depressed mood.       Past Medical History, Social History, Family History and Care Team were all reviewed with patient and updated as appropriate.     Medications:     Current Outpatient Medications:   •  albuterol sulfate  (90  Base) MCG/ACT inhaler, Inhale 2 puffs Every 4 (Four) Hours As Needed for Wheezing (Chest tightness)., Disp: 6.7 g, Rfl: 0  •  amLODIPine (NORVASC) 10 MG tablet, Take 1 tablet by mouth Daily., Disp: , Rfl:   •  benazepril (LOTENSIN) 20 MG tablet, Take 1 tablet by mouth daily, Disp: 90 tablet, Rfl: 3  •  cetirizine (ZyrTEC) 10 MG chewable tablet, 1 tablet., Disp: , Rfl:   •  fenofibrate micronized (LOFIBRA) 134 MG capsule, 1 capsule., Disp: , Rfl:   •  metoprolol succinate XL (TOPROL-XL) 50 MG 24 hr tablet, Take 1 tablet by mouth Daily., Disp: , Rfl:   •  omeprazole (priLOSEC) 40 MG capsule, Take 1 capsule by mouth 2 (Two) Times a Day., Disp: 180 capsule, Rfl: 3  •  rosuvastatin (CRESTOR) 40 MG tablet, rosuvastatin 40 mg tablet  TAKE ONE TABLET BY MOUTH EVERY night, Disp: , Rfl:   •  Dulaglutide 0.75 MG/0.5ML solution pen-injector, Inject 0.75 mg under the skin into the appropriate area as directed 1 (One) Time Per Week., Disp: 2 mL, Rfl: 0    Allergies:   Allergies   Allergen Reactions   • Clarithromycin Hives and Anaphylaxis   • Ceftazidime Hives   • Cephalosporins Provider Review Needed   • Codeine Hives   • Penicillins Hives   • Unasyn [Ampicillin-Sulbactam Sodium] Hives   • Amoxicillin Unknown - Low Severity   • Sulbactam Unknown - Low Severity       Immunizations:  Health Maintenance Summary          Overdue - DIABETIC EYE EXAM (Yearly) Overdue since 1/13/2022 12/17/2018  RETINAVUE - DIABETIC EYE EXAM          Postponed - COVID-19 Vaccine (1) Postponed until 3/26/2024    03/07/2023  Postponed until 3/26/2024 by Rigoberto Brand MD (Pending event - Advised.)    12/07/2022  Postponed until 12/9/2022 by Rigoberto Brand MD (Patient Refused)          Ordered - HEMOGLOBIN A1C (Every 6 Months) Ordered on 3/7/2023    12/07/2022  Hemoglobin A1C component of Hemoglobin A1c    04/25/2022  Done    02/15/2022  Outside Procedure: CHG GLYCOSYLATED HEMOGLOBIN TEST    11/23/2020  Hemoglobin A1C component  of Hemoglobin A1c          DIABETIC FOOT EXAM (Yearly) Next due on 12/7/2023 12/07/2022  Done          Ordered - LIPID PANEL (Yearly) Ordered on 3/7/2023    12/07/2022  Lipid Panel    04/25/2022  Done    11/23/2020  Lipid Panel    11/23/2020  LDL Cholesterol, Direct          URINE MICROALBUMIN (Yearly) Next due on 12/7/2023 12/07/2022  Microalbumin, Urine component of MicroAlbumin, Urine, Random - Urine, Clean Catch          ANNUAL PHYSICAL (Yearly) Next due on 3/7/2024    03/07/2023  Done          TDAP/TD VACCINES (3 - Td or Tdap) Next due on 12/7/2032 12/07/2022  Imm Admin: Tdap    08/14/1997  Imm Admin: Td          Hepatitis B (Series Information) Completed    11/09/2020  Imm Admin: Hepatitis B    08/31/2020  Imm Admin: Hepatitis B    08/29/2006  Imm Admin: Hepatitis B    06/27/2006  Imm Admin: Hepatitis B    02/14/2006  Imm Admin: Hepatitis B          INFLUENZA VACCINE  Completed    10/03/2022  Imm Admin: Flu Vaccine Quad PF >36MO    10/18/2021  Imm Admin: Flu Vaccine Quad PF >36MO    10/01/2019  Imm Admin: Fluzone Quad >6mos (Multi-dose)    11/30/2018  Imm Admin: Fluzone Quad >6mos (Multi-dose)    10/30/2017  Imm Admin: Fluzone High Dose =>65 Years ([employer] ONLY)          HEPATITIS C SCREENING  Completed    12/07/2022  Hepatitis C Antibody          Pneumococcal Vaccine 0-64 (Series Information) Completed    03/07/2023  Imm Admin: Pneumococcal Conjugate 20-Valent (PCV20)    06/06/2019  Imm Admin: Pneumococcal Polysaccharide (PPSV23)    12/20/2011  Imm Admin: Pneumococcal Polysaccharide (PPSV23)                Orders Placed This Encounter   Procedures   • Pneumococcal Conjugate Vaccine 20-Valent All       Colorectal Screening:   Deferred.  Last Completed Colonoscopy     This patient has no relevant Health Maintenance data.        CT for Smoker (Age 50-80, 20pk yr within last 15 years): Deferred  Bone Density/DEXA (high risk): Deferred.  Hep C (Age 18-79 once): Ordered  HIV (Age 15-65 once) :  "Deferred.  PSA (Over age 50, C Level Recommendation): Deferred.  US Aorta (For male smokers, age 65): Deferred.  A1c:   Hemoglobin A1C   Date Value Ref Range Status   12/07/2022 6.80 (H) 4.80 - 5.60 % Final     Lipid panel: No results found for: LABLIPI    The 10-year ASCVD risk score (Francisca SANTACRUZ, et al., 2019) is: 3%    Values used to calculate the score:      Age: 42 years      Sex: Male      Is Non- : No      Diabetic: Yes      Tobacco smoker: No      Systolic Blood Pressure: 128 mmHg      Is BP treated: Yes      HDL Cholesterol: 26 mg/dL      Total Cholesterol: 137 mg/dL    Dermatology: Advised if necessary.  Ophthalmologist: Up-to-date  Dentist: Up-to-date.    Tobacco Use: Medium Risk   • Smoking Tobacco Use: Former   • Smokeless Tobacco Use: Never   • Passive Exposure: Past       Social History     Substance and Sexual Activity   Alcohol Use Not Currently        Social History     Substance and Sexual Activity   Drug Use Never        Diet/Physical activity: Well-balanced diet/daily exercise.    Sexual health: No issues at present time.    PHQ-2 Depression Screening  PHQ-9 Total Score:    0    Measures:   Advanced Care Planning:   Patient does not have an advance directive, information provided.    Smoking Cessation:   Non-smoker.     Objective     Physical Exam:  Vital Signs:   Vitals:    03/07/23 0753   BP: 128/82   BP Location: Left arm   Patient Position: Sitting   Cuff Size: Large Adult   Pulse: 82   Resp: 17   Temp: 98 °F (36.7 °C)   SpO2: 98%   Weight: (!) 156 kg (343 lb)   Height: 198.1 cm (78\")     Body mass index is 39.64 kg/m².     Physical Exam  Vitals and nursing note reviewed.   Constitutional:       Appearance: Normal appearance.   HENT:      Head: Normocephalic and atraumatic.      Nose: Nose normal.      Mouth/Throat:      Pharynx: Oropharynx is clear.   Eyes:      Extraocular Movements: Extraocular movements intact.      Pupils: Pupils are equal, round, and reactive to " light.   Neck:      Thyroid: No thyroid mass or thyromegaly.      Trachea: Trachea normal.   Cardiovascular:      Rate and Rhythm: Normal rate and regular rhythm.      Pulses: Normal pulses. No decreased pulses.      Heart sounds: Normal heart sounds.   Pulmonary:      Effort: Pulmonary effort is normal.      Breath sounds: Normal breath sounds.   Abdominal:      General: Abdomen is flat. Bowel sounds are normal.      Palpations: Abdomen is soft.      Tenderness: There is no abdominal tenderness.   Musculoskeletal:      Cervical back: Neck supple.      Right lower leg: No edema.      Left lower leg: No edema.   Lymphadenopathy:      Cervical: No cervical adenopathy.   Skin:     General: Skin is warm and dry.   Neurological:      General: No focal deficit present.      Mental Status: He is alert and oriented to person, place, and time.      Sensory: Sensation is intact.      Motor: Motor function is intact.      Coordination: Coordination is intact.   Psychiatric:         Attention and Perception: Attention normal.         Mood and Affect: Mood normal.         Speech: Speech normal.         Behavior: Behavior normal.          POCT Results (if applicable):     Procedures    Assessment / Plan      Assessment/Plan:   Diagnoses and all orders for this visit:    1. Well adult exam (Primary)   Patient did have a wellness exam performed today that did not reveal any abnormality.  We did discuss care gaps.  We also covered anticipatory guidance as well as safety issues.  We will continue to monitor current issues and will address accordingly.  He did not have any anxiety depression symptoms well at present time.  He does have occasional agitation.    2. Type 2 diabetes mellitus without complications, without long-term current use of insulin (HCC)   Patient has not lost any weight recently.  We have discussed options and since he does continue to have an elevated hemoglobin A1c we will go ahead and pursue with adding a GLP-1  agonist.  We will monitor his symptoms and will treat accordingly.  Patient understands the risk and benefits of the medication.  -     Hemoglobin A1c; Future  -     Hemoglobin A1c  -     Dulaglutide 0.75 MG/0.5ML solution pen-injector; Inject 0.75 mg under the skin into the appropriate area as directed 1 (One) Time Per Week.  Dispense: 2 mL; Refill: 0    3. Mixed hyperlipidemia   We will obtain lipid profile make adjustments based on these findings.  We will try to keep his LDL less than 70.  -     Lipid Panel; Future  -     Lipid Panel    4. Primary hypertension   Blood pressure doing well at present time.  No adjustments are necessary.  -     Comprehensive Metabolic Panel; Future  -     Comprehensive Metabolic Panel    5. Chronic GERD   Patient's reflux symptoms have worsened.  We will increase his omeprazole to twice daily.  If he has no improvement or develops alarm symptoms we will refer for an EGD.  -     omeprazole (priLOSEC) 40 MG capsule; Take 1 capsule by mouth 2 (Two) Times a Day.  Dispense: 180 capsule; Refill: 3    6. Need for vaccination against Streptococcus pneumoniae   Patient did receive a Prevnar vaccine today.  -     Pneumococcal Conjugate Vaccine 20-Valent All    7. Anxiety and depression   Patient has not been doing well with respect to his Zoloft.  We did look at his gene therapy and looks like Lexapro or Celexa could be the next best option.  We will try him on a course of Lexapro and see how he does.  He also is going to try to get a hold of a new counselor.    8. Morbid (severe) obesity due to excess calories (HCC)   Patient has had gastric surgery performed but he has plateaued for some time.  We have discussed not only options of diet and exercise program and a GLP-1 agonist and will monitor.  Patient understands risk and benefit of the medication and does not have a family history of thyroid cancer.       Healthcare Maintenance:  Counseling provided based on age appropriate USPSTF  guidelines.  Class 2 Severe Obesity (BMI >=35 and <=39.9). Obesity-related health conditions include the following: hypertension, diabetes mellitus, dyslipidemias and GERD. Obesity is unchanged. BMI is is above average; BMI management plan is completed. We discussed portion control, increasing exercise and pharmacologic options including GLP-1 agonist..    Dom Russell voices understanding and acceptance of this advice and will call back with any further questions or concerns. AVS with preventive healthcare tips printed for patient.     Follow Up:   Return in about 3 months (around 6/7/2023) for Recheck.    At [facility], we believe that sharing information builds trust and better relationships. You are receiving this note because you recently visited [facility]. It is possible you will see health information before a provider has talked with you about it. This kind of information can be easy to misunderstand. To help you fully understand what it means for your health, we urge you to discuss this note with your provider.    Rigoberto Brand MD  [facility]

## 2023-03-08 ENCOUNTER — TELEPHONE (OUTPATIENT)
Dept: FAMILY MEDICINE CLINIC | Facility: CLINIC | Age: 43
End: 2023-03-08

## 2023-03-08 LAB — HBA1C MFR BLD: 7 % (ref 4.8–5.6)

## 2023-03-08 NOTE — TELEPHONE ENCOUNTER
HE IS HAVING WORK PHYSICAL DONE TODAY AND IS NEEDING LAB RESULTS / DOESN'T WANT TO HAVE REPEATED IF NOT NEEDED   [phone]

## 2023-05-23 ENCOUNTER — OFFICE VISIT (OUTPATIENT)
Dept: FAMILY MEDICINE CLINIC | Facility: CLINIC | Age: 43
End: 2023-05-23
Payer: COMMERCIAL

## 2023-05-23 VITALS
WEIGHT: 315 LBS | DIASTOLIC BLOOD PRESSURE: 80 MMHG | SYSTOLIC BLOOD PRESSURE: 132 MMHG | BODY MASS INDEX: 36.45 KG/M2 | HEART RATE: 85 BPM | OXYGEN SATURATION: 96 % | TEMPERATURE: 99.1 F | HEIGHT: 78 IN

## 2023-05-23 DIAGNOSIS — L03.116 CELLULITIS OF LEFT LOWER EXTREMITY: Primary | ICD-10-CM

## 2023-05-23 PROCEDURE — 99213 OFFICE O/P EST LOW 20 MIN: CPT | Performed by: FAMILY MEDICINE

## 2023-05-23 RX ORDER — DOXYCYCLINE HYCLATE 100 MG/1
100 CAPSULE ORAL 2 TIMES DAILY
Qty: 20 CAPSULE | Refills: 0 | Status: SHIPPED | OUTPATIENT
Start: 2023-05-23

## 2023-05-23 RX ORDER — DOXYCYCLINE HYCLATE 100 MG/1
100 CAPSULE ORAL 2 TIMES DAILY
Qty: 20 CAPSULE | Refills: 0 | Status: SHIPPED | OUTPATIENT
Start: 2023-05-23 | End: 2023-05-23

## 2023-06-19 ENCOUNTER — OFFICE VISIT (OUTPATIENT)
Dept: FAMILY MEDICINE CLINIC | Facility: CLINIC | Age: 43
End: 2023-06-19
Payer: COMMERCIAL

## 2023-06-19 VITALS
TEMPERATURE: 98.7 F | WEIGHT: 315 LBS | HEART RATE: 74 BPM | BODY MASS INDEX: 36.45 KG/M2 | OXYGEN SATURATION: 96 % | HEIGHT: 78 IN | DIASTOLIC BLOOD PRESSURE: 68 MMHG | SYSTOLIC BLOOD PRESSURE: 118 MMHG

## 2023-06-19 DIAGNOSIS — E78.2 MIXED HYPERLIPIDEMIA: ICD-10-CM

## 2023-06-19 DIAGNOSIS — F32.A ANXIETY AND DEPRESSION: ICD-10-CM

## 2023-06-19 DIAGNOSIS — E11.9 TYPE 2 DIABETES MELLITUS WITHOUT COMPLICATION, WITHOUT LONG-TERM CURRENT USE OF INSULIN: Primary | ICD-10-CM

## 2023-06-19 DIAGNOSIS — F41.9 ANXIETY AND DEPRESSION: ICD-10-CM

## 2023-06-19 DIAGNOSIS — I10 PRIMARY HYPERTENSION: ICD-10-CM

## 2023-06-19 DIAGNOSIS — M79.3 PANNICULITIS: ICD-10-CM

## 2023-06-19 LAB
EXPIRATION DATE: NORMAL
HBA1C MFR BLD: 8.1 %
Lab: NORMAL

## 2023-06-19 RX ORDER — ESCITALOPRAM OXALATE 20 MG/1
20 TABLET ORAL DAILY
Qty: 90 TABLET | Refills: 3 | Status: SHIPPED | OUTPATIENT
Start: 2023-06-19

## 2023-06-19 RX ORDER — DULAGLUTIDE 1.5 MG/.5ML
1.5 INJECTION, SOLUTION SUBCUTANEOUS WEEKLY
Qty: 2 ML | Refills: 0 | Status: SHIPPED | OUTPATIENT
Start: 2023-06-19

## 2023-06-19 NOTE — PATIENT INSTRUCTIONS
Advance Care Planning and Advance Directives     You make decisions on a daily basis - decisions about where you want to live, your career, your home, your life. Perhaps one of the most important decisions you face is your choice for future medical care. Take time to talk with your family and your healthcare team and start planning today.  Advance Care Planning is a process that can help you:  Understand possible future healthcare decisions in light of your own experiences  Reflect on those decision in light of your goals and values  Discuss your decisions with those closest to you and the healthcare professionals that care for you  Make a plan by creating a document that reflects your wishes    Surrogate Decision Maker  In the event of a medical emergency, which has left you unable to communicate or to make your own decisions, you would need someone to make decisions for you.  It is important to discuss your preferences for medical treatment with this person while you are in good health.     Qualities of a surrogate decision maker:  Willing to take on this role and responsibility  Knows what you want for future medical care  Willing to follow your wishes even if they don't agree with them  Able to make difficult medical decisions under stressful circumstances    Advance Directives  These are legal documents you can create that will guide your healthcare team and decision maker(s) when needed. These documents can be stored in the electronic medical record.    Living Will - a legal document to guide your care if you have a terminal condition or a serious illness and are unable to communicate. States vary by statute in document names/types, but most forms may include one or more of the following:        -  Directions regarding life-prolonging treatments        -  Directions regarding artificially provided nutrition/hydration        -  Choosing a healthcare decision maker        -  Direction regarding organ/tissue  donation    Durable Power of  for Healthcare - this document names an -in-fact to make medical decisions for you, but it may also allow this person to make personal and financial decisions for you. Please seek the advice of an  if you need this type of document.    **Advance Directives are not required and no one may discriminate against you if you do not sign one.    Medical Orders  Many states allow specific forms/orders signed by your physician to record your wishes for medical treatment in your current state of health. This form, signed in personal communication with your physician, addresses resuscitation and other medical interventions that you may or may not want.      For more information or to schedule a time with a [facility] Advance Care Planning Facilitator contact: [email or web address] or call [phone] and someone will contact you directly.

## 2023-06-19 NOTE — PROGRESS NOTES
Follow Up Office Visit      Date: 2023   Patient Name: Dom Russell  : 1980   MRN: [id number]     Chief Complaint:    Chief Complaint   Patient presents with    Diabetes     Was given Trulcicty only took a month        History of Present Illness: Dom Russell is a 42 y.o. male who is here today for follow-up.  Patient is doing relatively well since last being seen with time problems noted.  He did take the Trulicity 0.75 mg weekly without any side effects.  He feels that it was not effective with suppressing his appetite.  Patient has not taken it since that time.  He has not been following his diet as well as he should.  His activity level is also diminished.  He does continue to have some problems with skin integrity where he has lost some of his weight and he is concerned that he may have problems with skin breakdown in the future.  Otherwise he has been doing relatively well.  He has continued to take his medication as prescribed.  He denies any side effects at present time.  He has not had any change in activity, appetite or sleep.  He denies any other cardiovascular, respiratory, gastrointestinal, urologic or neurologic complaints..    Subjective      Review of Systems:   Review of Systems   Constitutional:  Negative for activity change, appetite change and fatigue.   Respiratory:  Negative for cough and shortness of breath.    Cardiovascular:  Negative for chest pain, palpitations and leg swelling.   Gastrointestinal:  Negative for abdominal pain, constipation, diarrhea, nausea, vomiting, GERD and indigestion.   Genitourinary:  Negative for dysuria, flank pain, frequency and urgency.   Musculoskeletal:  Negative for arthralgias, gait problem and myalgias.   Neurological:  Negative for dizziness, weakness and memory problem.   Psychiatric/Behavioral:  Negative for sleep disturbance and depressed mood. The patient is not nervous/anxious.      I have reviewed the patients family history,  social history, past medical history, past surgical history and have updated it as appropriate.     Medications:     Current Outpatient Medications:     escitalopram (Lexapro) 20 MG tablet, Take 1 tablet by mouth Daily., Disp: 90 tablet, Rfl: 3    metFORMIN (GLUCOPHAGE) 1000 MG tablet, Take 1 tablet by mouth 2 (Two) Times a Day., Disp: 360 tablet, Rfl: 3    albuterol sulfate  (90 Base) MCG/ACT inhaler, Inhale 2 puffs Every 4 (Four) Hours As Needed for Wheezing (Chest tightness)., Disp: 6.7 g, Rfl: 0    amLODIPine (NORVASC) 10 MG tablet, Take 1 tablet by mouth Daily., Disp: , Rfl:     benazepril (LOTENSIN) 20 MG tablet, Take 1 tablet by mouth daily, Disp: 90 tablet, Rfl: 3    cetirizine (ZyrTEC) 10 MG chewable tablet, 1 tablet., Disp: , Rfl:     Dulaglutide (Trulicity) 1.5 MG/0.5ML solution pen-injector, Inject 1.5 mg under the skin into the appropriate area as directed 1 (One) Time Per Week., Disp: 2 mL, Rfl: 0    fenofibrate micronized (LOFIBRA) 134 MG capsule, 1 capsule., Disp: , Rfl:     metoprolol succinate XL (TOPROL-XL) 50 MG 24 hr tablet, Take 1 tablet by mouth Daily., Disp: , Rfl:     omeprazole (priLOSEC) 40 MG capsule, Take 1 capsule by mouth 2 (Two) Times a Day., Disp: 180 capsule, Rfl: 3    rosuvastatin (CRESTOR) 40 MG tablet, rosuvastatin 40 mg tablet  TAKE ONE TABLET BY MOUTH EVERY night, Disp: , Rfl:     Allergies:   Allergies   Allergen Reactions    Clarithromycin Hives and Anaphylaxis    Ceftazidime Hives    Cephalosporins Provider Review Needed    Codeine Hives    Penicillins Hives    Unasyn [Ampicillin-Sulbactam Sodium] Hives    Amoxicillin Unknown - Low Severity    Sulbactam Unknown - Low Severity       Immunizations:   Immunization History   Administered Date(s) Administered    Flu Vaccine Quad PF >36MO 10/18/2021, 10/03/2022    FluLaval/Fluzone >6mos 10/18/2021, 10/03/2022    Fluzone High Dose =>65 Years ([facility] ONLY) 10/30/2017    Fluzone Quad >6mos (Multi-dose) 11/30/2018, 10/01/2019  "   Hepatitis A 11/01/2018, 11/30/2018, 06/21/2019    Hepatitis B Adult/Adolescent IM 02/14/2006, 06/27/2006, 08/29/2006, 08/31/2020, 11/09/2020    Pneumococcal Conjugate 20-Valent (PCV20) 03/07/2023    Pneumococcal Polysaccharide (PPSV23) 12/20/2011, 06/06/2019    Td 08/14/1997    Tdap 12/07/2022        Objective     Physical Exam: Please see above  Vital Signs:   Vitals:    06/19/23 0740   BP: 118/68   BP Location: Left arm   Patient Position: Sitting   Cuff Size: Adult   Pulse: 74   Temp: 98.7 °F (37.1 °C)   TempSrc: Temporal   SpO2: 96%   Weight: (!) 159 kg (350 lb)   Height: 198.1 cm (78\")     Body mass index is 40.45 kg/m².          Physical Exam  Vitals and nursing note reviewed.   Constitutional:       Appearance: Normal appearance.   HENT:      Head: Normocephalic and atraumatic.      Nose: Nose normal.      Mouth/Throat:      Pharynx: Oropharynx is clear.   Eyes:      Extraocular Movements: Extraocular movements intact.      Pupils: Pupils are equal, round, and reactive to light.   Neck:      Thyroid: No thyroid mass or thyromegaly.      Trachea: Trachea normal.   Cardiovascular:      Rate and Rhythm: Normal rate and regular rhythm.      Pulses: Normal pulses. No decreased pulses.      Heart sounds: Normal heart sounds.   Pulmonary:      Effort: Pulmonary effort is normal.      Breath sounds: Normal breath sounds.   Abdominal:      General: Abdomen is flat. Bowel sounds are normal.      Palpations: Abdomen is soft.      Tenderness: There is no abdominal tenderness.   Musculoskeletal:      Cervical back: Neck supple.      Right lower leg: No edema.      Left lower leg: No edema.   Lymphadenopathy:      Cervical: No cervical adenopathy.   Skin:     General: Skin is warm and dry.   Neurological:      General: No focal deficit present.      Mental Status: He is alert and oriented to person, place, and time.      Sensory: Sensation is intact.      Motor: Motor function is intact.      Coordination: Coordination " is intact.   Psychiatric:         Attention and Perception: Attention normal.         Mood and Affect: Mood normal.         Speech: Speech normal.         Behavior: Behavior normal.       Procedures    Results:   Labs:   Hemoglobin A1C   Date Value Ref Range Status   06/19/2023 8.1 % Final   03/07/2023 7.00 (H) 4.80 - 5.60 % Final     TSH   Date Value Ref Range Status   12/07/2022 3.100 0.270 - 4.200 uIU/mL Final        POCT Results (if applicable):   Results for orders placed or performed in visit on 06/19/23   POC Glycosylated Hemoglobin (Hb A1C)    Specimen: Blood   Result Value Ref Range    Hemoglobin A1C 8.1 %    Lot Number [id number]     Expiration Date 01/31/2025        Imaging:   No valid procedures specified.     Measures:   Advanced Care Planning:   Patient does not have an advance directive, information provided.    Smoking Cessation:   Non-smoker.    Assessment / Plan      Assessment/Plan:   Diagnoses and all orders for this visit:    1. Type 2 diabetes mellitus without complication, without long-term current use of insulin (Primary)  Patient's hemoglobin A1c was noted to be 8.1% here in the office.  He has not been doing well with respect to his diet.  We have discussed options and will restart Trulicity but had a higher dose has he had no side effects with the lower dose.  We will also restart the metformi since his hemoglobin A1c has greatly worsened.  We have discussed the importance of his diet as well as his exercise program.  We will continue to monitor his symptoms and will reassess in 3 months time.  He will contact us during the third week of the Trulicity to see if we need to increase it to the 3 mg weekly dose.  -     POC Glycosylated Hemoglobin (Hb A1C)  -     Dulaglutide (Trulicity) 1.5 MG/0.5ML solution pen-injector; Inject 1.5 mg under the skin into the appropriate area as directed 1 (One) Time Per Week.  Dispense: 2 mL; Refill: 0  -     metFORMIN (GLUCOPHAGE) 1000 MG tablet; Take 1  tablet by mouth 2 (Two) Times a Day.  Dispense: 360 tablet; Refill: 3    2. Mixed hyperlipidemia   Patient's previous hemoglobin A1c was acceptable with respect to his LDL.  Triglycerides are a reflection of his poor diet.  We will continue with current regiment and will plan on continued laboratory data when he returns in 3 months time.  We will continue to adjust to keep his LDL less than 70.    3. Primary hypertension   Blood pressure is doing relatively well at present time.  He has tolerated his medications without side effects.  We will not make any changes at present time.    4. Anxiety and depression  Patient anxiety and depression has not been doing as well as we had hoped.  We will increase his Lexapro to 20 mg a day and will monitor his symptomatology.  -     escitalopram (Lexapro) 20 MG tablet; Take 1 tablet by mouth Daily.  Dispense: 90 tablet; Refill: 3    5. Panniculitis   Patient is having some skin integrity problems.  We will continue to monitor his weight and if he does continue to have some issues with problems with skin integrity referral to plastic surgery may be necessary.        Follow Up:   Return in about 3 months (around 9/19/2023) for Recheck.      At [facility], we believe that sharing information builds trust and better relationships. You are receiving this note because you recently visited [facility]. It is possible you will see health information before a provider has talked with you about it. This kind of information can be easy to misunderstand. To help you fully understand what it means for your health, we urge you to discuss this note with your provider.    Rigoberto Brand MD  [facility] [place] [facility]

## 2023-08-30 ENCOUNTER — PATIENT MESSAGE (OUTPATIENT)
Dept: FAMILY MEDICINE CLINIC | Facility: CLINIC | Age: 43
End: 2023-08-30
Payer: COMMERCIAL

## 2023-08-30 DIAGNOSIS — E11.9 TYPE 2 DIABETES MELLITUS WITHOUT COMPLICATION, WITHOUT LONG-TERM CURRENT USE OF INSULIN: Primary | ICD-10-CM

## 2023-09-05 RX ORDER — DULAGLUTIDE 4.5 MG/.5ML
4.5 INJECTION, SOLUTION SUBCUTANEOUS WEEKLY
Qty: 2 ML | Refills: 11 | Status: SHIPPED | OUTPATIENT
Start: 2023-09-05

## 2023-09-05 NOTE — TELEPHONE ENCOUNTER
Serjio, Generic 9/1/2023 2:54 PM EDT    3 mg, I’m handling everything good, probably could just max it out.

## 2023-10-05 ENCOUNTER — OFFICE VISIT (OUTPATIENT)
Dept: FAMILY MEDICINE CLINIC | Facility: CLINIC | Age: 43
End: 2023-10-05
Payer: COMMERCIAL

## 2023-10-05 VITALS
RESPIRATION RATE: 18 BRPM | DIASTOLIC BLOOD PRESSURE: 80 MMHG | OXYGEN SATURATION: 98 % | TEMPERATURE: 98 F | SYSTOLIC BLOOD PRESSURE: 120 MMHG | BODY MASS INDEX: 36.45 KG/M2 | WEIGHT: 315 LBS | HEIGHT: 78 IN | HEART RATE: 65 BPM

## 2023-10-05 DIAGNOSIS — E11.9 TYPE 2 DIABETES MELLITUS WITHOUT COMPLICATION, WITHOUT LONG-TERM CURRENT USE OF INSULIN: ICD-10-CM

## 2023-10-05 DIAGNOSIS — E78.2 MIXED HYPERLIPIDEMIA: ICD-10-CM

## 2023-10-05 DIAGNOSIS — I10 PRIMARY HYPERTENSION: ICD-10-CM

## 2023-10-05 DIAGNOSIS — E66.01 MORBID (SEVERE) OBESITY DUE TO EXCESS CALORIES: ICD-10-CM

## 2023-10-05 DIAGNOSIS — M53.3 SACRAL BACK PAIN: Primary | ICD-10-CM

## 2023-10-05 LAB
EXPIRATION DATE: NORMAL
HBA1C MFR BLD: 6.7 %
Lab: NORMAL

## 2023-10-05 RX ORDER — PREDNISONE 10 MG/1
TABLET ORAL
Qty: 36 TABLET | Refills: 0 | Status: SHIPPED | OUTPATIENT
Start: 2023-10-05 | End: 2023-10-12

## 2023-10-05 NOTE — PROGRESS NOTES
"    Follow Up Office Visit      Date: 10/05/2023   Patient Name: Dom Russell  : 1980   MRN: [id number]     Chief Complaint:    Chief Complaint   Patient presents with    Tailbone Pain     Pain x 2 yrs.    Difficulty Urinating       History of Present Illness: Dom Russell is a 43 y.o. male who is here today for follow-up.  Patient has been doing relatively well with respect to his diabetes at present time.  Does continue to take his medication and has been doing well with respect to tolerance of medications.  Patient however is not losing any weight at present time and has recently increased the Trulicity to 4.5 mg weekly.  Patient has been having some problems with sacral pain.  He injured his \"tailbone\" after he fell out of a fire truck approximately 2 years ago.  He had evaluation at that time that did not reveal any abnormality and he had been doing well until recently.  He has developed some pain as well as intense soreness in his mid sacral region.  He denies any incontinence at present time nor radiculopathy.  He feels that is tender to the touch and is causing some problems when he is sitting and doing other modalities.  Patient was seen at the urgent treatment center with x-rays that did not reveal any abnormality.  He was advised that he may need an MRI obtained for further evaluation.  Prior to this patient has continue with his usual activity, appetite and sleep.  Patient has not had any other cardiovascular, respiratory, gastrointestinal, a urologic or neurologic complaints.  Patient states he does have occasional difficulty urinating but this is intermittent.    Subjective      Review of Systems:   Review of Systems   Constitutional:  Negative for activity change, appetite change and fatigue.   Respiratory:  Negative for cough and shortness of breath.    Cardiovascular:  Negative for chest pain, palpitations and leg swelling.   Gastrointestinal:  Negative for abdominal pain, blood in stool, " constipation, diarrhea, nausea, vomiting, GERD and indigestion.   Genitourinary:  Positive for difficulty urinating. Negative for dysuria, flank pain, frequency, nocturia and urgency.   Musculoskeletal:  Positive for arthralgias, back pain and myalgias.   Neurological:  Negative for dizziness, weakness and memory problem.   Psychiatric/Behavioral:  Negative for sleep disturbance and depressed mood. The patient is not nervous/anxious.      I have reviewed the patients family history, social history, past medical history, past surgical history and have updated it as appropriate.     Medications:     Current Outpatient Medications:     albuterol sulfate  (90 Base) MCG/ACT inhaler, Inhale 2 puffs Every 4 (Four) Hours As Needed for Wheezing (Chest tightness)., Disp: 6.7 g, Rfl: 0    benazepril (LOTENSIN) 20 MG tablet, Take 1 tablet by mouth daily, Disp: 90 tablet, Rfl: 3    cetirizine (ZyrTEC) 10 MG chewable tablet, 1 tablet., Disp: , Rfl:     Dulaglutide (Trulicity) 4.5 MG/0.5ML solution pen-injector, Inject 0.5 mL under the skin into the appropriate area as directed 1 (One) Time Per Week., Disp: 2 mL, Rfl: 11    escitalopram (Lexapro) 20 MG tablet, Take 1 tablet by mouth Daily., Disp: 90 tablet, Rfl: 3    fenofibrate micronized (LOFIBRA) 134 MG capsule, 1 capsule., Disp: , Rfl:     metFORMIN (GLUCOPHAGE) 1000 MG tablet, Take 1 tablet by mouth 2 (Two) Times a Day., Disp: 360 tablet, Rfl: 3    metoprolol succinate XL (TOPROL-XL) 50 MG 24 hr tablet, TAKE ONE TABLET BY MOUTH EVERY DAY, Disp: 90 tablet, Rfl: 1    omeprazole (priLOSEC) 40 MG capsule, Take 1 capsule by mouth 2 (Two) Times a Day., Disp: 180 capsule, Rfl: 3    rosuvastatin (CRESTOR) 20 MG tablet, Take 1 tablet by mouth daily, Disp: 90 tablet, Rfl: 1    Allergies:   Allergies   Allergen Reactions    Clarithromycin Hives and Anaphylaxis    Ceftazidime Hives    Cephalosporins Provider Review Needed    Codeine Hives    Penicillins Hives    Unasyn  "[Ampicillin-Sulbactam Sodium] Hives    Amoxicillin Unknown - Low Severity    Sulbactam Unknown - Low Severity       Immunizations:   Immunization History   Administered Date(s) Administered    Flu Vaccine Quad PF >36MO 10/18/2021, 10/03/2022    Fluzone (or Fluarix & Flulaval for VFC) >6mos 10/18/2021, 10/03/2022    Fluzone High Dose =>65 Years ([facility]axcare ONLY) 10/30/2017    Fluzone Quad >6mos (Multi-dose) 11/30/2018, 10/01/2019    Hepatitis A 11/01/2018, 11/30/2018, 06/21/2019    Hepatitis B Adult/Adolescent IM 02/14/2006, 06/27/2006, 08/29/2006, 08/31/2020, 11/09/2020    Pneumococcal Conjugate 20-Valent (PCV20) 03/07/2023    Pneumococcal Polysaccharide (PPSV23) 12/20/2011, 06/06/2019    Td (TDVAX) 08/14/1997    Tdap 12/07/2022        Objective     Physical Exam: Please see above  Vital Signs:   Vitals:    10/05/23 1537   BP: 120/80   BP Location: Left arm   Patient Position: Sitting   Cuff Size: Large Adult   Pulse: 65   Resp: 18   Temp: 98 °F (36.7 °C)   TempSrc: Temporal   SpO2: 98%   Weight: (!) 161 kg (356 lb)   Height: 198.1 cm (78\")     Body mass index is 41.14 kg/m².          Physical Exam  Vitals and nursing note reviewed.   Constitutional:       Appearance: Normal appearance.   HENT:      Head: Normocephalic and atraumatic.      Nose: Nose normal.      Mouth/Throat:      Pharynx: Oropharynx is clear.   Eyes:      Extraocular Movements: Extraocular movements intact.      Pupils: Pupils are equal, round, and reactive to light.   Neck:      Thyroid: No thyroid mass or thyromegaly.      Trachea: Trachea normal.   Cardiovascular:      Rate and Rhythm: Normal rate and regular rhythm.      Pulses: Normal pulses. No decreased pulses.      Heart sounds: Normal heart sounds.   Pulmonary:      Effort: Pulmonary effort is normal.      Breath sounds: Normal breath sounds.   Abdominal:      General: Abdomen is flat. Bowel sounds are normal.      Palpations: Abdomen is soft.      Tenderness: There is no abdominal " "tenderness.   Musculoskeletal:      Cervical back: Neck supple.      Right lower leg: No edema.      Left lower leg: No edema.   Lymphadenopathy:      Cervical: No cervical adenopathy.   Skin:     General: Skin is warm and dry.   Neurological:      General: No focal deficit present.      Mental Status: He is alert and oriented to person, place, and time.      Sensory: Sensation is intact.      Motor: Motor function is intact.      Coordination: Coordination is intact.   Psychiatric:         Attention and Perception: Attention normal.         Mood and Affect: Mood normal.         Speech: Speech normal.         Behavior: Behavior normal.       Procedures    Results:   Labs:   Hemoglobin A1C   Date Value Ref Range Status   10/05/2023 6.7 %    03/07/2023 7.00 (H) 4.80 - 5.60 % Final     TSH   Date Value Ref Range Status   12/07/2022 3.100 0.270 - 4.200 uIU/mL Final        POCT Results (if applicable):   Results for orders placed or performed in visit on 10/05/23   POC Glycosylated Hemoglobin (Hb A1C)    Specimen: Blood   Result Value Ref Range    Hemoglobin A1C 6.7 %    Lot Number [id number]     Expiration Date 05/07/2025        Imaging:   No valid procedures specified.     Measures:   Advanced Care Planning:   Patient does not have an advance directive, information provided.    Smoking Cessation:   Non-smoker.    Assessment / Plan      Assessment/Plan:   Diagnoses and all orders for this visit:    1. Sacral back pain (Primary)  Patient is having some problems with sacral pain that does not appear to be involving his lumbar spine.  I am uncertain if there is any modality that we can do to help improve his symptoms without possible injections etc.  Anti-inflammatories have not been beneficial.  We will make arrangements for MRI to see if there is underlying pathology since his x-ray was reported as \"normal\".  We will also make referral as I do believe that he is a possible candidate for injections.  Since his hemoglobin " A1c was acceptable we will try a short course of prednisone.  -     MRI Lumbar Spine Without Contrast; Future  -     Ambulatory Referral to Pain Management    2. Type 2 diabetes mellitus without complication, without long-term current use of insulin  Patient has gained some weight and his last hemoglobin A1c was noted to be 8.1%.  Repeat today was noted be 6.7%.  He will continue with his current regimen and monitor.  If he does continue to gain weight we will switch her from the Trulicity over to the Mounjaro or Ozempic.  We will pursue and treat accordingly.  He will try to increase his activity level if possible.  -     POC Glycosylated Hemoglobin (Hb A1C)    3. Mixed hyperlipidemia   Patient has done well with respect to tolerance of his cholesterol medications.  He is due for a lipid profile in the near future.  We will continue to adjust medications to keep his LDL less than 70.    4. Primary hypertension   Patient's blood pressures been doing well since he has lost some weight.  We will discontinue the amlodipine and will monitor closely.  He appears that he has been doing well off of the amlodipine.    5. Morbid (severe) obesity due to excess calories   Patient's BMI remains elevated.  He has gained weight recently.  We will pursue with increasing his Trulicity and will monitor.  If he does not show benefit, we will probably switch him over to Mounjaro or Ozempic.      Follow Up:   No follow-ups on file.      At [facility], we believe that sharing information builds trust and better relationships. You are receiving this note because you recently visited [facility]. It is possible you will see health information before a provider has talked with you about it. This kind of information can be easy to misunderstand. To help you fully understand what it means for your health, we urge you to discuss this note with your provider.    Rigoberto Brand MD  [facility]

## 2023-10-10 ENCOUNTER — TELEPHONE (OUTPATIENT)
Dept: FAMILY MEDICINE CLINIC | Facility: CLINIC | Age: 43
End: 2023-10-10
Payer: COMMERCIAL

## 2023-10-10 DIAGNOSIS — M53.3 SACRAL BACK PAIN: Primary | ICD-10-CM

## 2023-10-10 NOTE — TELEPHONE ENCOUNTER
SPOKE WITH MELANIE , MRI DID NOT MEET CRITERIA DUE TO CONSERVATIVE TREATMENTS NOT ATTEMPTED. ADVISED PATIENT THE MRI HAS BEEN DENIED, ALSO ADVISED PAIN MGMT WANTS MRI PRIOR TO SCHEDULING, PATIENS WANTED TO ASK ABOUT AN ORTHOPEDIC REFERRAL FOR HIS SACRAL PAIN??

## 2023-10-10 NOTE — TELEPHONE ENCOUNTER
We could possibly refer her to orthopedics but they may also want physical therapy assessment.  The start with physical therapy assessment.

## 2023-10-27 ENCOUNTER — TELEPHONE (OUTPATIENT)
Dept: FAMILY MEDICINE CLINIC | Facility: CLINIC | Age: 43
End: 2023-10-27
Payer: COMMERCIAL

## 2023-10-27 NOTE — TELEPHONE ENCOUNTER
JOVANNA BECK PT FROM [facility] PHYSICAL THERAPY CALLED, HE BELIEVES HE CAN HELP WITH PATIENTS LOW BACK AND HIP PAIN, BUT WILL BE UNABLE TO HELP COCCYX PAIN  [phone]

## 2023-11-21 ENCOUNTER — TELEPHONE (OUTPATIENT)
Dept: FAMILY MEDICINE CLINIC | Facility: CLINIC | Age: 43
End: 2023-11-21

## 2023-11-21 NOTE — TELEPHONE ENCOUNTER
Caller: Dom Russell    Relationship to patient: Self    Best call back number: [phone]     Patient is needing: PATIENT IS CALLING IN REGARDS TO HIS MRI. HE HAS IT SCHEDULED FOR 12.1.23 AS HE IS WANTING TO KNOW IF HE CAN GET IN SOONER.     PATIENT STATES HIS PHYSICAL THERAPIST IS UNABLE TO HELP HIM UNLESS THE MRI IS COMPLETED.     PLEASE CALL PATIENT BACK, IF NO ANSWER LEAVE A DETAILED MESSAGE.

## 2024-01-07 ENCOUNTER — HOSPITAL ENCOUNTER (OUTPATIENT)
Dept: MRI IMAGING | Facility: HOSPITAL | Age: 44
Discharge: HOME | End: 2024-01-07
Admitting: FAMILY MEDICINE
Payer: COMMERCIAL

## 2024-01-07 DIAGNOSIS — M53.3 SACRAL BACK PAIN: ICD-10-CM

## 2024-01-07 PROCEDURE — 72148 MRI LUMBAR SPINE W/O DYE: CPT

## 2024-01-08 ENCOUNTER — TELEPHONE (OUTPATIENT)
Dept: FAMILY MEDICINE CLINIC | Facility: CLINIC | Age: 44
End: 2024-01-08
Payer: COMMERCIAL

## 2024-01-08 DIAGNOSIS — M54.16 LUMBAR BACK PAIN WITH RADICULOPATHY AFFECTING LEFT LOWER EXTREMITY: Primary | ICD-10-CM

## 2024-01-08 NOTE — TELEPHONE ENCOUNTER
----- Message from Rigoberto Brand MD sent at 1/8/2024  4:25 PM EST -----  MRI looked pretty good until the L5/S1 level.  It does appear that he has a herniated disc off to the left that is showing severe narrowing where the left spinal nerve exits.  If he is having difficulty with pain down his left leg then he probably needs to be seen by neurosurgeon.

## 2024-01-08 NOTE — TELEPHONE ENCOUNTER
Patient is having pain down his left leg that is getting worse. He is interested in seeing an neurosurgeon. He is good with going wherever he can get in first.

## 2024-01-08 NOTE — TELEPHONE ENCOUNTER
SPOKE WITH THE PATIENT , WANTS TO DISCUSS MRI RESULTS, PRIOR TO MOVING FORWARD WITH REFERRAL TO PAIN MANAGEMENT, PATIENT IS SCHEDULED 01/09/2024 @11:15 AM.

## 2024-01-09 ENCOUNTER — TELEPHONE (OUTPATIENT)
Dept: FAMILY MEDICINE CLINIC | Facility: CLINIC | Age: 44
End: 2024-01-09

## 2024-01-09 ENCOUNTER — OFFICE VISIT (OUTPATIENT)
Dept: FAMILY MEDICINE CLINIC | Facility: CLINIC | Age: 44
End: 2024-01-09
Payer: COMMERCIAL

## 2024-01-09 VITALS
HEIGHT: 78 IN | SYSTOLIC BLOOD PRESSURE: 128 MMHG | DIASTOLIC BLOOD PRESSURE: 84 MMHG | HEART RATE: 76 BPM | TEMPERATURE: 98 F | RESPIRATION RATE: 18 BRPM | WEIGHT: 315 LBS | OXYGEN SATURATION: 96 % | BODY MASS INDEX: 36.45 KG/M2

## 2024-01-09 DIAGNOSIS — F32.A ANXIETY AND DEPRESSION: ICD-10-CM

## 2024-01-09 DIAGNOSIS — E66.01 MORBID (SEVERE) OBESITY DUE TO EXCESS CALORIES: ICD-10-CM

## 2024-01-09 DIAGNOSIS — E11.9 TYPE 2 DIABETES MELLITUS WITHOUT COMPLICATION, WITHOUT LONG-TERM CURRENT USE OF INSULIN: ICD-10-CM

## 2024-01-09 DIAGNOSIS — E78.2 MIXED HYPERLIPIDEMIA: ICD-10-CM

## 2024-01-09 DIAGNOSIS — M54.16 LUMBAR BACK PAIN WITH RADICULOPATHY AFFECTING LEFT LOWER EXTREMITY: Primary | ICD-10-CM

## 2024-01-09 DIAGNOSIS — I10 PRIMARY HYPERTENSION: ICD-10-CM

## 2024-01-09 DIAGNOSIS — Z23 NEED FOR VACCINATION AGAINST STREPTOCOCCUS PNEUMONIAE: ICD-10-CM

## 2024-01-09 DIAGNOSIS — F41.9 ANXIETY AND DEPRESSION: ICD-10-CM

## 2024-01-09 PROBLEM — K80.20 CHOLELITHIASIS WITHOUT OBSTRUCTION: Status: ACTIVE | Noted: 2022-02-11

## 2024-01-09 PROBLEM — Z90.3 HISTORY OF SLEEVE GASTRECTOMY: Status: ACTIVE | Noted: 2021-12-14

## 2024-01-09 LAB
EXPIRATION DATE: ABNORMAL
EXPIRATION DATE: NORMAL
HBA1C MFR BLD: 6.3 % (ref 4.5–5.7)
Lab: ABNORMAL
Lab: NORMAL
POC CREATININE URINE: NORMAL
POC MICROALBUMIN URINE: NORMAL

## 2024-01-09 RX ORDER — ESCITALOPRAM OXALATE 20 MG/1
40 TABLET ORAL DAILY
Qty: 180 TABLET | Refills: 3 | Status: SHIPPED | OUTPATIENT
Start: 2024-01-09

## 2024-01-09 NOTE — PROGRESS NOTES
Follow Up Office Visit      Date: 2024   Patient Name: Dom Russell  : 1980   MRN: [id number]     Chief Complaint:    Chief Complaint   Patient presents with    Follow-up    Diabetes    Back Pain       History of Present Illness: Dom Russell is a 43 y.o. male who is here today for evaluation of recent MRI of the abdomen was noted to have a left-sided herniated disc of L5/S1 with possible nerve root compression.  Patient states he does continue to have abnormality with respect to his sacral tenderness.  He does have radiculopathy but the majority of his symptoms is when he presses on his sacrum.  There has not been any abnormality noted by the radiologist with respect to previous CTs scans or MRIs.  He denies any bowel or bladder incontinence.  Patient has also been doing relatively well with respect to his medications.  He does continue with his current regimen and does believe that his diabetes is improving.  He does continue to lose weight with 9 pounds over the previous 3 months.  He denies any changes in his activity, appetite or sleep except what is induced by the GLP-1 agonist.  He has not had any other cardiovascular, respiratory, gastrointestinal, urologic or neurologic complaints.    Subjective      Review of Systems:   Review of Systems   Constitutional:  Negative for activity change, appetite change and fatigue.   Respiratory:  Negative for cough, shortness of breath and wheezing.    Cardiovascular:  Negative for chest pain, palpitations and leg swelling.   Gastrointestinal:  Negative for abdominal distention, abdominal pain, constipation, diarrhea, nausea, vomiting, GERD and indigestion.   Genitourinary:  Negative for dysuria, flank pain, frequency and urgency.   Musculoskeletal:  Positive for back pain. Negative for arthralgias, gait problem, joint swelling and myalgias.   Neurological:  Negative for dizziness, weakness and memory problem.   Psychiatric/Behavioral:   Negative for sleep disturbance and depressed mood. The patient is not nervous/anxious.        I have reviewed the patients family history, social history, past medical history, past surgical history and have updated it as appropriate.     Medications:     Current Outpatient Medications:     albuterol sulfate  (90 Base) MCG/ACT inhaler, Inhale 2 puffs Every 4 (Four) Hours As Needed for Wheezing (Chest tightness)., Disp: 6.7 g, Rfl: 0    benazepril (LOTENSIN) 20 MG tablet, Take 1 tablet by mouth daily, Disp: 90 tablet, Rfl: 3    cetirizine (ZyrTEC) 10 MG chewable tablet, 1 tablet., Disp: , Rfl:     Dulaglutide (Trulicity) 4.5 MG/0.5ML solution pen-injector, Inject 0.5 mL under the skin into the appropriate area as directed 1 (One) Time Per Week., Disp: 2 mL, Rfl: 11    escitalopram (Lexapro) 20 MG tablet, Take 2 tablets by mouth Daily., Disp: 180 tablet, Rfl: 3    fenofibrate micronized (LOFIBRA) 134 MG capsule, 1 capsule., Disp: , Rfl:     metFORMIN (GLUCOPHAGE) 1000 MG tablet, Take 1 tablet by mouth 2 (Two) Times a Day., Disp: 360 tablet, Rfl: 3    metoprolol succinate XL (TOPROL-XL) 50 MG 24 hr tablet, TAKE ONE TABLET BY MOUTH EVERY DAY, Disp: 90 tablet, Rfl: 1    omeprazole (priLOSEC) 40 MG capsule, Take 1 capsule by mouth 2 (Two) Times a Day., Disp: 180 capsule, Rfl: 3    rosuvastatin (CRESTOR) 20 MG tablet, Take 1 tablet by mouth daily, Disp: 90 tablet, Rfl: 1    Allergies:   Allergies   Allergen Reactions    Clarithromycin Hives and Anaphylaxis    Ceftazidime Hives    Cephalosporins Provider Review Needed    Codeine Hives    Penicillins Hives    Unasyn [Ampicillin-Sulbactam Sodium] Hives    Amoxicillin Unknown - Low Severity    Sulbactam Unknown - Low Severity       Immunizations:   Immunization History   Administered Date(s) Administered    Flu Vaccine Quad PF >36MO 10/18/2021, 10/03/2022    Fluzone (or Fluarix & Flulaval for VFC) >6mos 10/18/2021, 10/03/2022    Fluzone High Dose =>65 Years ([employer]  "ONLY) 10/30/2017    Fluzone Quad >6mos (Multi-dose) 11/30/2018, 10/01/2019    Hepatitis A 11/01/2018, 11/30/2018, 06/21/2019    Hepatitis B Adult/Adolescent IM 02/14/2006, 06/27/2006, 08/29/2006, 08/31/2020, 11/09/2020    Pneumococcal Conjugate 20-Valent (PCV20) 03/07/2023    Pneumococcal Polysaccharide (PPSV23) 12/20/2011, 06/06/2019    Td (TDVAX) 08/14/1997    Tdap 12/07/2022        Objective     Physical Exam: Please see above  Vital Signs:   Vitals:    01/09/24 1119 01/09/24 1210   BP: 150/98 128/84   BP Location: Left arm    Patient Position: Sitting    Cuff Size: Adult    Pulse: 76    Resp: 18    Temp: 98 °F (36.7 °C)    TempSrc: Temporal    SpO2: 96%    Weight: (!) 157 kg (347 lb)    Height: 198.1 cm (77.99\")      Body mass index is 40.11 kg/m².          Physical Exam  Vitals and nursing note reviewed.   Constitutional:       Appearance: Normal appearance.   HENT:      Head: Normocephalic and atraumatic.      Nose: Nose normal.      Mouth/Throat:      Pharynx: Oropharynx is clear.   Eyes:      Extraocular Movements: Extraocular movements intact.      Pupils: Pupils are equal, round, and reactive to light.   Neck:      Thyroid: No thyroid mass or thyromegaly.      Trachea: Trachea normal.   Cardiovascular:      Rate and Rhythm: Normal rate and regular rhythm.      Pulses: Normal pulses. No decreased pulses.      Heart sounds: Normal heart sounds.   Pulmonary:      Effort: Pulmonary effort is normal.      Breath sounds: Normal breath sounds.   Abdominal:      General: Abdomen is flat. Bowel sounds are normal.      Palpations: Abdomen is soft.      Tenderness: There is no abdominal tenderness.   Musculoskeletal:      Cervical back: Neck supple.      Right lower leg: No edema.      Left lower leg: No edema.   Lymphadenopathy:      Cervical: No cervical adenopathy.   Skin:     General: Skin is warm and dry.   Neurological:      General: No focal deficit present.      Mental Status: He is alert and oriented to " person, place, and time.      Sensory: Sensation is intact.      Motor: Motor function is intact.      Coordination: Coordination is intact.   Psychiatric:         Attention and Perception: Attention normal.         Mood and Affect: Mood normal.         Speech: Speech normal.         Behavior: Behavior normal.         Procedures    Results:   Labs:   Hemoglobin A1C   Date Value Ref Range Status   01/09/2024 6.3 (A) 4.5 - 5.7 % Final   03/07/2023 7.00 (H) 4.80 - 5.60 % Final     TSH   Date Value Ref Range Status   12/07/2022 3.100 0.270 - 4.200 uIU/mL Final        POCT Results (if applicable):   Results for orders placed or performed in visit on 01/09/24   POC Microalbumin    Specimen: Urine   Result Value Ref Range    Microalbumin, Urine 30mg/L     Creatinine, Urine 300mg/dl     Lot Number [id number]     Expiration Date 10/17/2024    POC Glycosylated Hemoglobin (Hb A1C)    Specimen: Blood   Result Value Ref Range    Hemoglobin A1C 6.3 (A) 4.5 - 5.7 %    Lot Number [id number]     Expiration Date 10/22/2025        Imaging:   No valid procedures specified.     Measures:   Advanced Care Planning:   Patient does not have an advance directive, information provided.    Smoking Cessation:   Non-smoker.    Assessment / Plan      Assessment/Plan:   Diagnoses and all orders for this visit:    1. Lumbar back pain with radiculopathy affecting left lower extremity (Primary)   Patient does have some low back pain with radiculopathy down his left leg that we will be consistent with the MRI.  He is also having some sacral tenderness which could be musculoskeletal or possibly even due to some nerve involvement.  We have discussed the options of trying gabapentin but I will defer until he sees a neurosurgeon to see if there is any other possible intervention.  Patient may be even a candidate for injections at the site of his point tenderness.  We will make the referral to a neurosurgeon and will pursue.  Patient does not wish to  try prednisone at present time as it has not been beneficial in the past.    2. Type 2 diabetes mellitus without complication, without long-term current use of insulin  Patient is tolerating his diabetic medications without complaints.  He does continue to lose weight.  He is hemoglobin A1c is improved compared to previous and his urine microalbumin did not reveal any abnormality.  We will continue to monitor with reassessment in 3 months time.  If he has any issues the meantime he will contact us.  -     POC Microalbumin  -     POC Glycosylated Hemoglobin (Hb A1C)    3. Primary hypertension   Initial blood pressure was noted to be elevated.  Repeat blood pressure is acceptable.  We have encouraged him to keep a close eye on his blood pressure and will make modifications as necessary based on these levels.    4. Mixed hyperlipidemia   We will defer obtaining a lipid profile at present time.  He is continue to take his lipid-lowering agents currently.  He has tolerated these well and we will recheck his lipid profile when he returns to clinic and just to keep his LDL less than 70.    5. Morbid (severe) obesity due to excess calories   Patient is doing better with respect to his weight using the medications.  We will make no modifications at present time.    6. Need for influenza vaccine   Patient did receive this through work.  We will try obtain a copy of the date.    7. Anxiety and depression  Patient has had some problems with respect to depressions/agitation despite the use of Lexapro.  We have discussed options and will try to increase Lexapro initially to 30 mg with possibly 40 mg.  It may be necessary for us to consider other medications or possibly even pursuing that he may have a little bit of mood disorder.  We will make no adjustments at present time.-     escitalopram (Lexapro) 20 MG tablet; Take 2 tablets by mouth Daily.  Dispense: 180 tablet; Refill: 3        Follow Up:   Return in about 8 weeks (around  3/7/2024).      At [facility], we believe that sharing information builds trust and better relationships. You are receiving this note because you recently visited [facility]. It is possible you will see health information before a provider has talked with you about it. This kind of information can be easy to misunderstand. To help you fully understand what it means for your health, we urge you to discuss this note with your provider.    Rigoberto Brand MD  [facility]

## 2024-01-09 NOTE — TELEPHONE ENCOUNTER
Call to find out when he had the flu shot.  If he has not had the flu shot this season then we need to give him the flu shot.

## 2024-01-18 DIAGNOSIS — K21.9 CHRONIC GERD: ICD-10-CM

## 2024-01-18 RX ORDER — OMEPRAZOLE 40 MG/1
40 CAPSULE, DELAYED RELEASE ORAL DAILY
Qty: 90 CAPSULE | Refills: 0 | Status: SHIPPED | OUTPATIENT
Start: 2024-01-18

## 2024-01-18 RX ORDER — METOPROLOL SUCCINATE 50 MG/1
50 TABLET, EXTENDED RELEASE ORAL DAILY
Qty: 90 TABLET | Refills: 0 | Status: SHIPPED | OUTPATIENT
Start: 2024-01-18

## 2024-01-18 RX ORDER — ROSUVASTATIN CALCIUM 20 MG/1
20 TABLET, COATED ORAL DAILY
Qty: 90 TABLET | Refills: 0 | Status: SHIPPED | OUTPATIENT
Start: 2024-01-18

## 2024-01-19 ENCOUNTER — OFFICE VISIT (OUTPATIENT)
Dept: NEUROSURGERY | Facility: CLINIC | Age: 44
End: 2024-01-19
Payer: COMMERCIAL

## 2024-01-19 VITALS
TEMPERATURE: 96.6 F | BODY MASS INDEX: 36.45 KG/M2 | WEIGHT: 315 LBS | DIASTOLIC BLOOD PRESSURE: 120 MMHG | SYSTOLIC BLOOD PRESSURE: 180 MMHG | HEIGHT: 78 IN

## 2024-01-19 DIAGNOSIS — M54.16 LUMBAR RADICULOPATHY: ICD-10-CM

## 2024-01-19 DIAGNOSIS — M53.3 COCCYX PAIN: Primary | ICD-10-CM

## 2024-01-19 NOTE — PROGRESS NOTES
Patient: Dom Russell  : 1980    Primary Care Provider: Rigoberto Brand MD    Requesting Provider: As above      Chief Complaint: Back Pain, Hip Pain, and Leg Pain      History of Present Illness: This is a 43 y.o. male who presents for evaluation of 2 separate issues.  The first of which, and what bothers him the most is significant pain on the inferior aspect of his tailbone.  The patient states this began approximately 2 years ago where he fell at work and landed on his bottom.  After that time, he has had significant pain in the very low portion of his tailbone.  He states when he sits in certain positions it can be quite severe.  He does not seem to have pain in this area when he is standing.  He feels like it is progressively worsened over the past 2 years.  In addition to this, he is also dealing with a separate problem.  He has been dealing with slowly worsening back and left lower extremity pain over the past year.  He describes pain that starts in his back and radiates down the posterior aspect of his left leg.  He previously tried general physical therapy, but was not focused on his lower back and it did not help.  He denies any weakness in his legs.    PMHX  Allergies:  Allergies   Allergen Reactions    Clarithromycin Hives and Anaphylaxis    Ceftazidime Hives    Cephalosporins Provider Review Needed    Codeine Hives    Penicillins Hives    Unasyn [Ampicillin-Sulbactam Sodium] Hives    Amoxicillin Unknown - Low Severity    Sulbactam Unknown - Low Severity     Medications    Current Outpatient Medications:     albuterol sulfate  (90 Base) MCG/ACT inhaler, Inhale 2 puffs Every 4 (Four) Hours As Needed for Wheezing (Chest tightness)., Disp: 6.7 g, Rfl: 0    benazepril (LOTENSIN) 20 MG tablet, Take 1 tablet by mouth daily, Disp: 90 tablet, Rfl: 3    cetirizine (ZyrTEC) 10 MG chewable tablet, 1 tablet., Disp: , Rfl:     Dulaglutide (Trulicity) 4.5 MG/0.5ML solution  pen-injector, Inject 0.5 mL under the skin into the appropriate area as directed 1 (One) Time Per Week., Disp: 2 mL, Rfl: 11    escitalopram (Lexapro) 20 MG tablet, Take 2 tablets by mouth Daily., Disp: 180 tablet, Rfl: 3    fenofibrate micronized (LOFIBRA) 134 MG capsule, 1 capsule., Disp: , Rfl:     metFORMIN (GLUCOPHAGE) 1000 MG tablet, Take 1 tablet by mouth 2 (Two) Times a Day., Disp: 360 tablet, Rfl: 3    metoprolol succinate XL (TOPROL-XL) 50 MG 24 hr tablet, Take 1 tablet by mouth Daily. Patient will need appointment around March 7, 2024., Disp: 90 tablet, Rfl: 0    omeprazole (priLOSEC) 40 MG capsule, Take 1 capsule by mouth Daily. Patient will need appointment around March 7, 2024., Disp: 90 capsule, Rfl: 0    rosuvastatin (CRESTOR) 20 MG tablet, Take 1 tablet by mouth Daily. Patient will need appointment around March 7, 2024., Disp: 90 tablet, Rfl: 0  Past Medical History:  Past Medical History:   Diagnosis Date    Acute bronchitis     Adult-onset obesity     Agitated     Allergic rhinitis     Anxiety and depression     Arthritis     Asthma     Brain concussion 2003    Cellulitis     Chronic GERD     Diabetes mellitus     Diarrhea     Dyspnea     Exposure to influenza     GERD (gastroesophageal reflux disease)     Gonadal dysgenesis     Headache     Hemorrhoid     Hyperlipidemia     Hypersomnia with sleep apnea     Hypertension, benign     Kidney stone 2023    [facility]    Leg skin lesion, right     Low back pain 2015    Lumbosacral disc disease 2021    Mixed dyslipidemia     Morbid obesity     MRSA (methicillin resistant Staphylococcus aureus) 2021    Pain in joint involving shoulder region     Pain in left lower leg     Pain of left shoulder region     Panniculitis     Peripheral neuropathy 2019    Pneumococcal vaccination declined     Pneumonia     Primary hypertension     Scabies     Sinusitis, acute     Spindle cell type atypical fibroxanthoma     Sprain of ankle, sequela      Past Surgical  "History:  Past Surgical History:   Procedure Laterality Date    EAR TUBES      GASTRIC SLEEVE LAPAROSCOPIC      HIP SURGERY Right     HIP SLIPPED EPHISLEA    SINUS SURGERY      TONSILLECTOMY       Social Hx:  Social History     Tobacco Use    Smoking status: Former     Packs/day: 0.50     Years: 0.50     Additional pack years: 0.00     Total pack years: 0.25     Types: Cigarettes     Start date: 2022     Quit date: 2023     Years since quittin.4     Passive exposure: Past    Smokeless tobacco: Never   Vaping Use    Vaping Use: Never used   Substance Use Topics    Alcohol use: Not Currently    Drug use: Never     Family Hx:  Family History   Problem Relation Age of Onset    Hypertension Mother     Hyperlipidemia Mother     Diabetes Mother     Kidney disease Mother     Liver disease Mother         Cirrhosis of the liver    Hypertension Father     Arthritis Father     Cancer Father         Skin cancer    Hyperlipidemia Father     Bradycardia Sister     Breast cancer Other     Diabetes Other     Hypertension Other     Lung cancer Other     Heart attack Other     Skin cancer Other     Cancer Maternal Grandfather         Lung Cancer    Cancer Paternal Grandfather         Skin Cancer    Heart disease Paternal Grandfather         2 heart attacks 5 bypasses, congestive heart failure    Hyperlipidemia Paternal Grandfather     Cancer Maternal Aunt         Breast Cancer     Review of Systems:        Review of Systems   Respiratory:  Positive for apnea.         Physical Exam:   BP (!) 180/120 (BP Location: Left arm, Patient Position: Standing, Cuff Size: Adult)   Temp 96.6 °F (35.9 °C) (Infrared)   Ht 198.1 cm (78\")   Wt (!) 158 kg (349 lb)   BMI 40.33 kg/m²   Awake, alert and oriented x 3  Speech f/c  Opens eyes spont  Pupils 3 mm rx bilaterally  Extraocular muscles intact bilaterally  Normal sensation to light touch in all 3 distributions of CN V bilaterally  Face symmetric bilaterally  Tongue midline  5/5 in " the LE's bilaterally  Inferior portion of tailbone is prominent and tender to palpation.     Diagnostic Studies:  All neurological imaging studies were independently reviewed unless stated otherwise    Assessment/Plan:  This is a 43 y.o. male presenting for evaluation of 2 separate issues.  With regards to the patient's lower back and left leg pain, I think this is secondary to a disc herniation at L5-S1 which is seen on his MRI scan.  The distribution of the patient's pain is in an S1 radiculopathy.  This is not as bothersome to him as his other issue which is his tailbone pain.  He does have a prominent coccyx on imaging and physical exam.  It is quite tender to palpation.  I am going to refer him to pain management for discussion of injections around the coccyx to see if this can give him some relief.  With regards to the patient's lumbar pain, I am going to prescribe him physical therapy and he is also going to discuss a possible epidural injection with pain management when he sees them.  We will plan to have the patient follow-up with me in 3 months to see how he is doing.    Diagnoses and all orders for this visit:    1. Lumbar radiculopathy (Primary)    2. Coccyx pain        I explained the importance of smoking cessation to the patient.  Discussed that smoking decreases the efficacy of surgical therapies as well as the general health risk.  I also explained that when contemplating fusion surgeries, smoking decreases fusion rates, leads to poor outcome and contributes to complication rates.  I recommend he follow-up with his primary care to discuss smoking cessation therapies.        Dorian Dueñas MD  01/19/24  12:37 EST

## 2024-01-31 ENCOUNTER — OFFICE VISIT (OUTPATIENT)
Dept: FAMILY MEDICINE CLINIC | Facility: CLINIC | Age: 44
End: 2024-01-31
Payer: COMMERCIAL

## 2024-01-31 VITALS
SYSTOLIC BLOOD PRESSURE: 142 MMHG | HEIGHT: 78 IN | WEIGHT: 315 LBS | RESPIRATION RATE: 16 BRPM | DIASTOLIC BLOOD PRESSURE: 78 MMHG | BODY MASS INDEX: 36.45 KG/M2 | HEART RATE: 78 BPM | OXYGEN SATURATION: 99 % | TEMPERATURE: 97.7 F

## 2024-01-31 DIAGNOSIS — M54.16 LUMBAR BACK PAIN WITH RADICULOPATHY AFFECTING LEFT LOWER EXTREMITY: ICD-10-CM

## 2024-01-31 DIAGNOSIS — I10 PRIMARY HYPERTENSION: Primary | ICD-10-CM

## 2024-01-31 DIAGNOSIS — M53.3 COCCYGEAL PAIN: ICD-10-CM

## 2024-01-31 DIAGNOSIS — Z23 NEED FOR INFLUENZA VACCINATION: ICD-10-CM

## 2024-01-31 DIAGNOSIS — G47.429 NARCOLEPSY DUE TO UNDERLYING CONDITION WITHOUT CATAPLEXY: ICD-10-CM

## 2024-01-31 PROCEDURE — 99214 OFFICE O/P EST MOD 30 MIN: CPT | Performed by: FAMILY MEDICINE

## 2024-01-31 RX ORDER — AMLODIPINE BESYLATE 10 MG/1
10 TABLET ORAL DAILY
Qty: 90 TABLET | Refills: 3 | Status: SHIPPED | OUTPATIENT
Start: 2024-01-31

## 2024-01-31 NOTE — PROGRESS NOTES
Follow Up Office Visit      Date: 2024   Patient Name: Dom Russell  : 1980   MRN: [id number]     Chief Complaint:    Chief Complaint   Patient presents with    Follow-up     On back pain and discuss sleep issue.    Hypertension     Elevated bp at neurosurgeon.       History of Present Illness: Dmo Russell is a 43 y.o. male who is here today for follow-up of numerous chronic conditions.  Patient was recently seen by the neurosurgeon who felt that he does have radiculopathy but we will pursue with trying to treat his coccygeal pain prior to pursuing any other modalities.  Patient was noted to have increase in his blood pressure at that time during the visit.  He has had some elevation in blood pressure intermittently but has not noted any systolic blood pressure greater than 150.  Patient does continue to have pain in the coccyx.  He is having some worsening radiculopathy down his left leg.  He does have an appointment with the pain specialist within the next 2 weeks to address the coccygeal pain.  Patient also had an episode of where he was sitting and then suddenly fell asleep.  He had been in meetings all day but did not have any warning.  He has been wearing his CPAP without any difficulty.  He denies any other issues at present time.  He has otherwise continue with his usual activity, appetite sleep up to this point.  Patient denies any side effects of the medications.  Patient has not had any other cardiovascular, respiratory, gastrointestinal, urologic or neurologic complaints.    Subjective      Review of Systems:   Review of Systems   Constitutional:  Negative for activity change, appetite change and fatigue.   Respiratory:  Negative for cough and shortness of breath.    Cardiovascular:  Negative for chest pain, palpitations and leg swelling.   Gastrointestinal:  Negative for abdominal distention, abdominal pain, blood in stool, constipation, diarrhea, nausea, vomiting,  GERD and indigestion.   Genitourinary:  Negative for dysuria, flank pain, frequency and urgency.   Musculoskeletal:  Positive for arthralgias, back pain and myalgias. Negative for gait problem and joint swelling.   Neurological:  Negative for tremors, syncope, weakness, light-headedness, numbness, headache and memory problem.   Psychiatric/Behavioral:  Negative for sleep disturbance.        I have reviewed the patients family history, social history, past medical history, past surgical history and have updated it as appropriate.     Medications:     Current Outpatient Medications:     albuterol sulfate  (90 Base) MCG/ACT inhaler, Inhale 2 puffs Every 4 (Four) Hours As Needed for Wheezing (Chest tightness)., Disp: 6.7 g, Rfl: 0    benazepril (LOTENSIN) 20 MG tablet, Take 1 tablet by mouth daily, Disp: 90 tablet, Rfl: 3    cetirizine (ZyrTEC) 10 MG chewable tablet, 1 tablet., Disp: , Rfl:     Dulaglutide (Trulicity) 4.5 MG/0.5ML solution pen-injector, Inject 0.5 mL under the skin into the appropriate area as directed 1 (One) Time Per Week., Disp: 2 mL, Rfl: 11    escitalopram (Lexapro) 20 MG tablet, Take 2 tablets by mouth Daily., Disp: 180 tablet, Rfl: 3    fenofibrate micronized (LOFIBRA) 134 MG capsule, 1 capsule., Disp: , Rfl:     metoprolol succinate XL (TOPROL-XL) 50 MG 24 hr tablet, Take 1 tablet by mouth Daily. Patient will need appointment around March 7, 2024., Disp: 90 tablet, Rfl: 0    omeprazole (priLOSEC) 40 MG capsule, Take 1 capsule by mouth Daily. Patient will need appointment around March 7, 2024., Disp: 90 capsule, Rfl: 0    rosuvastatin (CRESTOR) 20 MG tablet, Take 1 tablet by mouth Daily. Patient will need appointment around March 7, 2024., Disp: 90 tablet, Rfl: 0    amLODIPine (NORVASC) 10 MG tablet, Take 1 tablet by mouth Daily., Disp: 90 tablet, Rfl: 3    Allergies:   Allergies   Allergen Reactions    Amoxicillin Hives    Ceftazidime Hives    Cephalosporins Hives and Shortness Of Breath  "   Clarithromycin Hives and Anaphylaxis    Codeine Hives    Penicillins Hives and Seizure    Sulbactam Hives    Unasyn [Ampicillin-Sulbactam Sodium] Hives       Immunizations:   Immunization History   Administered Date(s) Administered    Flu Vaccine Quad PF >36MO 10/18/2021, 10/03/2022    Fluzone (or Fluarix & Flulaval for VFC) >6mos 10/18/2021, 10/03/2022    Fluzone High Dose =>65 Years ([facility] ONLY) 10/30/2017    Fluzone Quad >6mos (Multi-dose) 11/30/2018, 10/01/2019    Hepatitis A 11/01/2018, 11/30/2018, 06/21/2019    Hepatitis B Adult/Adolescent IM 02/14/2006, 06/27/2006, 08/29/2006, 08/31/2020, 11/09/2020    Pneumococcal Conjugate 20-Valent (PCV20) 03/07/2023    Pneumococcal Polysaccharide (PPSV23) 12/20/2011, 06/06/2019    Td (TDVAX) 08/14/1997    Tdap 12/07/2022        Objective     Physical Exam: Please see above  Vital Signs:   Vitals:    01/31/24 1610 01/31/24 1615 01/31/24 1637   BP: 150/90 146/88 142/78   BP Location: Left arm Right arm    Patient Position: Sitting Sitting    Cuff Size: Large Adult Large Adult    Pulse: 78     Resp: 16     Temp: 97.7 °F (36.5 °C)     TempSrc: Temporal     SpO2: 99%     Weight: (!) 158 kg (349 lb 6.4 oz)     Height: 198.1 cm (78\")       Body mass index is 40.38 kg/m².          Physical Exam  Vitals and nursing note reviewed.   Constitutional:       Appearance: Normal appearance.   HENT:      Head: Normocephalic and atraumatic.      Nose: Nose normal.      Mouth/Throat:      Pharynx: Oropharynx is clear.   Eyes:      Extraocular Movements: Extraocular movements intact.      Pupils: Pupils are equal, round, and reactive to light.   Neck:      Thyroid: No thyroid mass or thyromegaly.      Trachea: Trachea normal.   Cardiovascular:      Rate and Rhythm: Normal rate and regular rhythm.      Pulses: Normal pulses. No decreased pulses.      Heart sounds: Normal heart sounds.   Pulmonary:      Effort: Pulmonary effort is normal.      Breath sounds: Normal breath sounds. "   Abdominal:      General: Abdomen is flat. Bowel sounds are normal.      Palpations: Abdomen is soft.      Tenderness: There is no abdominal tenderness.   Musculoskeletal:      Cervical back: Neck supple.      Right lower leg: No edema.      Left lower leg: No edema.   Lymphadenopathy:      Cervical: No cervical adenopathy.   Skin:     General: Skin is warm and dry.   Neurological:      General: No focal deficit present.      Mental Status: He is alert and oriented to person, place, and time.      Sensory: Sensation is intact.      Motor: Motor function is intact.      Coordination: Coordination is intact.   Psychiatric:         Attention and Perception: Attention normal.         Mood and Affect: Mood normal.         Speech: Speech normal.         Behavior: Behavior normal.         Procedures    Results:   Labs:   Hemoglobin A1C   Date Value Ref Range Status   01/09/2024 6.3 (A) 4.5 - 5.7 % Final   03/07/2023 7.00 (H) 4.80 - 5.60 % Final     TSH   Date Value Ref Range Status   12/07/2022 3.100 0.270 - 4.200 uIU/mL Final        POCT Results (if applicable):   Results for orders placed or performed in visit on 01/09/24   POC Microalbumin    Specimen: Urine   Result Value Ref Range    Microalbumin, Urine 30mg/L     Creatinine, Urine 300mg/dl     Lot Number [id number]     Expiration Date 10/17/2024    POC Glycosylated Hemoglobin (Hb A1C)    Specimen: Blood   Result Value Ref Range    Hemoglobin A1C 6.3 (A) 4.5 - 5.7 %    Lot Number [id number]     Expiration Date 10/22/2025        Imaging:   No valid procedures specified.     Measures:   Advanced Care Planning:   Patient does not have an advance directive, information provided.    Smoking Cessation:   Non-smoker.    Assessment / Plan      Assessment/Plan:   Diagnoses and all orders for this visit:    1. Primary hypertension (Primary)  Patient apparently had an episode of increased blood pressure of 180/120 while being seen by the neurosurgeon.  His blood pressure  "since that time has been more acceptable with his blood pressure today being in the 140s over 70s to 80s.  We have discussed options and we will have him continue to monitor his blood pressure closely.  If he has systolic blood pressure less than 130 he will not to anything in that time.  If his blood pressure is between 130 and 150 he will start 5 mg of amlodipine.  If his blood pressure is greater than 150 he will take 10 mg of amlodipine.  He will record for the next several weeks and will let us know what the trend is.  -     amLODIPine (NORVASC) 10 MG tablet; Take 1 tablet by mouth Daily.  Dispense: 90 tablet; Refill: 3    2. Need for influenza vaccination  Patient did receive his flu shot today.  -     Fluzone (or Fluarix & Flulaval for VFC) >6mos    3. Lumbar back pain with radiculopathy affecting left lower extremity   Patient is now having some increase in radiculopathy down his left leg.  He does not wish to start gabapentin at present time especially with the concern of the \"episode\" he had last week.  We will continue to monitor and if he does have worsening symptoms prior to his appointment with the neurosurgeon in April he will contact us.    4. Coccygeal pain   Patient does continue to have significant mount of coccygeal pain.  It is a continuous daily symptomatology.  He is scheduled to see the pain specialist in the near future with hopefully resolution with possible injection excetra.  If he does develop bowel or bladder incontinence he will contact us immediately.    5. Narcolepsy due to underlying condition without cataplexy   Patient did have an interesting \"episode\" that was not associated with anything specific.  He has not had any cataplexy and he did not have dreaming during the episode.  He does not have daily periods of it repressed will need to sleep or daytime lapses into drowsiness or sleep occurring for least 3 months.  Patient states that it does take longer than 8 minutes to fall " asleep.  He believes that the CPAP is beneficial.  Patient feels that he is waking up rested in the morning.  He does not have vivid dreams during his naps.  He does not have cataplexy.  He does not have any hypnagogic hallucinations nor sleep paralysis.  If his symptoms recur it may be necessary for us to obtain a complete sleep study.  We will consider referral to a sleep specialist if it does persist.      Follow Up:   Return if symptoms worsen or fail to improve.      At [facility], we believe that sharing information builds trust and better relationships. You are receiving this note because you recently visited [facility]. It is possible you will see health information before a provider has talked with you about it. This kind of information can be easy to misunderstand. To help you fully understand what it means for your health, we urge you to discuss this note with your provider.    Rigoberto Brand MD  [facility]

## 2024-03-20 ENCOUNTER — TRANSCRIBE ORDERS (OUTPATIENT)
Dept: ADMINISTRATIVE | Facility: HOSPITAL | Age: 44
End: 2024-03-20
Payer: COMMERCIAL

## 2024-03-20 DIAGNOSIS — R10.11 RIGHT UPPER QUADRANT PAIN: Primary | ICD-10-CM

## 2024-04-22 ENCOUNTER — OFFICE VISIT (OUTPATIENT)
Dept: NEUROSURGERY | Facility: CLINIC | Age: 44
End: 2024-04-22
Payer: COMMERCIAL

## 2024-04-22 VITALS — HEIGHT: 78 IN | WEIGHT: 315 LBS | BODY MASS INDEX: 36.45 KG/M2

## 2024-04-22 DIAGNOSIS — M51.37 DEGENERATION OF LUMBAR OR LUMBOSACRAL INTERVERTEBRAL DISC: Primary | ICD-10-CM

## 2024-04-22 PROCEDURE — 99213 OFFICE O/P EST LOW 20 MIN: CPT | Performed by: STUDENT IN AN ORGANIZED HEALTH CARE EDUCATION/TRAINING PROGRAM

## 2024-04-22 NOTE — PROGRESS NOTES
"NEUROSURGERY PROGRESS NOTE    Patient: Dom Russell  : 1980    Primary Care Provider: Rigoberto Brand MD    Chief Complaint: Back and coccyx pain    Subjective: This is a 43-year-old male who had previously evaluated for back pain due to an L5-S1 disc herniation as well as coccyx pain.  After his last appointment, I referred him to pain management where he got an injection around his coccyx.  He states this has dramatically helped his symptoms and his pain is almost 90% gone.  He is not having any issues with sitting.  Since this is improved, he has noticed more lower back pain.  He denies any consistent or significant pain that radiates down the left leg.  He denies any weakness in the leg.  Overall, he is feeling well.  He is scheduled to get a lumbar epidural injection in the near future.    Objective    Vital Signs: Height 198.1 cm (78\"), weight (!) 157 kg (346 lb 1.6 oz).    Physical Exam  Awake, alert and oriented x 3  Opens eyes spont  Pupils 3 mm rx bilat  Extraocular muscles intact bilaterally  Face symmetric bilaterally  Tongue midline  5/5 in the lower extremities bilaterally    Current Medications:   Current Outpatient Medications:     albuterol sulfate  (90 Base) MCG/ACT inhaler, Inhale 2 puffs Every 4 (Four) Hours As Needed for Wheezing (Chest tightness)., Disp: 6.7 g, Rfl: 0    amLODIPine (NORVASC) 10 MG tablet, Take 1 tablet by mouth Daily., Disp: 90 tablet, Rfl: 3    benazepril (LOTENSIN) 20 MG tablet, Take 1 tablet by mouth daily, Disp: 90 tablet, Rfl: 3    cetirizine (ZyrTEC) 10 MG chewable tablet, 1 tablet., Disp: , Rfl:     Dulaglutide (Trulicity) 4.5 MG/0.5ML solution pen-injector, Inject 0.5 mL under the skin into the appropriate area as directed 1 (One) Time Per Week., Disp: 2 mL, Rfl: 11    escitalopram (Lexapro) 20 MG tablet, Take 2 tablets by mouth Daily., Disp: 180 tablet, Rfl: 3    fenofibrate micronized (LOFIBRA) 134 MG capsule, 1 capsule., Disp: , " Rfl:     metoprolol succinate XL (TOPROL-XL) 50 MG 24 hr tablet, Take 1 tablet by mouth Daily. Patient will need appointment around March 7, 2024., Disp: 90 tablet, Rfl: 0    omeprazole (priLOSEC) 40 MG capsule, Take 1 capsule by mouth Daily. Patient will need appointment around March 7, 2024., Disp: 90 capsule, Rfl: 0    rosuvastatin (CRESTOR) 20 MG tablet, Take 1 tablet by mouth Daily. Patient will need appointment around March 7, 2024., Disp: 90 tablet, Rfl: 0     Laboratory Results:                              Brief Urine Lab Results  (Last result in the past 365 days)        Color   Clarity   Blood   Leuk Est   Nitrite   Protein   CREAT   Urine HCG        01/09/24 1211             300mg/dl               Microbiology Results (last 10 days)       ** No results found for the last 240 hours. **            Diagnostic Imaging: I reviewed and independently interpreted the new imaging.     Assessment/Plan:  This is a 43-year-old male who I previously evaluated for lower back pain and coccyx pain.  With regards to the patient's coccyx pain, he has noted dramatic improvement in his symptoms after getting an injection.  He does continue to have some lower back pain, but he denies any consistent or significant radicular pain going into his left leg.  He is full strength on exam.  Due to the lack of lower extremity symptoms, there is no role for surgery on the patient's L5-S1 disc herniation.  I recommended he continue to work with pain management and physical therapy.  I told the patient at this point I do not think we need to schedule further follow-up with me, but he should give me a call if he develops any new or worsening lower extremity symptoms.  The patient understood and was in agreement with the plan.    Diagnoses and all orders for this visit:    1. Degeneration of lumbar or lumbosacral intervertebral disc (Primary)      Dom Russell  reports that he quit smoking about 8 months ago. His smoking use  included cigarettes. He started smoking about 17 months ago. He has a 0.4 pack-year smoking history. He has been exposed to tobacco smoke. He has never used smokeless tobacco.     Dorian Dueñas MD  04/22/24  16:00 EDT

## 2024-04-23 ENCOUNTER — HOSPITAL ENCOUNTER (OUTPATIENT)
Dept: ULTRASOUND IMAGING | Facility: HOSPITAL | Age: 44
Discharge: HOME | End: 2024-04-23
Admitting: NURSE PRACTITIONER
Payer: COMMERCIAL

## 2024-04-23 DIAGNOSIS — R10.11 RIGHT UPPER QUADRANT PAIN: ICD-10-CM

## 2024-04-23 PROCEDURE — 76705 ECHO EXAM OF ABDOMEN: CPT

## 2024-04-24 ENCOUNTER — TELEPHONE (OUTPATIENT)
Dept: FAMILY MEDICINE CLINIC | Facility: CLINIC | Age: 44
End: 2024-04-24
Payer: COMMERCIAL

## 2024-04-24 DIAGNOSIS — K21.9 CHRONIC GERD: ICD-10-CM

## 2024-04-24 DIAGNOSIS — I10 PRIMARY HYPERTENSION: ICD-10-CM

## 2024-04-24 RX ORDER — ROSUVASTATIN CALCIUM 20 MG/1
20 TABLET, COATED ORAL DAILY
Qty: 90 TABLET | Refills: 0 | Status: SHIPPED | OUTPATIENT
Start: 2024-04-24

## 2024-04-24 RX ORDER — AMLODIPINE BESYLATE 10 MG/1
10 TABLET ORAL DAILY
Qty: 90 TABLET | Refills: 3 | Status: SHIPPED | OUTPATIENT
Start: 2024-04-24

## 2024-04-24 RX ORDER — OMEPRAZOLE 40 MG/1
40 CAPSULE, DELAYED RELEASE ORAL DAILY
Qty: 90 CAPSULE | Refills: 0 | Status: SHIPPED | OUTPATIENT
Start: 2024-04-24

## 2024-04-24 RX ORDER — METOPROLOL SUCCINATE 50 MG/1
50 TABLET, EXTENDED RELEASE ORAL DAILY
Qty: 90 TABLET | Refills: 0 | Status: SHIPPED | OUTPATIENT
Start: 2024-04-24

## 2024-04-24 NOTE — TELEPHONE ENCOUNTER
Pharmacy Name:      [facility] - ALEXANRDO Morton [address] - [phone] [facility] [phone] (Pharmacy)       Pharmacy representative name: PURNIMA    Pharmacy representative phone number: [phone]    What medication are you calling in regards to: amLODIPine (NORVASC) 10 MG tablet     What question does the pharmacy have: PATIENT STATED THAT DR PRINCE TOLD HIM TO INCREASE THE DOSAGE ON THE AMLODIPINE.  PURNIMA ASKED FOR A NEW PRESCRIPTION STATING THIS.     Who is the provider that prescribed the medication: DR PRINCE

## 2024-04-25 ENCOUNTER — TELEPHONE (OUTPATIENT)
Dept: FAMILY MEDICINE CLINIC | Facility: CLINIC | Age: 44
End: 2024-04-25

## 2024-04-25 NOTE — TELEPHONE ENCOUNTER
Pharmacy Name: [facility] - ALEXANDRO GONZALEZ [address] - [phone] [facility] [phone]      Pharmacy representative name: FREDO    Pharmacy representative phone number: [phone]     What medication are you calling in regards to: AMLODIPINE    What question does the pharmacy have: PATIENT ADVISED THAT HE WAS INSTRUCTED TO TAKE THIS MEDICATION 2 TIMES PER DAY AND PHARMACY WOULD LIKE TO KNOW IF PATIENT SHOULD BE TAKING 10MG PER DAY OR 10MG TWICE A DAY.

## 2024-06-05 ENCOUNTER — OFFICE VISIT (OUTPATIENT)
Dept: FAMILY MEDICINE CLINIC | Facility: CLINIC | Age: 44
End: 2024-06-05
Payer: COMMERCIAL

## 2024-06-05 VITALS
DIASTOLIC BLOOD PRESSURE: 82 MMHG | HEIGHT: 77 IN | WEIGHT: 315 LBS | TEMPERATURE: 97.1 F | OXYGEN SATURATION: 98 % | HEART RATE: 65 BPM | SYSTOLIC BLOOD PRESSURE: 138 MMHG | BODY MASS INDEX: 37.19 KG/M2

## 2024-06-05 DIAGNOSIS — F32.A ANXIETY AND DEPRESSION: ICD-10-CM

## 2024-06-05 DIAGNOSIS — E66.01 MORBID (SEVERE) OBESITY DUE TO EXCESS CALORIES: ICD-10-CM

## 2024-06-05 DIAGNOSIS — K21.9 CHRONIC GERD: ICD-10-CM

## 2024-06-05 DIAGNOSIS — Z00.00 WELL ADULT EXAM: Primary | ICD-10-CM

## 2024-06-05 DIAGNOSIS — F41.9 ANXIETY AND DEPRESSION: ICD-10-CM

## 2024-06-05 DIAGNOSIS — I10 PRIMARY HYPERTENSION: ICD-10-CM

## 2024-06-05 DIAGNOSIS — E11.9 TYPE 2 DIABETES MELLITUS WITHOUT COMPLICATION, WITHOUT LONG-TERM CURRENT USE OF INSULIN: ICD-10-CM

## 2024-06-05 DIAGNOSIS — F43.10 PTSD (POST-TRAUMATIC STRESS DISORDER): ICD-10-CM

## 2024-06-05 DIAGNOSIS — E78.2 MIXED HYPERLIPIDEMIA: ICD-10-CM

## 2024-06-05 PROBLEM — M53.3 PAIN IN THE COCCYX: Status: ACTIVE | Noted: 2024-03-01

## 2024-06-05 PROBLEM — M54.16 LUMBAR RADICULOPATHY: Status: ACTIVE | Noted: 2024-03-01

## 2024-06-05 PROBLEM — E66.3 OVERWEIGHT: Status: ACTIVE | Noted: 2024-03-01

## 2024-06-05 PROBLEM — M47.816 LUMBAR SPONDYLOSIS: Status: ACTIVE | Noted: 2024-03-01

## 2024-06-05 PROCEDURE — 99214 OFFICE O/P EST MOD 30 MIN: CPT | Performed by: FAMILY MEDICINE

## 2024-06-05 PROCEDURE — 99396 PREV VISIT EST AGE 40-64: CPT | Performed by: FAMILY MEDICINE

## 2024-06-05 RX ORDER — IBUPROFEN 800 MG/1
1 TABLET ORAL EVERY 6 HOURS PRN
COMMUNITY
End: 2024-06-05

## 2024-06-05 RX ORDER — SEMAGLUTIDE 2.68 MG/ML
2 INJECTION, SOLUTION SUBCUTANEOUS WEEKLY
Qty: 3 ML | Refills: 11 | Status: SHIPPED | OUTPATIENT
Start: 2024-06-05

## 2024-06-05 RX ORDER — CELECOXIB 100 MG/1
CAPSULE ORAL
COMMUNITY
Start: 2024-05-17 | End: 2024-06-05 | Stop reason: SDUPTHER

## 2024-06-05 RX ORDER — BLOOD-GLUCOSE SENSOR
1 EACH MISCELLANEOUS
Qty: 2 EACH | Refills: 11 | Status: SHIPPED | OUTPATIENT
Start: 2024-06-05

## 2024-06-05 RX ORDER — GABAPENTIN 300 MG/1
CAPSULE ORAL
COMMUNITY
Start: 2024-05-17 | End: 2024-06-05

## 2024-06-05 NOTE — PROGRESS NOTES
Male Physical Note      Date: 2024   Patient Name: Dom Russell  : 1980   MRN: [id number]     Chief Complaint:    Chief Complaint   Patient presents with    Follow-up     Discuss medication; chest congestion       History of Present Illness: Dom Russell is a 43 y.o. male who is here today for their annual health maintenance and physical.  Patient has been doing relatively well recently but is having difficulty finding his GLP-1 agonist at the pharmacy.  Apparently there has been shortages.  Patient has noted that since he has not been taking medication his appetite has increased.  He has otherwise been able to continue with the other medications as prescribed.  He is having some difficulty with respect to his anxiety/depressive symptomatology but it does appear to be consistent with PTSD.  He is pursuing counseling which does appear to be beneficial.  Otherwise he has continue to take his medications as prescribed without side effects.  He has not had any change in his usual activity, appetite and sleep.  Patient denies any other cardiovascular, respiratory, gastrointestinal, urologic or neurologic complaints.  He does continue to have high stress and demanding jobs from being the [employer].      Subjective      Review of Systems:   Review of Systems   Constitutional:  Negative for activity change, appetite change and fatigue.   Respiratory:  Negative for cough, chest tightness, shortness of breath and wheezing.    Cardiovascular:  Negative for chest pain, palpitations and leg swelling.   Gastrointestinal:  Negative for abdominal distention, abdominal pain, blood in stool, constipation, diarrhea, nausea, vomiting and GERD.   Genitourinary:  Negative for dysuria, flank pain, frequency and urgency.   Musculoskeletal:  Negative for arthralgias and myalgias.   Neurological:  Negative for dizziness, tremors, seizures, syncope, weakness, light-headedness,  numbness, headache and memory problem.   Psychiatric/Behavioral:  Negative for dysphoric mood, sleep disturbance and depressed mood. The patient is not nervous/anxious.        Past Medical History, Social History, Family History and Care Team were all reviewed with patient and updated as appropriate.     Medications:     Current Outpatient Medications:     amLODIPine (NORVASC) 10 MG tablet, Take 1 tablet by mouth Daily., Disp: 90 tablet, Rfl: 3    benazepril (LOTENSIN) 20 MG tablet, Take 1 tablet by mouth daily, Disp: 90 tablet, Rfl: 3    cetirizine (ZyrTEC) 10 MG chewable tablet, 1 tablet., Disp: , Rfl:     escitalopram (Lexapro) 20 MG tablet, Take 2 tablets by mouth Daily., Disp: 180 tablet, Rfl: 3    fenofibrate micronized (LOFIBRA) 134 MG capsule, 1 capsule., Disp: , Rfl:     metoprolol succinate XL (TOPROL-XL) 50 MG 24 hr tablet, Take 1 tablet by mouth Daily. Patient will need appointment around March 7, 2024., Disp: 90 tablet, Rfl: 0    omeprazole (priLOSEC) 40 MG capsule, Take 1 capsule by mouth Daily. Patient will need appointment around March 7, 2024., Disp: 90 capsule, Rfl: 0    rosuvastatin (CRESTOR) 20 MG tablet, Take 1 tablet by mouth Daily. Patient will need appointment around March 7, 2024., Disp: 90 tablet, Rfl: 0    Continuous Glucose Sensor (FreeStyle Wen 3 Sensor) misc, Use 1 each Every 14 (Fourteen) Days., Disp: 2 each, Rfl: 11    Semaglutide, 2 MG/DOSE, (Ozempic, 2 MG/DOSE,) 8 MG/3ML solution pen-injector, Inject 2 mg under the skin into the appropriate area as directed 1 (One) Time Per Week., Disp: 3 mL, Rfl: 11    Allergies:   Allergies   Allergen Reactions    Amoxicillin Hives    Ceftazidime Hives    Cephalosporins Hives and Shortness Of Breath    Clarithromycin Hives and Anaphylaxis    Codeine Hives    Penicillins Hives and Seizure    Sulbactam Hives    Unasyn [Ampicillin-Sulbactam Sodium] Hives       Immunizations:  Health Maintenance Summary            Ordered - LIPID PANEL (Yearly)  Ordered on 6/5/2024 03/07/2023  Lipid Panel    12/07/2022  Lipid Panel    04/25/2022  Done    11/23/2020  Lipid Panel    11/23/2020  LDL Cholesterol, Direct    Only the first 5 history entries have been loaded, but more history exists.              DIABETIC EYE EXAM (Yearly) Due soon on 6/7/2024 06/07/2023  Done    12/17/2018  RETINAVUE - DIABETIC EYE EXAM              Ordered - HEMOGLOBIN A1C (Every 6 Months) Ordered on 6/5/2024 01/09/2024  Hemoglobin A1C component of POC Glycosylated Hemoglobin (Hb A1C)    10/05/2023  Hemoglobin A1C component of POC Glycosylated Hemoglobin (Hb A1C)    06/19/2023  Hemoglobin A1C component of POC Glycosylated Hemoglobin (Hb A1C)    03/07/2023  Hemoglobin A1C component of Hemoglobin A1c    12/07/2022  Hemoglobin A1C component of Hemoglobin A1c    Only the first 5 history entries have been loaded, but more history exists.              Postponed - COVID-19 Vaccine (1 - 2023-24 season) Postponed until 6/5/2025 06/05/2024  Postponed until 6/5/2025 by Elaina Ledezma MA (Patient Refused)    03/07/2023  Postponed until 3/26/2024 by Rigoberto Brand MD (Pending event - Advised.)    12/07/2022  Postponed until 12/9/2022 by Rigoberto Brand MD (Patient Refused)              INFLUENZA VACCINE (Yearly - August to March) Next due on 8/1/2024 12/28/2023  Imm Admin: Influenza, Unspecified    10/03/2022  Imm Admin: Fluzone (or Fluarix & Flulaval for VFC) >6mos    10/03/2022  Imm Admin: Flu Vaccine Quad PF >36MO    10/18/2021  Imm Admin: Fluzone (or Fluarix & Flulaval for VFC) >6mos    10/18/2021  Imm Admin: Flu Vaccine Quad PF >36MO    Only the first 5 history entries have been loaded, but more history exists.              Ordered - URINE MICROALBUMIN (Yearly) Ordered on 6/5/2024 01/09/2024  Microalbumin, Urine component of POC Microalbumin    12/07/2022  Microalbumin, Urine component of MicroAlbumin, Urine, Random - Urine, Clean Catch               ANNUAL PHYSICAL (Yearly) Next due on 6/5/2025 06/05/2024  Done    03/07/2023  Done              DIABETIC FOOT EXAM (Yearly) Next due on 6/5/2025 06/05/2024  Done    12/07/2022  Done              BMI FOLLOWUP (Yearly) Next due on 6/5/2025 06/05/2024  Registry Metric: BMI Follow-up    05/23/2023  SmartData: WORKFLOW - QUALITY MEASUREMENT - BMI FOLLOW UP CARE PLAN DOCUMENTED    05/23/2023  SmartData: WORKFLOW - QUALITY MEASUREMENT - DOCUMENTED WEIGHT FOLLOW-UP PLAN    03/07/2023  SmartData: WORKFLOW - QUALITY MEASUREMENT - BMI FOLLOW UP CARE PLAN DOCUMENTED    03/07/2023  SmartData: WORKFLOW - QUALITY MEASUREMENT - DOCUMENTED WEIGHT FOLLOW-UP PLAN    Only the first 5 history entries have been loaded, but more history exists.              TDAP/TD VACCINES (3 - Td or Tdap) Next due on 12/7/2032 12/07/2022  Imm Admin: Tdap    08/14/1997  Imm Admin: Td (TDVAX)              Hepatitis B (Series Information) Completed      11/09/2020  Imm Admin: Hepatitis B Adult/Adolescent IM    08/31/2020  Imm Admin: Hepatitis B Adult/Adolescent IM    08/29/2006  Imm Admin: Hepatitis B Adult/Adolescent IM    06/27/2006  Imm Admin: Hepatitis B Adult/Adolescent IM    02/14/2006  Imm Admin: Hepatitis B Adult/Adolescent IM    Only the first 5 history entries have been loaded, but more history exists.              HEPATITIS C SCREENING  Completed      12/07/2022  Hepatitis C Antibody              Pneumococcal Vaccine 0-64 (Series Information) Completed      03/07/2023  Imm Admin: Pneumococcal Conjugate 20-Valent (PCV20)    06/06/2019  Imm Admin: Pneumococcal Polysaccharide (PPSV23)    12/20/2011  Imm Admin: Pneumococcal Polysaccharide (PPSV23)                    No orders of the defined types were placed in this encounter.      Colorectal Screening:   Deferred.  Last Completed Colonoscopy       This patient has no relevant Health Maintenance data.          CT for Smoker (Age 50-80, 20pk yr within last 15 years):  "Deferred.  Bone Density/DEXA (high risk): Deferred.  Hep C (Age 18-79 once): Previously ordered.  HIV (Age 15-65 once) : Deferred.  PSA (Over age 50, C Level Recommendation): Deferred.  US Aorta (For male smokers, age 65): Deferred.  A1c:   Hemoglobin A1C   Date Value Ref Range Status   01/09/2024 6.3 (A) 4.5 - 5.7 % Final   03/07/2023 7.00 (H) 4.80 - 5.60 % Final     Lipid panel: No results found for: \"LABLIPI\"    The 10-year ASCVD risk score (Francisca SANTACRUZ, et al., 2019) is: 4%    Values used to calculate the score:      Age: 43 years      Sex: Male      Is Non- : No      Diabetic: Yes      Tobacco smoker: No      Systolic Blood Pressure: 138 mmHg      Is BP treated: Yes      HDL Cholesterol: 27 mg/dL      Total Cholesterol: 142 mg/dL    Dermatology: Advised if necessary.  Ophthalmologist: Up-to-date.  Dentist: Up-to-date.    Tobacco Use: Medium Risk (6/5/2024)    Patient History     Smoking Tobacco Use: Former     Smokeless Tobacco Use: Never     Passive Exposure: Past       Social History     Substance and Sexual Activity   Alcohol Use Not Currently        Social History     Substance and Sexual Activity   Drug Use Never        Diet/Physical activity: Well-balanced diet/daily exercise.    Sexual health: No issues at present time.    PHQ-2 Depression Screening  PHQ-9 Total Score: 10      Measures:   Advanced Care Planning:   Patient does not have an advance directive, information provided.    Smoking Cessation:   Non-smoker.     Objective     Physical Exam:  Vital Signs:   Vitals:    06/05/24 1554 06/05/24 1604 06/05/24 1628   BP: (!) 180/130 (!) 200/120  Comment: rt arm 138/82   Pulse: 65     Temp: 97.1 °F (36.2 °C)     SpO2: 98%     Weight: (!) 160 kg (353 lb)     Height: 195.6 cm (77\")       Body mass index is 41.86 kg/m².     Physical Exam  Vitals and nursing note reviewed.   Constitutional:       Appearance: Normal appearance.   HENT:      Head: Normocephalic and atraumatic.      Nose: " Nose normal.      Mouth/Throat:      Pharynx: Oropharynx is clear.   Eyes:      Extraocular Movements: Extraocular movements intact.      Pupils: Pupils are equal, round, and reactive to light.   Neck:      Thyroid: No thyroid mass or thyromegaly.      Trachea: Trachea normal.   Cardiovascular:      Rate and Rhythm: Normal rate and regular rhythm.      Pulses: Normal pulses. No decreased pulses.           Dorsalis pedis pulses are 2+ on the right side and 2+ on the left side.        Posterior tibial pulses are 2+ on the right side and 2+ on the left side.      Heart sounds: Normal heart sounds.   Pulmonary:      Effort: Pulmonary effort is normal.      Breath sounds: Normal breath sounds.   Abdominal:      General: Abdomen is flat. Bowel sounds are normal.      Palpations: Abdomen is soft.      Tenderness: There is no abdominal tenderness.   Musculoskeletal:      Cervical back: Neck supple.      Right lower leg: No edema.      Left lower leg: No edema.      Right foot: No deformity.      Left foot: No deformity.   Feet:      Right foot:      Protective Sensation: 5 sites tested.  5 sites sensed.      Skin integrity: Skin integrity normal. Callus present. No ulcer, skin breakdown or erythema.      Toenail Condition: Fungal disease present.     Left foot:      Protective Sensation: 5 sites tested.  5 sites sensed.      Skin integrity: Skin integrity normal. Callus present. No ulcer, skin breakdown or erythema.      Toenail Condition: Left toenails are normal.   Lymphadenopathy:      Cervical: No cervical adenopathy.   Skin:     General: Skin is warm and dry.   Neurological:      General: No focal deficit present.      Mental Status: He is alert and oriented to person, place, and time.      Sensory: Sensation is intact.      Motor: Motor function is intact.      Coordination: Coordination is intact.   Psychiatric:         Attention and Perception: Attention normal.         Mood and Affect: Mood normal.         Speech:  Speech normal.         Behavior: Behavior normal.          POCT Results (if applicable):   Results for orders placed or performed in visit on 01/09/24   POC Microalbumin    Specimen: Urine   Result Value Ref Range    Microalbumin, Urine 30mg/L     Creatinine, Urine 300mg/dl     Lot Number [id number]     Expiration Date 10/17/2024    POC Glycosylated Hemoglobin (Hb A1C)    Specimen: Blood   Result Value Ref Range    Hemoglobin A1C 6.3 (A) 4.5 - 5.7 %    Lot Number [id number]     Expiration Date 10/22/2025        Procedures    Assessment / Plan      Assessment/Plan:   Diagnoses and all orders for this visit:    1. Well adult exam (Primary)  Patient did have a wellness exam performed today.  His anxiety/depression scores were noted.  We did discuss anticipatory guidance as well as safety concerns.  We will continue to monitor symptoms and obtain laboratory data and will reassess.  If he has any difficulty or other complaints prior to his next scheduled follow-up he will contact us.  -     CBC (No Diff); Future  -     Comprehensive Metabolic Panel; Future  -     Hemoglobin A1c; Future  -     Lipid Panel; Future  -     Urinalysis without microscopic (no culture) - Urine, Clean Catch; Future  -     MicroAlbumin, Urine, Random - Urine, Clean Catch; Future  -     Vitamin B12; Future  -     TSH Rfx On Abnormal To Free T4; Future  -     MA Fibrosure; Future    2. Type 2 diabetes mellitus without complication, without long-term current use of insulin  Patient has had difficulty getting the Trulicity from the pharmacy.  We have discussed options and switch him over to Ozempic as this is more readily available.  Since he has not been on any medication for quite some time we will initiate a dose of 0.5 mg weekly and increase to the maximum dose of 2 mg weekly if required.  Patient understands that this is a tool to help suppress his appetite and that he will have to have some self-control included in his treatment options.   He will continue with his low carbohydrate/low-fat diet.  We will increase his aerobic exercise to 300 minutes weekly.  We will obtain his hemoglobin A1c and will make adjustments based on these findings.  He did have a diabetic foot exam that did not reveal any abnormality except for significant callus formation as well as fungal disease.  Patient does follow-up with a podiatrist and will continue to do so.     Semaglutide, 2 MG/DOSE, (Ozempic, 2 MG/DOSE,) 8 MG/3ML solution pen-injector; Inject 2 mg under the skin into the appropriate area as directed 1 (One) Time Per Week.  Dispense: 3 mL; Refill: 11  -     Continuous Glucose Sensor (FreeStyle Wen 3 Sensor) misc; Use 1 each Every 14 (Fourteen) Days.  Dispense: 2 each; Refill: 11    3. Mixed hyperlipidemia   Patient will have his lipid profile obtained as well as a [employer] test.  We will make adjustments in his cholesterol medications to keep his LDL less than 70.    4. Primary hypertension   Patient's blood pressure is doing well at present time.  No adjustments are necessary.  He did have initial elevation while in the clinic but this was due to a stress response with reflection of previous PTSD symptomatology.  When patient was distracted and was given time to relax his blood pressure was felt to be normal.  We will make no adjustments at present time.    5. Chronic GERD   Patient does continue to have GERD symptomatology.  We will continue with his current regimen and will monitor and will treat currently.  If he does develop alarm symptoms referral for an EGD will be necessary.    6. Morbid (severe) obesity due to excess calories   Patient has not had any problems with respect to tolerance of his previous GLP-1 agonist but has unfortunately been unable to receive it through the pharmacy.  We will switch over to Ozempic and will monitor.    7. Anxiety and depression   Patient believes that his anxiety and depression are doing relatively well but is  having problems with PTSD.  We have discussed options and will refer him to behavioral health specialist since he has been on several medications in the past with out rapid improvement.    8. PTSD (post-traumatic stress disorder)  -     Ambulatory Referral to Behavioral Health         Healthcare Maintenance:  Counseling provided based on age appropriate USPSTF guidelines.       Dom Russell voices understanding and acceptance of this advice and will call back with any further questions or concerns. AVS with preventive healthcare tips printed for patient.     Follow Up:   Return in about 3 months (around 9/5/2024).    At [facility], we believe that sharing information builds trust and better relationships. You are receiving this note because you recently visited [facility]. It is possible you will see health information before a provider has talked with you about it. This kind of information can be easy to misunderstand. To help you fully understand what it means for your health, we urge you to discuss this note with your provider.    Rigoberto Brand MD  [facility]

## 2024-07-24 DIAGNOSIS — K21.9 CHRONIC GERD: ICD-10-CM

## 2024-07-24 RX ORDER — OMEPRAZOLE 40 MG/1
40 CAPSULE, DELAYED RELEASE ORAL DAILY
Qty: 90 CAPSULE | Refills: 0 | Status: SHIPPED | OUTPATIENT
Start: 2024-07-24

## 2024-07-24 RX ORDER — ROSUVASTATIN CALCIUM 20 MG/1
20 TABLET, COATED ORAL DAILY
Qty: 90 TABLET | Refills: 0 | Status: SHIPPED | OUTPATIENT
Start: 2024-07-24

## 2024-07-24 RX ORDER — METOPROLOL SUCCINATE 50 MG/1
50 TABLET, EXTENDED RELEASE ORAL DAILY
Qty: 90 TABLET | Refills: 0 | Status: SHIPPED | OUTPATIENT
Start: 2024-07-24

## 2024-08-14 ENCOUNTER — OFFICE VISIT (OUTPATIENT)
Dept: FAMILY MEDICINE CLINIC | Facility: CLINIC | Age: 44
End: 2024-08-14
Payer: COMMERCIAL

## 2024-08-14 VITALS
BODY MASS INDEX: 37.19 KG/M2 | DIASTOLIC BLOOD PRESSURE: 88 MMHG | RESPIRATION RATE: 18 BRPM | TEMPERATURE: 98.2 F | WEIGHT: 315 LBS | SYSTOLIC BLOOD PRESSURE: 142 MMHG | OXYGEN SATURATION: 99 % | HEIGHT: 77 IN | HEART RATE: 72 BPM

## 2024-08-14 DIAGNOSIS — E78.2 MIXED HYPERLIPIDEMIA: ICD-10-CM

## 2024-08-14 DIAGNOSIS — E11.9 TYPE 2 DIABETES MELLITUS WITHOUT COMPLICATION, WITHOUT LONG-TERM CURRENT USE OF INSULIN: Primary | ICD-10-CM

## 2024-08-14 DIAGNOSIS — F41.9 ANXIETY AND DEPRESSION: ICD-10-CM

## 2024-08-14 DIAGNOSIS — I10 PRIMARY HYPERTENSION: ICD-10-CM

## 2024-08-14 DIAGNOSIS — R22.32 ELBOW MASS, LEFT: ICD-10-CM

## 2024-08-14 DIAGNOSIS — F32.A ANXIETY AND DEPRESSION: ICD-10-CM

## 2024-08-14 PROCEDURE — 99214 OFFICE O/P EST MOD 30 MIN: CPT | Performed by: FAMILY MEDICINE

## 2024-08-14 RX ORDER — BUSPIRONE HYDROCHLORIDE 5 MG/1
5 TABLET ORAL 3 TIMES DAILY
Qty: 90 TABLET | Refills: 3 | Status: SHIPPED | OUTPATIENT
Start: 2024-08-14

## 2024-08-14 RX ORDER — DULAGLUTIDE 4.5 MG/.5ML
INJECTION, SOLUTION SUBCUTANEOUS
COMMUNITY
Start: 2024-07-10

## 2024-08-14 NOTE — PROGRESS NOTES
Follow Up Office Visit      Date: 2024   Patient Name: Dom Russell  : 1980   MRN: [id number]     Chief Complaint:    Chief Complaint   Patient presents with    Joint Swelling     Knot in area of left elbow x 2 weeks. Painful the last 2 days.       History of Present Illness: Dom Russell is a 44 y.o. male who is here today for evaluation of a small knot on his left elbow.  Patient states that he has had this for approximately 2 weeks and has had some problems with pain recently.  He has a little bit of bruise but does not understand how he developed this at present time.  He has been able to use his elbow without difficulty.  He has not had any trauma that he knows of.  He has not had any erythema warmth or other complaints.  He is having some problems with respect to increase in his anxiety due to stress.  He does tolerate his medication as prescribed without side effects.  He has yet to have his laboratory data obtained.  Patient appears to be doing otherwise well.  They have continue with their medications without any side effects.  They have not had any changes in their usual activity, appetite and sleep.  Patient denies any other cardiovascular, respiratory, gastrointestinal, urologic or neurologic complaints.    Subjective      Review of Systems:   Review of Systems   Constitutional:  Negative for activity change, appetite change and fatigue.   Respiratory:  Negative for cough, chest tightness, shortness of breath and wheezing.    Cardiovascular:  Negative for chest pain, palpitations and leg swelling.   Gastrointestinal:  Negative for abdominal distention, abdominal pain, blood in stool, constipation, diarrhea, nausea, vomiting, GERD and indigestion.   Genitourinary:  Negative for difficulty urinating, dysuria, flank pain, frequency, hematuria and urgency.   Musculoskeletal:  Positive for arthralgias. Negative for back pain, gait problem, joint swelling and myalgias.    Neurological:  Negative for dizziness, tremors, seizures, syncope, weakness, light-headedness, numbness, headache and memory problem.   Psychiatric/Behavioral:  Negative for sleep disturbance and depressed mood. The patient is nervous/anxious.        I have reviewed the patients family history, social history, past medical history, past surgical history and have updated it as appropriate.     Medications:     Current Outpatient Medications:     amLODIPine (NORVASC) 10 MG tablet, Take 1 tablet by mouth Daily., Disp: 90 tablet, Rfl: 3    benazepril (LOTENSIN) 20 MG tablet, Take 1 tablet by mouth daily, Disp: 90 tablet, Rfl: 3    cetirizine (ZyrTEC) 10 MG chewable tablet, 1 tablet., Disp: , Rfl:     Continuous Glucose Sensor (FreeStyle Wen 3 Sensor) misc, Use 1 each Every 14 (Fourteen) Days., Disp: 2 each, Rfl: 11    escitalopram (Lexapro) 20 MG tablet, Take 2 tablets by mouth Daily., Disp: 180 tablet, Rfl: 3    fenofibrate micronized (LOFIBRA) 134 MG capsule, 1 capsule., Disp: , Rfl:     metoprolol succinate XL (TOPROL-XL) 50 MG 24 hr tablet, Take 1 tablet by mouth Daily., Disp: 90 tablet, Rfl: 0    omeprazole (priLOSEC) 40 MG capsule, Take 1 capsule by mouth Daily., Disp: 90 capsule, Rfl: 0    rosuvastatin (CRESTOR) 20 MG tablet, Take 1 tablet by mouth Daily., Disp: 90 tablet, Rfl: 0    Semaglutide, 2 MG/DOSE, (Ozempic, 2 MG/DOSE,) 8 MG/3ML solution pen-injector, Inject 2 mg under the skin into the appropriate area as directed 1 (One) Time Per Week., Disp: 3 mL, Rfl: 11    Trulicity 4.5 MG/0.5ML solution pen-injector, Inject 0.5 mL under the skin into the appropriate area as directed 1 (One) Time Per Week., Disp: , Rfl:     busPIRone (BUSPAR) 5 MG tablet, Take 1 tablet by mouth 3 (Three) Times a Day., Disp: 90 tablet, Rfl: 3    Allergies:   Allergies   Allergen Reactions    Amoxicillin Hives    Ceftazidime Hives    Cephalosporins Hives and Shortness Of Breath    Clarithromycin Hives and Anaphylaxis    Codeine  "Hives    Penicillins Hives and Seizure    Sulbactam Hives    Unasyn [Ampicillin-Sulbactam Sodium] Hives       Immunizations:   Immunization History   Administered Date(s) Administered    Flu Vaccine Quad PF >36MO 10/18/2021, 10/03/2022    Fluzone (or Fluarix & Flulaval for VFC) >6mos 10/18/2021, 10/03/2022    Fluzone High-Dose 65+YRS 10/30/2017    Fluzone Quad >6mos (Multi-dose) 11/30/2018, 10/01/2019    Hepatitis A 11/01/2018, 11/30/2018, 06/21/2019    Hepatitis B Adult/Adolescent IM 02/14/2006, 06/27/2006, 08/29/2006, 08/31/2020, 11/09/2020    Influenza, Unspecified 12/28/2023    Pneumococcal Conjugate 20-Valent (PCV20) 03/07/2023    Pneumococcal Polysaccharide (PPSV23) 12/20/2011, 06/06/2019    Td (TDVAX) 08/14/1997    Tdap 12/07/2022        Objective     Physical Exam: Please see above  Vital Signs:   Vitals:    08/14/24 1651 08/14/24 1653   BP: 148/86 142/88   BP Location: Left arm Right arm   Patient Position: Sitting Sitting   Cuff Size: Large Adult Large Adult   Pulse: 72    Resp: 18    Temp: 98.2 °F (36.8 °C)    TempSrc: Temporal    SpO2: 99%    Weight: (!) 158 kg (348 lb 9.6 oz)    Height: 195.6 cm (77\")      Body mass index is 41.34 kg/m².          Physical Exam  Vitals and nursing note reviewed.   Constitutional:       Appearance: Normal appearance.   HENT:      Head: Normocephalic and atraumatic.      Nose: Nose normal.      Mouth/Throat:      Pharynx: Oropharynx is clear.   Eyes:      Extraocular Movements: Extraocular movements intact.      Pupils: Pupils are equal, round, and reactive to light.   Neck:      Thyroid: No thyroid mass or thyromegaly.      Trachea: Trachea normal.   Cardiovascular:      Rate and Rhythm: Normal rate and regular rhythm.      Pulses: Normal pulses. No decreased pulses.      Heart sounds: Normal heart sounds.   Pulmonary:      Effort: Pulmonary effort is normal.      Breath sounds: Normal breath sounds.   Abdominal:      General: Abdomen is flat. Bowel sounds are normal.     "  Palpations: Abdomen is soft.      Tenderness: There is no abdominal tenderness.   Musculoskeletal:      Left elbow: No swelling. Normal range of motion. Tenderness present in lateral epicondyle.      Cervical back: Neck supple.      Right lower leg: No edema.      Left lower leg: No edema.      Comments: Patient does have a small BB like object underneath the skin near his left lateral epicondyle region.   Lymphadenopathy:      Cervical: No cervical adenopathy.   Skin:     General: Skin is warm and dry.   Neurological:      General: No focal deficit present.      Mental Status: He is alert and oriented to person, place, and time.      Sensory: Sensation is intact.      Motor: Motor function is intact.      Coordination: Coordination is intact.   Psychiatric:         Attention and Perception: Attention normal.         Mood and Affect: Mood normal.         Speech: Speech normal.         Behavior: Behavior normal.         Procedures    Results:   Labs:   Hemoglobin A1C   Date Value Ref Range Status   01/09/2024 6.3 (A) 4.5 - 5.7 % Final   03/07/2023 7.00 (H) 4.80 - 5.60 % Final     TSH   Date Value Ref Range Status   12/07/2022 3.100 0.270 - 4.200 uIU/mL Final        POCT Results (if applicable):   Results for orders placed or performed in visit on 01/09/24   POC Microalbumin    Specimen: Urine   Result Value Ref Range    Microalbumin, Urine 30mg/L     Creatinine, Urine 300mg/dl     Lot Number [id number]     Expiration Date 10/17/2024    POC Glycosylated Hemoglobin (Hb A1C)    Specimen: Blood   Result Value Ref Range    Hemoglobin A1C 6.3 (A) 4.5 - 5.7 %    Lot Number [id number]     Expiration Date 10/22/2025        Imaging:   No valid procedures specified.     Measures:   Advanced Care Planning:   Patient does not have an advance directive, information provided.    Smoking Cessation:   Non-smoker.    Assessment / Plan      Assessment/Plan:   Diagnoses and all orders for this visit:    1. Type 2 diabetes mellitus  without complication, without long-term current use of insulin (Primary)   Patient does appear to be doing relatively well with respect to their diabetes.  They are tolerating their medications without complaints.  We will continue to follow their hemoglobin A1c and will make adjustments to keep their level less than 7%.    2. Mixed hyperlipidemia   Patient does appear to be tolerating their lipid-lowering agent without difficulty.  We will obtain the lipid profile as well as liver function test to observe for any abnormalities.  We will continue to adjust medications to keep their LDL less than 70.    3. Primary hypertension   Patient appears to be tolerating their blood pressure medicine without any side effects.  We will continue to monitor their blood pressure and will adjust to keep there is systolic blood pressure less than 130.  We will continue to monitor renal function and will make adjustments based on these findings.    4. Anxiety and depression  Patient appears to be doing well at present time with respect to their anxiety.  They are tolerating their medications and other treatment plans without difficulty.  We will continue with current regimen and if they have any other problems or complaints prior to her next scheduled follow-up they will contact us.  Adjustments of medications or referral to a behavioral health specialist may be necessary in the future.  Patient is still having some issues with respect to increasing anxiety due to stress.  We have discussed options and will keep him on the Lexapro but will add BuSpar to his regimen.  He understands the risk and benefits of the medications.  We will continue to monitor and will reassess when he returns to clinic on 9 September.  -     busPIRone (BUSPAR) 5 MG tablet; Take 1 tablet by mouth 3 (Three) Times a Day.  Dispense: 90 tablet; Refill: 3    5.  Left elbow mass   Patient does have a small firm mobile BB like mass of his left elbow.  We have  discussed that we will monitor his symptoms and hopefully will decrease in size and time.  If he does have any other issues or concerns before he follows up he will contact us.  We may decide surgical removal in the future.    Follow Up:   Return in about 26 days (around 9/9/2024).      At [facility], we believe that sharing information builds trust and better relationships. You are receiving this note because you recently visited [facility]. It is possible you will see health information before a provider has talked with you about it. This kind of information can be easy to misunderstand. To help you fully understand what it means for your health, we urge you to discuss this note with your provider.    Rigoberto Brand MD  [facility] [[email or web address]]

## 2024-09-06 ENCOUNTER — LAB (OUTPATIENT)
Dept: FAMILY MEDICINE CLINIC | Facility: CLINIC | Age: 44
End: 2024-09-06
Payer: COMMERCIAL

## 2024-09-06 DIAGNOSIS — Z00.00 WELL ADULT EXAM: ICD-10-CM

## 2024-09-06 LAB
ALBUMIN UR-MCNC: 1.8 MG/DL
BILIRUB UR QL STRIP: NEGATIVE
CLARITY UR: ABNORMAL
COLOR UR: ABNORMAL
DEPRECATED RDW RBC AUTO: 48.1 FL (ref 37–54)
ERYTHROCYTE [DISTWIDTH] IN BLOOD BY AUTOMATED COUNT: 14.6 % (ref 12.3–15.4)
GLUCOSE UR STRIP-MCNC: NEGATIVE MG/DL
HBA1C MFR BLD: 7.1 % (ref 4.8–5.6)
HCT VFR BLD AUTO: 44.6 % (ref 37.5–51)
HGB BLD-MCNC: 14.7 G/DL (ref 13–17.7)
HGB UR QL STRIP.AUTO: NEGATIVE
KETONES UR QL STRIP: NEGATIVE
LEUKOCYTE ESTERASE UR QL STRIP.AUTO: NEGATIVE
MCH RBC QN AUTO: 29.6 PG (ref 26.6–33)
MCHC RBC AUTO-ENTMCNC: 33 G/DL (ref 31.5–35.7)
MCV RBC AUTO: 89.9 FL (ref 79–97)
NITRITE UR QL STRIP: NEGATIVE
PH UR STRIP.AUTO: 5.5 [PH] (ref 5–8)
PLATELET # BLD AUTO: 152 10*3/MM3 (ref 140–450)
PMV BLD AUTO: 10.4 FL (ref 6–12)
PROT UR QL STRIP: ABNORMAL
RBC # BLD AUTO: 4.96 10*6/MM3 (ref 4.14–5.8)
SP GR UR STRIP: 1.03 (ref 1–1.03)
UROBILINOGEN UR QL STRIP: ABNORMAL
VIT B12 BLD-MCNC: 505 PG/ML (ref 211–946)
WBC NRBC COR # BLD AUTO: 6.27 10*3/MM3 (ref 3.4–10.8)

## 2024-09-06 PROCEDURE — 84478 ASSAY OF TRIGLYCERIDES: CPT | Performed by: FAMILY MEDICINE

## 2024-09-06 PROCEDURE — 82043 UR ALBUMIN QUANTITATIVE: CPT | Performed by: FAMILY MEDICINE

## 2024-09-06 PROCEDURE — 83883 ASSAY NEPHELOMETRY NOT SPEC: CPT | Performed by: FAMILY MEDICINE

## 2024-09-06 PROCEDURE — 83036 HEMOGLOBIN GLYCOSYLATED A1C: CPT | Performed by: FAMILY MEDICINE

## 2024-09-06 PROCEDURE — 82607 VITAMIN B-12: CPT | Performed by: FAMILY MEDICINE

## 2024-09-06 PROCEDURE — 83010 ASSAY OF HAPTOGLOBIN QUANT: CPT | Performed by: FAMILY MEDICINE

## 2024-09-06 PROCEDURE — 80050 GENERAL HEALTH PANEL: CPT | Performed by: FAMILY MEDICINE

## 2024-09-06 PROCEDURE — 82465 ASSAY BLD/SERUM CHOLESTEROL: CPT | Performed by: FAMILY MEDICINE

## 2024-09-06 PROCEDURE — 82947 ASSAY GLUCOSE BLOOD QUANT: CPT | Performed by: FAMILY MEDICINE

## 2024-09-06 PROCEDURE — 82247 BILIRUBIN TOTAL: CPT | Performed by: FAMILY MEDICINE

## 2024-09-06 PROCEDURE — 82172 ASSAY OF APOLIPOPROTEIN: CPT | Performed by: FAMILY MEDICINE

## 2024-09-06 PROCEDURE — 84450 TRANSFERASE (AST) (SGOT): CPT | Performed by: FAMILY MEDICINE

## 2024-09-06 PROCEDURE — 84460 ALANINE AMINO (ALT) (SGPT): CPT | Performed by: FAMILY MEDICINE

## 2024-09-06 PROCEDURE — 82977 ASSAY OF GGT: CPT | Performed by: FAMILY MEDICINE

## 2024-09-06 PROCEDURE — 80061 LIPID PANEL: CPT | Performed by: FAMILY MEDICINE

## 2024-09-06 PROCEDURE — 81003 URINALYSIS AUTO W/O SCOPE: CPT | Performed by: FAMILY MEDICINE

## 2024-09-07 LAB
ALBUMIN SERPL-MCNC: 4.5 G/DL (ref 3.5–5.2)
ALBUMIN/GLOB SERPL: 2 G/DL
ALP SERPL-CCNC: 77 U/L (ref 39–117)
ALT SERPL W P-5'-P-CCNC: 27 U/L (ref 1–41)
ANION GAP SERPL CALCULATED.3IONS-SCNC: 9 MMOL/L (ref 5–15)
AST SERPL-CCNC: 20 U/L (ref 1–40)
BILIRUB SERPL-MCNC: 0.5 MG/DL (ref 0–1.2)
BUN SERPL-MCNC: 14 MG/DL (ref 6–20)
BUN/CREAT SERPL: 21.5 (ref 7–25)
CALCIUM SPEC-SCNC: 9.3 MG/DL (ref 8.6–10.5)
CHLORIDE SERPL-SCNC: 103 MMOL/L (ref 98–107)
CHOLEST SERPL-MCNC: 93 MG/DL (ref 0–200)
CO2 SERPL-SCNC: 28 MMOL/L (ref 22–29)
CREAT SERPL-MCNC: 0.65 MG/DL (ref 0.76–1.27)
EGFRCR SERPLBLD CKD-EPI 2021: 120.7 ML/MIN/1.73
GLOBULIN UR ELPH-MCNC: 2.3 GM/DL
GLUCOSE SERPL-MCNC: 119 MG/DL (ref 65–99)
HDLC SERPL-MCNC: 26 MG/DL (ref 40–60)
LDLC SERPL CALC-MCNC: 48 MG/DL (ref 0–100)
LDLC/HDLC SERPL: 1.81 {RATIO}
POTASSIUM SERPL-SCNC: 4.2 MMOL/L (ref 3.5–5.2)
PROT SERPL-MCNC: 6.8 G/DL (ref 6–8.5)
SODIUM SERPL-SCNC: 140 MMOL/L (ref 136–145)
TRIGL SERPL-MCNC: 100 MG/DL (ref 0–150)
TSH SERPL DL<=0.05 MIU/L-ACNC: 2.24 UIU/ML (ref 0.27–4.2)
VLDLC SERPL-MCNC: 19 MG/DL (ref 5–40)

## 2024-09-09 ENCOUNTER — TELEPHONE (OUTPATIENT)
Dept: FAMILY MEDICINE CLINIC | Facility: CLINIC | Age: 44
End: 2024-09-09

## 2024-09-09 LAB
A2 MACROGLOB SERPL-MCNC: 247 MG/DL (ref 110–276)
ALT SERPL W P-5'-P-CCNC: 32 IU/L (ref 0–55)
APO A-I SERPL-MCNC: 96 MG/DL (ref 101–178)
AST SERPL W P-5'-P-CCNC: 29 IU/L (ref 0–40)
BILIRUB SERPL-MCNC: 0.3 MG/DL (ref 0–1.2)
CHOLEST SERPL-MCNC: 94 MG/DL (ref 100–199)
FIBROSIS SCORING:: ABNORMAL
FIBROSIS STAGE SERPL QL: ABNORMAL
GGT SERPL-CCNC: 19 IU/L (ref 0–65)
GLUCOSE SERPL-MCNC: 128 MG/DL (ref 70–99)
HAPTOGLOB SERPL-MCNC: 138 MG/DL (ref 23–355)
LABORATORY COMMENT REPORT: ABNORMAL
LIVER FIBR SCORE SERPL CALC.FIBROSURE: 0.25 (ref 0–0.21)
LIVER STEATOSIS GRADE SERPL QL: ABNORMAL
LIVER STEATOSIS SCORE SERPL: 0.41 (ref 0–0.4)
NASH GRADE SERPL QL: ABNORMAL
NASH INTERPRETATION SERPL-IMP: ABNORMAL
NASH SCORE SERPL: 0.49 (ref 0–0.25)
NASH SCORING: ABNORMAL
STEATOSIS SCORING: ABNORMAL
TEST PERFORMANCE INFO SPEC: ABNORMAL
TEST PERFORMANCE INFO SPEC: ABNORMAL
TRIGL SERPL-MCNC: 131 MG/DL (ref 0–149)

## 2024-09-09 NOTE — PROGRESS NOTES
Contacted patient to speak about results. No anwser and voicemail left. Telephone encounter created.

## 2024-09-09 NOTE — TELEPHONE ENCOUNTER
----- Message from Rigoberto Brand sent at 9/7/2024  8:03 AM EDT -----  Patient's urinalysis does continue to reveal protein.  Most likely secondary to his diabetes.  He needs to make certain that he is taking his benazepril to reduce the risk of diabetic nephropathy.  Kidney function and liver function test are appropriate.  Lipid profile is acceptable.  Patient's hemoglobin A1c has worsened to 7.1%.  He needs to do better with his low carbohydrate/low-fat diet as well as 300 minutes of aerobic exercise weekly.  We may need to further modify if he cannot show improvement in his diabetes.  Thyroid function test is appropriate as well as B12.  Patient is not anemic.  We are still awaiting the [facility] FibroSure test.

## 2024-09-10 ENCOUNTER — PATIENT ROUNDING (BHMG ONLY) (OUTPATIENT)
Dept: FAMILY MEDICINE CLINIC | Facility: CLINIC | Age: 44
End: 2024-09-10
Payer: COMMERCIAL

## 2024-09-10 NOTE — TELEPHONE ENCOUNTER
----- Message from Rigoberto Brand sent at 9/10/2024  6:02 AM EDT -----  Patient's FibroSure test reveals that he does have mild fatty liver with possible mild liver involvement.  He may have early stages of fibrosis but the blood test appears to be over estimating the severity of the disease.  Nonetheless, he needs to continue with his low carbohydrate/low-fat diet as well as 300 minutes of aerobic exercise weekly.  He needs to continue to focus on weight loss as this will greatly help with his liver as well as his his overall general health.  We will discuss further during his next scheduled follow-up.  He missed 1 yesterday, he needs to schedule a follow-up.

## 2024-09-10 NOTE — PROGRESS NOTES
September 10, 2024    Hello, may I speak with Dom Russell?    My name is Ben    I am  with [facility]  [address] [place] [address] [phone].    Before we get started may I verify your date of birth? 1980    I am calling to officially welcome you to our practice and ask about your recent visit. Is this a good time to talk?     Tell me about your visit with us. What things went well?         We're always looking for ways to make our patients' experiences even better. Do you have recommendations on ways we may improve?      Overall were you satisfied with your first visit to our practice?        I appreciate you taking the time to speak with me today. Is there anything else I can do for you?       Thank you, and have a great day.    LVM- Message left for patient, following up on current office visit, requesting to have patient contact pcp office.

## 2024-10-03 ENCOUNTER — OFFICE VISIT (OUTPATIENT)
Dept: FAMILY MEDICINE CLINIC | Facility: CLINIC | Age: 44
End: 2024-10-03
Payer: COMMERCIAL

## 2024-10-03 VITALS
BODY MASS INDEX: 37.19 KG/M2 | DIASTOLIC BLOOD PRESSURE: 80 MMHG | SYSTOLIC BLOOD PRESSURE: 122 MMHG | OXYGEN SATURATION: 97 % | WEIGHT: 315 LBS | RESPIRATION RATE: 18 BRPM | TEMPERATURE: 97.8 F | HEART RATE: 75 BPM | HEIGHT: 77 IN

## 2024-10-03 DIAGNOSIS — E66.01 MORBID (SEVERE) OBESITY DUE TO EXCESS CALORIES: ICD-10-CM

## 2024-10-03 DIAGNOSIS — F51.01 PRIMARY INSOMNIA: ICD-10-CM

## 2024-10-03 DIAGNOSIS — F32.A ANXIETY AND DEPRESSION: ICD-10-CM

## 2024-10-03 DIAGNOSIS — I10 PRIMARY HYPERTENSION: ICD-10-CM

## 2024-10-03 DIAGNOSIS — K75.81 METABOLIC DYSFUNCTION-ASSOCIATED STEATOHEPATITIS (MASH): ICD-10-CM

## 2024-10-03 DIAGNOSIS — E11.9 TYPE 2 DIABETES MELLITUS WITHOUT COMPLICATION, WITHOUT LONG-TERM CURRENT USE OF INSULIN: Primary | ICD-10-CM

## 2024-10-03 DIAGNOSIS — E78.2 MIXED HYPERLIPIDEMIA: ICD-10-CM

## 2024-10-03 DIAGNOSIS — F41.9 ANXIETY AND DEPRESSION: ICD-10-CM

## 2024-10-03 DIAGNOSIS — K21.9 CHRONIC GERD: ICD-10-CM

## 2024-10-03 PROCEDURE — 99214 OFFICE O/P EST MOD 30 MIN: CPT | Performed by: FAMILY MEDICINE

## 2024-10-03 RX ORDER — SEMAGLUTIDE 2.68 MG/ML
2 INJECTION, SOLUTION SUBCUTANEOUS WEEKLY
Qty: 3 ML | Refills: 11 | Status: SHIPPED | OUTPATIENT
Start: 2024-10-03

## 2024-10-03 NOTE — PROGRESS NOTES
Follow Up Office Visit      Date: 10/03/2024   Patient Name: Dom Russell  : 1980   MRN: [id number]     Chief Complaint:    Chief Complaint   Patient presents with    Follow-up       History of Present Illness: Dom Russell is a 44 y.o. male who is here today for follow-up.  Patient has been doing relatively well with good tolerance of his medications.  Patient believes that he he is not having any issues with his underlying regiment.  Unfortunately he has not lost much weight with the Trulicity even though he is taking the maximum dose.  His recent hemoglobin A1c did appear to be appropriate.  Other laboratory data was acceptable.  Patient has no other concerns currently except for some difficulty sleeping at night.  Patient states that he has more problems falling asleep versus staying asleep.  Patient appears to be doing otherwise well.  They have continue with their medications without any side effects.  They have not had any changes in their usual activity and appetite.  Sleep remains a problem..  Patient denies any other cardiovascular, respiratory, gastrointestinal, urologic or neurologic complaints.    Subjective      Review of Systems:   Review of Systems   Constitutional:  Negative for activity change, appetite change and fatigue.   Respiratory:  Negative for cough, chest tightness, shortness of breath and wheezing.    Cardiovascular:  Negative for chest pain, palpitations and leg swelling.   Gastrointestinal:  Negative for abdominal distention, abdominal pain, blood in stool, constipation, diarrhea, nausea, vomiting, GERD and indigestion.   Genitourinary:  Negative for difficulty urinating, dysuria, flank pain, frequency, hematuria and urgency.   Musculoskeletal:  Negative for arthralgias, back pain, gait problem, joint swelling and myalgias.   Neurological:  Negative for dizziness, tremors, seizures, syncope, weakness, light-headedness, numbness, headache and memory problem.    Psychiatric/Behavioral:  Positive for sleep disturbance. Negative for depressed mood. The patient is not nervous/anxious.        I have reviewed the patients family history, social history, past medical history, past surgical history and have updated it as appropriate.     Medications:     Current Outpatient Medications:     amLODIPine (NORVASC) 10 MG tablet, Take 1 tablet by mouth Daily., Disp: 90 tablet, Rfl: 3    benazepril (LOTENSIN) 20 MG tablet, Take 1 tablet by mouth daily, Disp: 90 tablet, Rfl: 3    busPIRone (BUSPAR) 5 MG tablet, Take 1 tablet by mouth 3 (Three) Times a Day., Disp: 90 tablet, Rfl: 3    cetirizine (ZyrTEC) 10 MG chewable tablet, 1 tablet., Disp: , Rfl:     Continuous Glucose Sensor (FreeStyle Wen 3 Sensor) misc, Use 1 each Every 14 (Fourteen) Days., Disp: 2 each, Rfl: 11    escitalopram (Lexapro) 20 MG tablet, Take 2 tablets by mouth Daily., Disp: 180 tablet, Rfl: 3    fenofibrate micronized (LOFIBRA) 134 MG capsule, 1 capsule., Disp: , Rfl:     metoprolol succinate XL (TOPROL-XL) 50 MG 24 hr tablet, Take 1 tablet by mouth Daily., Disp: 90 tablet, Rfl: 0    omeprazole (priLOSEC) 40 MG capsule, Take 1 capsule by mouth Daily., Disp: 90 capsule, Rfl: 0    rosuvastatin (CRESTOR) 20 MG tablet, Take 1 tablet by mouth Daily., Disp: 90 tablet, Rfl: 0    Semaglutide, 2 MG/DOSE, (Ozempic, 2 MG/DOSE,) 8 MG/3ML solution pen-injector, Inject 2 mg under the skin into the appropriate area as directed 1 (One) Time Per Week., Disp: 3 mL, Rfl: 11    Allergies:   Allergies   Allergen Reactions    Amoxicillin Hives    Ceftazidime Hives    Cephalosporins Hives and Shortness Of Breath    Clarithromycin Hives and Anaphylaxis    Codeine Hives    Penicillins Hives and Seizure    Sulbactam Hives    Unasyn [Ampicillin-Sulbactam Sodium] Hives       Immunizations:   Immunization History   Administered Date(s) Administered    Flu Vaccine Quad PF >36MO 10/18/2021, 10/03/2022    Fluzone (or Fluarix & Flulaval for VFC)  ">6mos 10/18/2021, 10/03/2022    Fluzone High-Dose 65+YRS 10/30/2017    Fluzone Quad >6mos (Multi-dose) 11/30/2018, 10/01/2019    Hepatitis A 11/01/2018, 11/30/2018, 06/21/2019    Hepatitis B Adult/Adolescent IM 02/14/2006, 06/27/2006, 08/29/2006, 08/31/2020, 11/09/2020    Influenza, Unspecified 12/28/2023    Pneumococcal Conjugate 20-Valent (PCV20) 03/07/2023    Pneumococcal Polysaccharide (PPSV23) 12/20/2011, 06/06/2019    Td (TDVAX) 08/14/1997    Tdap 12/07/2022        Objective     Physical Exam: Please see above  Vital Signs:   Vitals:    10/03/24 0844   BP: 122/80   BP Location: Left arm   Patient Position: Sitting   Cuff Size: Large Adult   Pulse: 75   Resp: 18   Temp: 97.8 °F (36.6 °C)   TempSrc: Temporal   SpO2: 97%   Weight: (!) 160 kg (353 lb)   Height: 195.6 cm (77.01\")   PainSc: 0-No pain     Body mass index is 41.85 kg/m².          Physical Exam  Vitals and nursing note reviewed.   Constitutional:       Appearance: Normal appearance.   HENT:      Head: Normocephalic and atraumatic.      Nose: Nose normal.      Mouth/Throat:      Pharynx: Oropharynx is clear.   Eyes:      Extraocular Movements: Extraocular movements intact.      Pupils: Pupils are equal, round, and reactive to light.   Neck:      Thyroid: No thyroid mass or thyromegaly.      Trachea: Trachea normal.   Cardiovascular:      Rate and Rhythm: Normal rate and regular rhythm.      Pulses: Normal pulses. No decreased pulses.      Heart sounds: Normal heart sounds.   Pulmonary:      Effort: Pulmonary effort is normal.      Breath sounds: Normal breath sounds.   Abdominal:      General: Abdomen is flat. Bowel sounds are normal.      Palpations: Abdomen is soft.      Tenderness: There is no abdominal tenderness.   Musculoskeletal:      Cervical back: Neck supple.      Right lower leg: No edema.      Left lower leg: No edema.   Lymphadenopathy:      Cervical: No cervical adenopathy.   Skin:     General: Skin is warm and dry.   Neurological:      " General: No focal deficit present.      Mental Status: He is alert and oriented to person, place, and time.      Sensory: Sensation is intact.      Motor: Motor function is intact.      Coordination: Coordination is intact.   Psychiatric:         Attention and Perception: Attention normal.         Mood and Affect: Mood normal.         Speech: Speech normal.         Behavior: Behavior normal.         Procedures    Results:   Labs:   Hemoglobin A1C   Date Value Ref Range Status   09/06/2024 7.10 (H) 4.80 - 5.60 % Final     TSH   Date Value Ref Range Status   09/06/2024 2.240 0.270 - 4.200 uIU/mL Final        POCT Results (if applicable):   Results for orders placed or performed in visit on 09/06/24   CBC (No Diff)    Specimen: Arm, Right; Blood   Result Value Ref Range    WBC 6.27 3.40 - 10.80 10*3/mm3    RBC 4.96 4.14 - 5.80 10*6/mm3    Hemoglobin 14.7 13.0 - 17.7 g/dL    Hematocrit 44.6 37.5 - 51.0 %    MCV 89.9 79.0 - 97.0 fL    MCH 29.6 26.6 - 33.0 pg    MCHC 33.0 31.5 - 35.7 g/dL    RDW 14.6 12.3 - 15.4 %    RDW-SD 48.1 37.0 - 54.0 fl    MPV 10.4 6.0 - 12.0 fL    Platelets 152 140 - 450 10*3/mm3   Comprehensive Metabolic Panel    Specimen: Arm, Right; Blood   Result Value Ref Range    Glucose 119 (H) 65 - 99 mg/dL    BUN 14 6 - 20 mg/dL    Creatinine 0.65 (L) 0.76 - 1.27 mg/dL    Sodium 140 136 - 145 mmol/L    Potassium 4.2 3.5 - 5.2 mmol/L    Chloride 103 98 - 107 mmol/L    CO2 28.0 22.0 - 29.0 mmol/L    Calcium 9.3 8.6 - 10.5 mg/dL    Total Protein 6.8 6.0 - 8.5 g/dL    Albumin 4.5 3.5 - 5.2 g/dL    ALT (SGPT) 27 1 - 41 U/L    AST (SGOT) 20 1 - 40 U/L    Alkaline Phosphatase 77 39 - 117 U/L    Total Bilirubin 0.5 0.0 - 1.2 mg/dL    Globulin 2.3 gm/dL    A/G Ratio 2.0 g/dL    BUN/Creatinine Ratio 21.5 7.0 - 25.0    Anion Gap 9.0 5.0 - 15.0 mmol/L    eGFR 120.7 >60.0 mL/min/1.73   Hemoglobin A1c    Specimen: Arm, Right; Blood   Result Value Ref Range    Hemoglobin A1C 7.10 (H) 4.80 - 5.60 %   Lipid Panel     Specimen: Arm, Right; Blood   Result Value Ref Range    Total Cholesterol 93 0 - 200 mg/dL    Triglycerides 100 0 - 150 mg/dL    HDL Cholesterol 26 (L) 40 - 60 mg/dL    LDL Cholesterol  48 0 - 100 mg/dL    VLDL Cholesterol 19 5 - 40 mg/dL    LDL/HDL Ratio 1.81    Vitamin B12    Specimen: Arm, Right; Blood   Result Value Ref Range    Vitamin B-12 505 211 - 946 pg/mL   TSH Rfx On Abnormal To Free T4    Specimen: Arm, Right; Blood   Result Value Ref Range    TSH 2.240 0.270 - 4.200 uIU/mL   MA Fibrosure    Specimen: Arm, Right; Blood   Result Value Ref Range    Fibrosis Score 0.25 (H) 0.00 - 0.21    Fibrosis Stage F0-F1     Steatosis Score (Reference) 0.41 (H) 0.00 - 0.40    Steatosis Grade (Reference) Comment     MA Score (Reference) 0.49 (H) 0.00 - 0.25    Ma Grade (Reference) Comment     Methodology: Comment     Alpha 2-Macroglobulins, Qn 247 110 - 276 mg/dL    Haptoglobin 138 23 - 355 mg/dL    Apolipoprotein A-1 96 (L) 101 - 178 mg/dL    Total Bilirubin 0.3 0.0 - 1.2 mg/dL    GGT 19 0 - 65 IU/L    ALT (SGPT) 32 0 - 55 IU/L    AST (SGOT) P5P (Reference) 29 0 - 40 IU/L    Cholesterol, Total (Reference) 94 (L) 100 - 199 mg/dL    Glucose, Serum (Reference) 128 (H) 70 - 99 mg/dL    Triglycerides 131 0 - 149 mg/dL    Interpretations: (Reference) Comment     Fibrosis Scoring: Comment     Steatosis Scoring Comment     MA Scoring Comment     Limitations: Comment     Comment Comment    Urinalysis without microscopic (no culture) - Urine, Clean Catch    Specimen: Urine, Clean Catch   Result Value Ref Range    Color, UA Dark Yellow (A) Yellow, Straw    Appearance, UA Cloudy (A) Clear    pH, UA 5.5 5.0 - 8.0    Specific Gravity, UA 1.027 1.005 - 1.030    Glucose, UA Negative Negative    Ketones, UA Negative Negative    Bilirubin, UA Negative Negative    Blood, UA Negative Negative    Protein, UA Trace (A) Negative    Leuk Esterase, UA Negative Negative    Nitrite, UA Negative Negative    Urobilinogen, UA 1.0 E.U./dL 0.2  - 1.0 E.U./dL   MicroAlbumin, Urine, Random - Urine, Clean Catch    Specimen: Urine, Clean Catch   Result Value Ref Range    Microalbumin, Urine 1.8 mg/dL       Imaging:   No valid procedures specified.     Measures:   Advanced Care Planning:   Patient does not have an advance directive, information provided.    Smoking Cessation:   Non-smoker.    Assessment / Plan      Assessment/Plan:   Diagnoses and all orders for this visit:    1. Type 2 diabetes mellitus without complication, without long-term current use of insulin (Primary)  Patient does appear to be doing relatively well with respect to their diabetes.  They are tolerating their medications without complaints.  We will continue to follow their hemoglobin A1c and will make adjustments to keep their level less than 7%.  We have discussed options and since he has not lost any weight with the Trulicity we will switch him over to Ozempic.  He understands he may need further modification.  We will encourage increase in activity to 300 minutes of aerobic exercise weekly.  -     Semaglutide, 2 MG/DOSE, (Ozempic, 2 MG/DOSE,) 8 MG/3ML solution pen-injector; Inject 2 mg under the skin into the appropriate area as directed 1 (One) Time Per Week.  Dispense: 3 mL; Refill: 11    2. Primary hypertension   Patient appears to be tolerating their blood pressure medicine without any side effects.  We will continue to monitor their blood pressure and will adjust to keep there is systolic blood pressure less than 130.  We will continue to monitor renal function and will make adjustments based on these findings.    3. Mixed hyperlipidemia   Patient does appear to be tolerating their lipid-lowering agent without difficulty.  We will obtain the lipid profile as well as liver function test to observe for any abnormalities.  We will continue to adjust medications to keep their LDL less than 70.    4. Metabolic dysfunction-associated steatohepatitis (MASH)   Patient has not lost any  weight recently.  We will change the GLP-1 agonists.  He will continue to watch his carbohydrate and fat intake as well as try to increase his activity level to 300 minutes of aerobic exercise weekly.  We will continue to monitor laboratory data and will obtain the [employer] test on a yearly basis.    5. Anxiety and depression   Patient appears to be doing well at present time with respect to their anxiety.  They are tolerating their medications and other treatment plans without difficulty.  We will continue with current regimen and if they have any other problems or complaints prior to her next scheduled follow-up they will contact us.  Adjustments of medications or referral to a behavioral health specialist may be necessary in the future.    6. Morbid (severe) obesity due to excess calories   As mention we will switch to GLP-1 agonist and will monitor.  7. Chronic GERD   Patient is doing well at present time with respect to the reflux symptomatology.  They are tolerating their medications without any complaints at present time.  We will continue to monitor their symptoms and if they develop dysphagia, alarm symptoms or worsening symptomatology further evaluation and treatment may be necessary as well as possible referral for an EGD.    8. Primary insomnia   Patient does take melatonin at night but does so when he goes to bed.  He is having difficulty falling asleep.  We have encouraged him to pursue taking the melatonin in 2 to 3 hours before he goes to bed.  We will continue to monitor.  We have also encouraged good sleep hygiene and that exercise may benefit his sleep.      Follow Up:   Return in about 2 months (around 12/6/2024).      At [facility], we believe that sharing information builds trust and better relationships. You are receiving this note because you recently visited [facility]. It is possible you will see health information before a provider has talked with you about it. This kind of  information can be easy to misunderstand. To help you fully understand what it means for your health, we urge you to discuss this note with your provider.    Rigoberto Brand MD  [facility]

## 2024-10-09 ENCOUNTER — OFFICE VISIT (OUTPATIENT)
Age: 44
End: 2024-10-09
Payer: COMMERCIAL

## 2024-10-09 VITALS
HEIGHT: 77 IN | SYSTOLIC BLOOD PRESSURE: 123 MMHG | WEIGHT: 315 LBS | DIASTOLIC BLOOD PRESSURE: 61 MMHG | OXYGEN SATURATION: 98 % | HEART RATE: 85 BPM | BODY MASS INDEX: 37.19 KG/M2

## 2024-10-09 DIAGNOSIS — F33.0 MILD EPISODE OF RECURRENT MAJOR DEPRESSIVE DISORDER: Primary | ICD-10-CM

## 2024-10-09 DIAGNOSIS — F43.9 TRAUMA AND STRESSOR-RELATED DISORDER: ICD-10-CM

## 2024-10-09 RX ORDER — DEXTROMETHORPHAN HYDROBROMIDE, BUPROPION HYDROCHLORIDE 105; 45 MG/1; MG/1
1 TABLET, MULTILAYER, EXTENDED RELEASE ORAL EVERY MORNING
Qty: 30 TABLET | Refills: 0 | COMMUNITY
Start: 2024-10-09 | End: 2024-11-08

## 2024-10-09 RX ORDER — DEXTROMETHORPHAN HYDROBROMIDE, BUPROPION HYDROCHLORIDE 105; 45 MG/1; MG/1
1 TABLET, MULTILAYER, EXTENDED RELEASE ORAL EVERY MORNING
Qty: 30 TABLET | Refills: 2 | Status: SHIPPED | OUTPATIENT
Start: 2024-10-09

## 2024-10-09 NOTE — PATIENT INSTRUCTIONS
[email or web address]: online therapist directory    Dre recommendations:  [employer]: free library access, including audiobooks and e-books  I Am: affirmations  Balance: mindfulness and meditation  [employer]: mental health dre for kids and adults    Bilateral stimulation music: free on youtube and spotify    Book Recommendations:  How To Do The Work, How to Be The Love You Seek, Ophelia Christine (follow the Holistic Psychologist)*    The Body Keeps The Score, William Castellano  The Myth of Normal, Eric Mate    No Bad Parts, Arpan Ji (IFS)*    Try Softer, Aunvida Scarletaury*    She Deserves Better, Fely Barrera  Parenting a Child Who Has Intense Emotions, Sharmila

## 2024-10-09 NOTE — PROGRESS NOTES
"    New Patient Office Visit      Date: 10/09/2024  Patient Name: Dom Russell  : 1980   MRN: [id number]     Referring Provider: Rigoberto Brand MD    Chief Complaint:      ICD-10-CM ICD-9-CM   1. Mild episode of recurrent major depressive disorder  F33.0 296.31   2. Trauma and stressor-related disorder  F43.9 309.81     308.9        History of Present Illness:   Dom Russell is a 44 y.o. male who is here today to establish care. He states, \"Dr Brand sent me. 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  He has always been able to cope with the stress of his job before, until the loss of a child in ; he states, \"That just broke me.\"  He has tried psychiatric medications, which did not seem to be effective; he started seeing a therapist, but has not been back in over 9 months.  Current symptoms include difficulty remembering details on a short-term basis, struggling to find words as he communicates, feelings of sadness/hopelessness, irritability (especially while driving), and difficulty regulating his emotions.  He states, \"Used to, I could control my emotions better.  Now it is a lot harder.\"  Since the loss of his child, he has been especially troubled by emergency calls that involve children; adult cases are troublesome too, and he feels haunted by memories of people suffering.  He also has difficulty sleeping, and takes melatonin nightly.  He states, \"I did the biggest portion of my thinking as my head hits the pillow.  My mind just races, and I worry a lot.\"  He has a difficult time being in crowds of people, is often hypervigilant, and cannot go into the store or shopping with his children without severe anxiety.  He has a hard time reaching out to other people for support, but understands that it can be helpful.  No SI/HI/psychotic/manic symptoms present, no adverse " effects or side effects to current medications noted, and no issues with appetite reported.     Subjective      Review of Systems:   Review of Systems   Psychiatric/Behavioral:  Positive for decreased concentration, sleep disturbance, depressed mood and stress. The patient is nervous/anxious.        Screening Scores:   PHQ-9 : 11  WON-7 : 13  PTSD: 56  MDQ: 3  ASRS-V1.1: negative    Past Psychiatric History:  History of outpatient psychiatrist: meds managed by primary care  History of outpatient therapy: history of, none current; last visit 8 months ago with Jovan Cook  Previous Inpatient hospitalizations: no  Previous diagnoses: anxiety, depression, PTSD  Previous medication trials: lexapro, zoloft, buspar  History of suicide attempts: no, but history of passive suicidal ideation  History of self harming behaviors: no     Abuse/trauma History:              Physical: no              Sexual: no              Emotional/Neglect: no              Death/loss of relationship: foster kids who they were going to adopt; one  (2022) and one was sent back to his parents unexpectedly  Other trauma: His wife got endometrial cancer right after they got , hysterectomy; fostered kids ever since. Also a , exposed to trauma on  regular basis; marital strife (wife cheated)                Substance Abuse History:              Alcohol: occasional when away with friends; none since having kids              Tobacco/Vape: quit smoking, vapes daily              Illicit Drugs: no  Marijuana/THC: smoked pot one time, took gummies once  Hallucinogens: no                Legal History:  The patient has no significant history of legal issues.     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,  in [employer]  Leisure and recreation: fishing and camping; got a new camper a few months ago  Support system: \"I got plenty of support, I just gotta talk to them;\" has a close work friend  B[employer]ist practices: Mormonism, haven't gone much since son      Family History:  Family History   Problem Relation Age of Onset    Hypertension Mother     Hyperlipidemia Mother     Diabetes Mother     Kidney disease Mother     Liver disease Mother         Cirrhosis of the liver    Hypertension Father     Arthritis Father     Cancer Father         Skin cancer    Hyperlipidemia Father     Bradycardia Sister     Breast cancer Other     Diabetes Other     Hypertension Other     Lung cancer Other     Heart attack Other     Skin cancer Other     Cancer Maternal Grandfather         Lung Cancer    Cancer Paternal Grandfather         Skin Cancer    Heart disease Paternal Grandfather         2 heart attacks 5 bypasses, congestive heart failure    Hyperlipidemia Paternal Grandfather     Cancer Maternal Aunt         Breast Cancer       Family Psychiatric History:   Psych diagnoses: grandma had altzheimers, sister may have something  Suicide/self harm attempts: no  Substance abuse: no    Patient Medical History:  Are there any significant health issues (current or past): diabetes, hypertension  History of seizures: no   History of head injuries: no  History of cardiac issues: HTN, bradycardia  Herbal medications / dietary supplements: no    Past Medical History:   Diagnosis Date    Acute bronchitis     Adult-onset obesity     Agitated     Allergic rhinitis     Anxiety and depression     Arthritis     Asthma     Brain concussion 2003    Cellulitis     Chronic GERD     Diabetes mellitus     Diarrhea     Dyspnea     Exposure to influenza     GERD (gastroesophageal reflux disease)     Gonadal dysgenesis     Headache     Hemorrhoid "     Hyperlipidemia     Hypersomnia with sleep apnea     Hypertension, benign     Kidney stone 2023    [facility]    Leg skin lesion, right     Low back pain 2015    Lumbosacral disc disease 2021    Mixed dyslipidemia     Morbid obesity     MRSA (methicillin resistant Staphylococcus aureus) 2021    Pain in joint involving shoulder region     Pain in left lower leg     Pain of left shoulder region     Panniculitis     Peripheral neuropathy 2019    Pneumococcal vaccination declined     Pneumonia     Primary hypertension     Scabies     Sinusitis, acute     Spindle cell type atypical fibroxanthoma     Sprain of ankle, sequela        Past Surgical History:   Past Surgical History:   Procedure Laterality Date    EAR TUBES      GALLBLADDER SURGERY  05/17/2024    GASTRIC SLEEVE LAPAROSCOPIC      HIP SURGERY Right     HIP SLIPPED EPHISLEA    SINUS SURGERY      TONSILLECTOMY         Medications:     Current Outpatient Medications:     amLODIPine (NORVASC) 10 MG tablet, Take 1 tablet by mouth Daily., Disp: 90 tablet, Rfl: 3    benazepril (LOTENSIN) 20 MG tablet, Take 1 tablet by mouth daily, Disp: 90 tablet, Rfl: 3    busPIRone (BUSPAR) 5 MG tablet, Take 1 tablet by mouth 3 (Three) Times a Day., Disp: 90 tablet, Rfl: 3    cetirizine (ZyrTEC) 10 MG chewable tablet, 1 tablet., Disp: , Rfl:     escitalopram (Lexapro) 20 MG tablet, Take 2 tablets by mouth Daily., Disp: 180 tablet, Rfl: 3    metoprolol succinate XL (TOPROL-XL) 50 MG 24 hr tablet, Take 1 tablet by mouth Daily., Disp: 90 tablet, Rfl: 0    omeprazole (priLOSEC) 40 MG capsule, Take 1 capsule by mouth Daily., Disp: 90 capsule, Rfl: 0    rosuvastatin (CRESTOR) 20 MG tablet, Take 1 tablet by mouth Daily., Disp: 90 tablet, Rfl: 0    Semaglutide, 2 MG/DOSE, (Ozempic, 2 MG/DOSE,) 8 MG/3ML solution pen-injector, Inject 2 mg under the skin into the appropriate area as directed 1 (One) Time Per Week., Disp: 3 mL, Rfl: 11    Continuous Glucose Sensor (FreeStyle Wen 3 Sensor)  "misc, Use 1 each Every 14 (Fourteen) Days., Disp: 2 each, Rfl: 11    Dextromethorphan-buPROPion ER (Auvelity)  MG tablet controlled-release, Take 1 tablet by mouth Every Morning., Disp: 30 tablet, Rfl: 2    Dextromethorphan-buPROPion ER (Auvelity)  MG tablet controlled-release, Take 1 tablet by mouth Every Morning for 30 days., Disp: 30 tablet, Rfl: 0    fenofibrate micronized (LOFIBRA) 134 MG capsule, 1 capsule., Disp: , Rfl:     Medication Considerations:  ROBSON reviewed and appropriate.      Allergies:   Allergies   Allergen Reactions    Amoxicillin Hives    Ceftazidime Hives    Cephalosporins Hives and Shortness Of Breath    Clarithromycin Hives and Anaphylaxis    Codeine Hives    Penicillins Hives and Seizure    Sulbactam Hives    Unasyn [Ampicillin-Sulbactam Sodium] Hives       Objective   Vital Signs:   Vitals:    10/09/24 1544   BP: 123/61   Pulse: 85   SpO2: 98%   Weight: (!) 159 kg (350 lb 12.8 oz)   Height: 195.6 cm (77.01\")     Body mass index is 41.59 kg/m².     Mental Status Exam:   MENTAL STATUS EXAM   General Appearance:  Cleanly groomed and dressed  Eye Contact:  Good eye contact  Attitude:  Cooperative  Motor Activity:  Normal gait, posture  Muscle Strength:  Normal  Speech:  Normal rate, tone, volume  Language:  Spontaneous  Mood and affect:  Anxious and depressed  Hopelessness:  3  Loneliness: 3  Thought Process:  Logical  Associations/ Thought Content:  No delusions  Hallucinations:  None  Suicidal Ideations:  Not present  Homicidal Ideation:  Not present  Sensorium:  Alert and clear  Orientation:  Person, place, time and situation  Immediate Recall, Recent, and Remote Memory:  Deficit noted  Attention Span/ Concentration:  Easily distracted  Fund of Knowledge:  Appropriate for age and educational level  Intellectual Functioning:  Average range  Insight:  Good  Judgement:  Good  Reliability:  Good  Impulse Control:  Fair       SUICIDE RISK ASSESSMENT/CSSRS:  1. Does patient have " thoughts of suicide? no  2. Does patient have intent for suicide? no  3. Does patient have a current plan for suicide? no  4. History of suicide attempts: no  5. Family history of suicide or attempts: no  6. History of violent behaviors towards others or property or thoughts of committing suicide: no  7. History of sexual aggression toward others: no  8. Access to firearms or weapons: no    Labs Reviewed: n/a  UDS Reviewed: n/a  Chart Reviewed: yes    Assessment / Plan      Quality Measures:   Tobacco cessation: Patient is a former tobacco user. No tobacco cessation education necessary.      Depression (PHQ >9): Patient screened positive for depression with a PHQ score of 11. Follow up recommendations include medication management, suicide risk assessment, continued screening score monitoring and supportive care.    Medication Considerations:  Benzo: n/a  Stimulants: n/a   ROBSON reviewed and appropriate.     Safety: No acute safety concerns    Risk Assessment: Risk of self-harm acutely is low. Risk of self-harm chronically is also low, but could be further elevated in the event of treatment noncompliance and/or AODA.    Visit Diagnosis/Orders Placed This Visit:  Diagnoses and all orders for this visit:    1. Mild episode of recurrent major depressive disorder (Primary)  -     Dextromethorphan-buPROPion ER (Auvelity)  MG tablet controlled-release; Take 1 tablet by mouth Every Morning.  Dispense: 30 tablet; Refill: 2  -     Dextromethorphan-buPROPion ER (Auvelity)  MG tablet controlled-release; Take 1 tablet by mouth Every Morning for 30 days.  Dispense: 30 tablet; Refill: 0    2. Trauma and stressor-related disorder         Impression/Formulation:  Patient appeared alert and oriented.  Patient is voluntarily seeking [facility].  Patient is receptive to assistance with maintaining a stable lifestyle.  Patient presents with history of     ICD-10-CM ICD-9-CM   1.  Mild episode of recurrent major depressive disorder  F33.0 296.31   2. Trauma and stressor-related disorder  F43.9 309.81     308.9     The patient shows some signs of ADHD, his ASRS is negative, and his symptoms are also consistent with depression and PTSD.  Because of previous failed trials at SSRIs, patient may benefit from Wellbutrin (or Wellbutrin-containing medication) if indeed there is a ADHD component to his depression.  Other options include Pristiq, which could also be helpful for chronic pain, and beneficial for ADHD, tangentially.  We will reevaluate in the future.    Treatment Plan:   For now, continue BuSpar 5 mg up to 3 times daily, and continue Lexapro 40 mg daily.  Start Auvelity  mg daily.  (Sample given.) Discussed book recommendations, establishing care with a local therapist, and nonpharmacologic interventions for anxiety. Follow up in 4 weeks.    Any medications prescribed have been sent electronically to [facility] in [place].     Patient will continue supportive psychotherapy efforts and medications as indicated. Discussed medication options and treatment plan of prescribed medication(s) as well as the risks, benefits, and potential side effects. Patient ackowledged and verbally consented to continue with current treatment plan and was educated on the importance of compliance with treatment and follow-up appointments. Patient seems reasonably able to adhere to treatment plan.      Assisted Patient in identifying risk factors which would indicate the need for higher level of care including thoughts to harm self or others and/or self-harming behavior and encouraged Patient to contact this office, call 911, or present to the nearest emergency room should any of these events occur. Discussed crisis intervention services and means to access. Clinic will obtain release of information for current treatment team for continuity of care as needed. Patient adamantly and  convincingly denies current suicidal or homicidal ideation or perceptual disturbance.     Follow Up:   Return in about 4 weeks (around 11/6/2024) for Medication Management.        MELL Mckeon  [facility]    This is electronically signed by MELL Mckeon  10/09/2024 16:00 EDT

## 2024-10-10 ENCOUNTER — PRIOR AUTHORIZATION (OUTPATIENT)
Dept: BEHAVIORAL HEALTH | Facility: CLINIC | Age: 44
End: 2024-10-10
Payer: COMMERCIAL

## 2024-11-06 ENCOUNTER — OFFICE VISIT (OUTPATIENT)
Age: 44
End: 2024-11-06
Payer: COMMERCIAL

## 2024-11-06 VITALS
SYSTOLIC BLOOD PRESSURE: 138 MMHG | DIASTOLIC BLOOD PRESSURE: 85 MMHG | OXYGEN SATURATION: 97 % | HEIGHT: 77 IN | HEART RATE: 77 BPM | WEIGHT: 315 LBS | BODY MASS INDEX: 37.19 KG/M2

## 2024-11-06 DIAGNOSIS — F33.0 MILD EPISODE OF RECURRENT MAJOR DEPRESSIVE DISORDER: Primary | ICD-10-CM

## 2024-11-06 DIAGNOSIS — F43.9 TRAUMA AND STRESSOR-RELATED DISORDER: ICD-10-CM

## 2024-11-06 NOTE — PROGRESS NOTES
"              Follow Up Office Visit      Date: 2024   Patient Name: Dom Russell  : 1980   MRN: [id number]     Referring Provider: Rigoberto Brand MD    Chief Complaint:      ICD-10-CM ICD-9-CM   1. Mild episode of recurrent major depressive disorder  F33.0 296.31   2. Trauma and stressor-related disorder  F43.9 309.81     308.9        History of Present Illness:   Dmo Russell is a 44 y.o. male who is here today for follow up with medication management for depression.  He reports that he is doing fairly well, but states, \"I basically feel the same, I have not noticed any big changes.  I have not had any days with really deep depression though, so I guess that is an improvement.\"  He has been taking Auvelity once daily, and is tolerating medication well; he is willing to increase the dose to see if depression symptoms improve.  He is also curious about if psychotherapy would be recommended for what he has going on.  No SI/HI/psychotic/manic symptoms present, no adverse effects or side effects to current medications noted, and no issues with appetite or sleep reported.     Subjective     Review of Systems:   Review of Systems   Psychiatric/Behavioral:  Positive for depressed mood and stress.        Screening Scores:   PHQ-9: 12 (last visit, 11)  WON-7: 10 (last visit, 13)    Medications:     Current Outpatient Medications:     amLODIPine (NORVASC) 10 MG tablet, Take 1 tablet by mouth Daily., Disp: 90 tablet, Rfl: 3    benazepril (LOTENSIN) 20 MG tablet, Take 1 tablet by mouth daily, Disp: 90 tablet, Rfl: 3    busPIRone (BUSPAR) 5 MG tablet, Take 1 tablet by mouth 3 (Three) Times a Day., Disp: 90 tablet, Rfl: 3    cetirizine (ZyrTEC) 10 MG chewable tablet, 1 tablet., Disp: , Rfl:     Continuous Glucose Sensor (FreeStyle Wen 3 Sensor) misc, Use 1 each Every 14 (Fourteen) Days., Disp: 2 each, Rfl: 11    Dextromethorphan-buPROPion ER (Auvelity)  MG tablet " "controlled-release, Take 1 tablet by mouth Every Morning., Disp: 30 tablet, Rfl: 2    escitalopram (Lexapro) 20 MG tablet, Take 2 tablets by mouth Daily., Disp: 180 tablet, Rfl: 3    fenofibrate micronized (LOFIBRA) 134 MG capsule, 1 capsule., Disp: , Rfl:     metoprolol succinate XL (TOPROL-XL) 50 MG 24 hr tablet, Take 1 tablet by mouth Daily., Disp: 90 tablet, Rfl: 0    omeprazole (priLOSEC) 40 MG capsule, Take 1 capsule by mouth Daily., Disp: 90 capsule, Rfl: 0    rosuvastatin (CRESTOR) 20 MG tablet, Take 1 tablet by mouth Daily., Disp: 90 tablet, Rfl: 0    Semaglutide, 2 MG/DOSE, (Ozempic, 2 MG/DOSE,) 8 MG/3ML solution pen-injector, Inject 2 mg under the skin into the appropriate area as directed 1 (One) Time Per Week., Disp: 3 mL, Rfl: 11    Dextromethorphan-buPROPion ER (Auvelity)  MG tablet controlled-release, Take 1 tablet by mouth Every Morning for 30 days., Disp: 30 tablet, Rfl: 0    Allergies:   Allergies   Allergen Reactions    Amoxicillin Hives    Ceftazidime Hives    Cephalosporins Hives and Shortness Of Breath    Clarithromycin Hives and Anaphylaxis    Codeine Hives    Penicillins Hives and Seizure    Sulbactam Hives    Unasyn [Ampicillin-Sulbactam Sodium] Hives       Results Reviewed: n/a     The following portion of the patient's history were reviewed and updated appropriately: allergies, current and past medications, family history, medical history and social history.    Objective     Vital Signs:   Vitals:    11/06/24 1650   BP: 138/85   Pulse: 77   SpO2: 97%   Weight: (!) 158 kg (348 lb 11.2 oz)   Height: 195.6 cm (77.01\")     Body mass index is 41.34 kg/m².     Mental Status Exam:   MENTAL STATUS EXAM   General Appearance:  Cleanly groomed and dressed  Eye Contact:  Good eye contact  Attitude:  Cooperative  Motor Activity:  Normal gait, posture  Muscle Strength:  Normal  Speech:  Normal rate, tone, volume  Language:  Spontaneous  Mood and affect:  Normal, pleasant, depressed and " blunted  Hopelessness:  3  Loneliness: 3  Thought Process:  Logical  Associations/ Thought Content:  No delusions  Hallucinations:  None  Suicidal Ideations:  Not present  Homicidal Ideation:  Not present  Sensorium:  Alert and clear  Orientation:  Person, place, time and situation  Immediate Recall, Recent, and Remote Memory:  Intact  Attention Span/ Concentration:  Good  Fund of Knowledge:  Appropriate for age and educational level  Intellectual Functioning:  Average range  Insight:  Good  Judgement:  Good  Reliability:  Good  Impulse Control:  Good        SUICIDE RISK ASSESSMENT/CSSRS:  1. Does patient have thoughts of suicide? no  2. Does patient have intent for suicide? no  3. Does patient have a current plan for suicide? no  4. History of suicide attempts: no  5. Family history of suicide or attempts: no  6. History of violent behaviors towards others or property or thoughts of committing suicide: no  7. History of sexual aggression toward others: no  8. Access to firearms or weapons: no    Labs Reviewed: n/a  UDS Reviewed: n/a  Chart since last visit reviewed: yes    Assessment / Plan    Quality Measures:  Tobacco cessation: Patient is a former tobacco user. No tobacco cessation education necessary.    Depression (PHQ >9): Patient screened positive for depression with a PHQ score of 12. Follow up recommendations include medication management, suicide risk assessment, continued screening score monitoring and supportive care.    Medication Considerations:  Benzo: n/a  Stimulants: n/a   ROBSON reviewed and appropriate.     Risk Assessment: Risk of self-harm acutely is low. Risk of self-harm chronically is also low, but could be further elevated in the event of treatment noncompliance and/or AODA.    Visit Diagnosis/Orders Placed This Visit:  Diagnoses and all orders for this visit:    1. Mild episode of recurrent major depressive disorder (Primary)    2. Trauma and stressor-related disorder          Impression/Formulation:  Patient appeared alert and oriented.  Patient is voluntarily continuing to receive psychiatric care at [facility].   Patient is receptive to assistance with maintaining a stable lifestyle.  Patient presents with history of     ICD-10-CM ICD-9-CM   1. Mild episode of recurrent major depressive disorder  F33.0 296.31   2. Trauma and stressor-related disorder  F43.9 309.81     308.9   .     Treatment Plan:   Increase Auvelity  mg to twice daily. Patient just picked up the prescription (he was using the samples for most of the last month), and will try taking twice daily, then notify provider if he wishes to continue at twice daily dosing. Meanwhile, we will likely change from Lexapro to a SNRI (Pristiq or Cymbalta), to help with chronic pain as well as depression at future visits, so he needs to start tapering down the Lexapro.  Advised patient to take 20 mg of Lexapro daily instead of 40.  Encouraged patient to seek psychotherapy, as a trauma informed therapist could be very helpful.  Follow up in 4 weeks.      Patient will continue supportive psychotherapy efforts and medications as indicated.  Discussed medication options and treatment plan of prescribed medication(s) as well as the risks, benefits, and potential side effects. Patient will contact this office, call 911 or present to the nearest emergency room should suicidal or homicidal ideations occur. Clinic will obtain release of information for current treatment team for continuity of care as needed. Patient ackowledged and verbally consented to continue with current treatment plan and was educated on the importance of compliance with treatment and follow-up appointments.     Follow Up:   Return in about 4 weeks (around 12/4/2024) for Medication Management.        MELL Mascorro  [facility]    This is electronically signed by MELL Mascorro  11/06/2024 16:57 EST

## 2024-11-26 DIAGNOSIS — F41.8 SITUATIONAL ANXIETY: Primary | ICD-10-CM

## 2024-11-26 DIAGNOSIS — F33.0 MILD EPISODE OF RECURRENT MAJOR DEPRESSIVE DISORDER: ICD-10-CM

## 2024-11-26 NOTE — TELEPHONE ENCOUNTER
Incoming Refill Request      Medication requested (name and dose): [facility]      Pharmacy where request should be sent: [facility]    Additional details provided by patient: Patient is also requesting a medication to help out because he is getting ready to go on a road trip that will take about 20 hours and he needs something to help if needed during stressful times    Best call back number: [phone]    Does the patient have less than a 3 day supply:  [x] Yes  [] No    George Gore  11/26/24, 14:28 EST

## 2024-11-27 RX ORDER — PROPRANOLOL HCL 20 MG
TABLET ORAL
Qty: 90 TABLET | Refills: 0 | Status: SHIPPED | OUTPATIENT
Start: 2024-11-27

## 2024-11-27 RX ORDER — DEXTROMETHORPHAN HYDROBROMIDE, BUPROPION HYDROCHLORIDE 105; 45 MG/1; MG/1
1 TABLET, MULTILAYER, EXTENDED RELEASE ORAL 2 TIMES DAILY
Qty: 60 TABLET | Refills: 2 | Status: SHIPPED | OUTPATIENT
Start: 2024-11-27

## 2024-11-27 NOTE — TELEPHONE ENCOUNTER
Called patient; he has a previous prescription of BuSpar that he does not take very often, but says it did not seem to work.  Advised patient to try higher dose of BuSpar, and will also send in propranolol 10 to 20 mg up to 3 times daily as needed.  Patient is on metoprolol, advised him to not take the medications near each other, and start with the lowest dose of propranolol.  He plans on trying medications these next few days, with the road trip starting on Sunday.

## 2024-12-09 ENCOUNTER — OFFICE VISIT (OUTPATIENT)
Dept: FAMILY MEDICINE CLINIC | Facility: CLINIC | Age: 44
End: 2024-12-09
Payer: COMMERCIAL

## 2024-12-09 VITALS
HEART RATE: 91 BPM | BODY MASS INDEX: 36.45 KG/M2 | OXYGEN SATURATION: 99 % | SYSTOLIC BLOOD PRESSURE: 154 MMHG | DIASTOLIC BLOOD PRESSURE: 84 MMHG | HEIGHT: 78 IN | TEMPERATURE: 97.7 F | WEIGHT: 315 LBS

## 2024-12-09 DIAGNOSIS — I10 PRIMARY HYPERTENSION: ICD-10-CM

## 2024-12-09 DIAGNOSIS — E66.01 MORBID (SEVERE) OBESITY DUE TO EXCESS CALORIES: ICD-10-CM

## 2024-12-09 DIAGNOSIS — K75.81 METABOLIC DYSFUNCTION-ASSOCIATED STEATOHEPATITIS (MASH): ICD-10-CM

## 2024-12-09 DIAGNOSIS — E11.9 TYPE 2 DIABETES MELLITUS WITHOUT COMPLICATION, WITHOUT LONG-TERM CURRENT USE OF INSULIN: Primary | ICD-10-CM

## 2024-12-09 DIAGNOSIS — Z23 NEED FOR INFLUENZA VACCINATION: ICD-10-CM

## 2024-12-09 PROBLEM — G89.29 CHRONIC PAIN: Status: ACTIVE | Noted: 2024-09-04

## 2024-12-09 LAB
EXPIRATION DATE: ABNORMAL
HBA1C MFR BLD: 6.8 % (ref 4.5–5.7)
Lab: ABNORMAL

## 2024-12-09 PROCEDURE — 83036 HEMOGLOBIN GLYCOSYLATED A1C: CPT | Performed by: FAMILY MEDICINE

## 2024-12-09 PROCEDURE — 90471 IMMUNIZATION ADMIN: CPT | Performed by: FAMILY MEDICINE

## 2024-12-09 PROCEDURE — 99214 OFFICE O/P EST MOD 30 MIN: CPT | Performed by: FAMILY MEDICINE

## 2024-12-09 PROCEDURE — 90656 IIV3 VACC NO PRSV 0.5 ML IM: CPT | Performed by: FAMILY MEDICINE

## 2024-12-09 RX ORDER — METFORMIN HYDROCHLORIDE 500 MG/1
1000 TABLET, EXTENDED RELEASE ORAL 2 TIMES DAILY
Qty: 360 TABLET | Refills: 3 | Status: SHIPPED | OUTPATIENT
Start: 2024-12-09

## 2024-12-09 NOTE — PROGRESS NOTES
Follow Up Office Visit      Date: 2024   Patient Name: Dom Russell  : 1980   MRN: [id number]     Chief Complaint:    Chief Complaint   Patient presents with    Follow-up     Medications,DM, HTN       History of Present Illness: Dom Russell is a 44 y.o. male who is here today for follow-up.  Patient has been doing relatively well since last being seen.  He has been tolerating his GLP-1 agonist without difficulty.  He is afraid that his hemoglobin A1c may be worsening and is wanting to go back on his metformin.  He does continue with his other medications as prescribed.  He does continue to have daily diarrhea.  He has not had any other problems at present time.  He does tolerate his other medications without complaints.  Patient appears to be doing otherwise well.  They have continue with their medications without any side effects.  They have not had any changes in their usual activity, appetite and sleep.  Patient denies any other cardiovascular, respiratory, gastrointestinal, urologic or neurologic complaints.    History of Present Illness         Subjective      Review of Systems:   Review of Systems   Constitutional:  Negative for activity change, appetite change and fatigue.   Respiratory:  Negative for cough, chest tightness, shortness of breath and wheezing.    Cardiovascular:  Negative for chest pain, palpitations and leg swelling.   Gastrointestinal:  Negative for abdominal distention, abdominal pain, blood in stool, constipation, diarrhea, nausea, vomiting, GERD and indigestion.   Genitourinary:  Negative for difficulty urinating, dysuria, flank pain, frequency, hematuria and urgency.   Musculoskeletal:  Negative for arthralgias, back pain, gait problem, joint swelling and myalgias.   Neurological:  Negative for dizziness, tremors, seizures, syncope, weakness, light-headedness, numbness, headache and memory problem.   Psychiatric/Behavioral:  Negative for sleep  disturbance and depressed mood. The patient is not nervous/anxious.        I have reviewed the patients family history, social history, past medical history, past surgical history and have updated it as appropriate.     Medications:     Current Outpatient Medications:     amLODIPine (NORVASC) 10 MG tablet, Take 1 tablet by mouth Daily., Disp: 90 tablet, Rfl: 3    benazepril (LOTENSIN) 20 MG tablet, Take 1 tablet by mouth daily, Disp: 90 tablet, Rfl: 3    cetirizine (ZyrTEC) 10 MG chewable tablet, 1 tablet., Disp: , Rfl:     Continuous Glucose Sensor (FreeStyle Wen 3 Sensor) misc, Use 1 each Every 14 (Fourteen) Days., Disp: 2 each, Rfl: 11    Dextromethorphan-buPROPion ER (Auvelity)  MG tablet controlled-release, Take 1 tablet by mouth 2 (Two) Times a Day., Disp: 60 tablet, Rfl: 2    escitalopram (Lexapro) 20 MG tablet, Take 2 tablets by mouth Daily., Disp: 180 tablet, Rfl: 3    fenofibrate micronized (LOFIBRA) 134 MG capsule, 1 capsule., Disp: , Rfl:     metoprolol succinate XL (TOPROL-XL) 50 MG 24 hr tablet, Take 1 tablet by mouth Daily., Disp: 90 tablet, Rfl: 0    omeprazole (priLOSEC) 40 MG capsule, Take 1 capsule by mouth Daily., Disp: 90 capsule, Rfl: 0    propranolol (INDERAL) 20 MG tablet, Take 1/2 to 1 tablet (10 to 20 mg) up to three time daily as needed for anxiety., Disp: 90 tablet, Rfl: 0    rosuvastatin (CRESTOR) 20 MG tablet, Take 1 tablet by mouth Daily., Disp: 90 tablet, Rfl: 0    Semaglutide, 2 MG/DOSE, (Ozempic, 2 MG/DOSE,) 8 MG/3ML solution pen-injector, Inject 2 mg under the skin into the appropriate area as directed 1 (One) Time Per Week., Disp: 3 mL, Rfl: 11    metFORMIN ER (GLUCOPHAGE-XR) 500 MG 24 hr tablet, Take 2 tablets by mouth 2 (Two) Times a Day., Disp: 360 tablet, Rfl: 3    Allergies:   Allergies   Allergen Reactions    Amoxicillin Hives    Ceftazidime Hives    Cephalosporins Hives and Shortness Of Breath    Clarithromycin Hives and Anaphylaxis    Codeine Hives    Penicillins  "Hives and Seizure    Sulbactam Hives    Unasyn [Ampicillin-Sulbactam Sodium] Hives       Immunizations:   Immunization History   Administered Date(s) Administered    Flu Vaccine Quad PF >36MO 10/18/2021, 10/03/2022    Fluzone  >6mos 12/09/2024    Fluzone (or Fluarix & Flulaval for VFC) >6mos 10/18/2021, 10/03/2022    Fluzone High-Dose 65+YRS 10/30/2017    Fluzone Quad >6mos (Multi-dose) 11/30/2018, 10/01/2019    Hepatitis A 11/01/2018, 11/30/2018, 06/21/2019    Hepatitis B Adult/Adolescent IM 02/14/2006, 06/27/2006, 08/29/2006, 08/31/2020, 11/09/2020    Influenza, Unspecified 12/28/2023    Pneumococcal Conjugate 20-Valent (PCV20) 03/07/2023    Pneumococcal Polysaccharide (PPSV23) 12/20/2011, 06/06/2019    Td (TDVAX) 08/14/1997    Tdap 12/07/2022        Objective     Physical Exam: Please see above  Vital Signs:   Vitals:    12/09/24 1658 12/09/24 1718   BP: 148/86 154/84   BP Location: Left arm    Patient Position: Sitting    Cuff Size: Large Adult    Pulse: 91    Temp: 97.7 °F (36.5 °C)    TempSrc: Temporal    SpO2: 99%    Weight: (!) 161 kg (354 lb)    Height: 198.1 cm (78\")    PainSc: 0-No pain      Body mass index is 40.91 kg/m².          Physical Exam  Vitals and nursing note reviewed.   Constitutional:       Appearance: Normal appearance.   HENT:      Head: Normocephalic and atraumatic.      Nose: Nose normal.      Mouth/Throat:      Pharynx: Oropharynx is clear.   Eyes:      Extraocular Movements: Extraocular movements intact.      Pupils: Pupils are equal, round, and reactive to light.   Neck:      Thyroid: No thyroid mass or thyromegaly.      Trachea: Trachea normal.   Cardiovascular:      Rate and Rhythm: Normal rate and regular rhythm.      Pulses: Normal pulses. No decreased pulses.      Heart sounds: Normal heart sounds.   Pulmonary:      Effort: Pulmonary effort is normal.      Breath sounds: Normal breath sounds.   Abdominal:      General: Abdomen is flat. Bowel sounds are normal.      Palpations: " Abdomen is soft.      Tenderness: There is no abdominal tenderness.   Musculoskeletal:      Cervical back: Neck supple.      Right lower leg: No edema.      Left lower leg: No edema.   Lymphadenopathy:      Cervical: No cervical adenopathy.   Skin:     General: Skin is warm and dry.   Neurological:      General: No focal deficit present.      Mental Status: He is alert and oriented to person, place, and time.      Sensory: Sensation is intact.      Motor: Motor function is intact.      Coordination: Coordination is intact.   Psychiatric:         Attention and Perception: Attention normal.         Mood and Affect: Mood normal.         Speech: Speech normal.         Behavior: Behavior normal.         Procedures    Results:   Labs:   Hemoglobin A1C   Date Value Ref Range Status   12/09/2024 6.8 (A) 4.5 - 5.7 % Final   09/06/2024 7.10 (H) 4.80 - 5.60 % Final     TSH   Date Value Ref Range Status   09/06/2024 2.240 0.270 - 4.200 uIU/mL Final        POCT Results (if applicable):   Results for orders placed or performed in visit on 12/09/24   POC Glycosylated Hemoglobin (Hb A1C)    Collection Time: 12/09/24  5:21 PM    Specimen: Blood   Result Value Ref Range    Hemoglobin A1C 6.8 (A) 4.5 - 5.7 %    Lot Number [id number]     Expiration Date 08/29/2026        Imaging:   No valid procedures specified.     Measures:   Advanced Care Planning:   Patient does not have an advance directive, information provided.    Smoking Cessation:   Non-smoker.    Assessment / Plan      Assessment/Plan:   Diagnoses and all orders for this visit:    1. Type 2 diabetes mellitus without complication, without long-term current use of insulin (Primary)  Patient does appear to be doing relatively well with respect to their diabetes.  They are tolerating their medications without complaints.  We will continue to follow their hemoglobin A1c and will make adjustments to keep their level less than 7%.  Patient's hemoglobin A1c has improved to 6.8%.  We  have discussed options and we will go ahead and pursue with restarting the metformin.  We will continue to monitor his symptom of diarrhea.  We will continue to monitor his hemoglobin A1c and adjust accordingly.  -     POC Glycosylated Hemoglobin (Hb A1C)  -     metFORMIN ER (GLUCOPHAGE-XR) 500 MG 24 hr tablet; Take 2 tablets by mouth 2 (Two) Times a Day.  Dispense: 360 tablet; Refill: 3    2. Primary hypertension   Patient appears to be tolerating their blood pressure medicine without any side effects.  We will continue to monitor their blood pressure and will adjust to keep there is systolic blood pressure less than 130.  We will continue to monitor renal function and will make adjustments based on these findings.  Blood pressure is a little bit elevated at present time.  We will continue to monitor and if he does continue to have blood pressure that is elevated at home he will contact us and we will make modifications.    3. Metabolic dysfunction-associated steatohepatitis (MASH)   Patient does appear to have metabolic dysfunction of your liver secondary to fatty liver disease.  They understands the importance of a low carbohydrate/low-fat diet as well as 300 minutes of aerobic exercise weekly.  We will continue to monitor and will obtain laboratory data intermittently to assure us there is no progression of the disease.  A liver ultrasound as well elastography may be consider intermittently.  Patient understands the importance of weight loss.  There may be an indication of use of a GLP-1 agonist.    4. Morbid (severe) obesity due to excess calories   Patient is tolerating the GLP-1 agonist.  We have discussed other options and will try to increase his activity level to 300 minutes weekly.  We will continue to monitor and add metformin.  He understands the importance of exercise and modification of his diet.  We will make adjustments in the future as necessary.    5. Need for influenza vaccination  -     Fluzone  >6mos ([id number])        Follow Up:   Return in about 3 months (around 3/9/2025).      At [facility], we believe that sharing information builds trust and better relationships. You are receiving this note because you recently visited [facility]. It is possible you will see health information before a provider has talked with you about it. This kind of information can be easy to misunderstand. To help you fully understand what it means for your health, we urge you to discuss this note with your provider.    Rigoberto Brand MD  [facility]

## 2024-12-11 ENCOUNTER — OFFICE VISIT (OUTPATIENT)
Age: 44
End: 2024-12-11
Payer: COMMERCIAL

## 2024-12-11 VITALS
OXYGEN SATURATION: 97 % | HEART RATE: 67 BPM | HEIGHT: 78 IN | DIASTOLIC BLOOD PRESSURE: 85 MMHG | BODY MASS INDEX: 36.45 KG/M2 | SYSTOLIC BLOOD PRESSURE: 166 MMHG | WEIGHT: 315 LBS

## 2024-12-11 DIAGNOSIS — F32.A ANXIETY AND DEPRESSION: ICD-10-CM

## 2024-12-11 DIAGNOSIS — F41.9 ANXIETY AND DEPRESSION: ICD-10-CM

## 2024-12-11 DIAGNOSIS — F33.0 MILD EPISODE OF RECURRENT MAJOR DEPRESSIVE DISORDER: Primary | ICD-10-CM

## 2024-12-11 DIAGNOSIS — F43.9 TRAUMA AND STRESSOR-RELATED DISORDER: ICD-10-CM

## 2024-12-11 RX ORDER — ESCITALOPRAM OXALATE 20 MG/1
40 TABLET ORAL DAILY
Qty: 60 TABLET | Refills: 2 | Status: SHIPPED | OUTPATIENT
Start: 2024-12-11

## 2024-12-11 RX ORDER — BUSPIRONE HYDROCHLORIDE 5 MG/1
5 TABLET ORAL 3 TIMES DAILY
Qty: 90 TABLET | Refills: 1 | Status: SHIPPED | OUTPATIENT
Start: 2024-12-11

## 2024-12-11 NOTE — PROGRESS NOTES
"              Follow Up Office Visit      Date: 2024   Patient Name: Dom Russell  : 1980   MRN: [id number]     Referring Provider: Rigoberto Brand MD    Chief Complaint:      ICD-10-CM ICD-9-CM   1. Mild episode of recurrent major depressive disorder  F33.0 296.31   2. Anxiety and depression  F41.9 300.00    F32.A 311   3. Trauma and stressor-related disorder  F43.9 309.81     308.9        History of Present Illness:   Dom Russell is a 44 y.o. male who is here today for follow up with medication management for anxiety and depression.  Patient reports that he has been doing fairly well, up until the last few days; stressors at work have caused an increase in irritability and anxiety.  Patient states, \"Everything would be great if I did not have to deal with that.  My blood pressure is up.  I have this feeling of dread right in the middle of my chest, and I am aggravated.\"  He has been taking both Auvelity doses at the same time, along with 40 mg of Lexapro, 5 mg of BuSpar, and 20 mg of propranolol, all once daily.  He reports that this combination was easy to remember, and he has felt calm and collected once the medications took effect.  He reports that [place] was [place], with minimal irritability on his part.  No SI/HI/psychotic/manic symptoms present, no adverse effects or side effects to current medications noted, and no issues with appetite or sleep reported; no medication changes requested today.    Subjective     Review of Systems:   Review of Systems   Psychiatric/Behavioral:  Positive for depressed mood and stress.        Screening Scores:   PHQ-9: 13 (last visit, 12)  WON-7: 13 (last visit, 10)    Medications:     Current Outpatient Medications:     amLODIPine (NORVASC) 10 MG tablet, Take 1 tablet by mouth Daily., Disp: 90 tablet, Rfl: 3    benazepril (LOTENSIN) 20 MG tablet, Take 1 tablet by mouth daily, Disp: 90 " tablet, Rfl: 3    cetirizine (ZyrTEC) 10 MG chewable tablet, 1 tablet., Disp: , Rfl:     Continuous Glucose Sensor (FreeStyle Wen 3 Sensor) misc, Use 1 each Every 14 (Fourteen) Days., Disp: 2 each, Rfl: 11    Dextromethorphan-buPROPion ER (Auvelity)  MG tablet controlled-release, Take 1 tablet by mouth 2 (Two) Times a Day., Disp: 60 tablet, Rfl: 2    escitalopram (Lexapro) 20 MG tablet, Take 2 tablets by mouth Daily., Disp: 60 tablet, Rfl: 2    fenofibrate micronized (LOFIBRA) 134 MG capsule, 1 capsule., Disp: , Rfl:     metFORMIN ER (GLUCOPHAGE-XR) 500 MG 24 hr tablet, Take 2 tablets by mouth 2 (Two) Times a Day., Disp: 360 tablet, Rfl: 3    metoprolol succinate XL (TOPROL-XL) 50 MG 24 hr tablet, Take 1 tablet by mouth Daily., Disp: 90 tablet, Rfl: 0    omeprazole (priLOSEC) 40 MG capsule, Take 1 capsule by mouth Daily., Disp: 90 capsule, Rfl: 0    propranolol (INDERAL) 20 MG tablet, Take 1/2 to 1 tablet (10 to 20 mg) up to three time daily as needed for anxiety., Disp: 90 tablet, Rfl: 0    rosuvastatin (CRESTOR) 20 MG tablet, Take 1 tablet by mouth Daily., Disp: 90 tablet, Rfl: 0    Semaglutide, 2 MG/DOSE, (Ozempic, 2 MG/DOSE,) 8 MG/3ML solution pen-injector, Inject 2 mg under the skin into the appropriate area as directed 1 (One) Time Per Week., Disp: 3 mL, Rfl: 11    busPIRone (BUSPAR) 5 MG tablet, Take 1 tablet by mouth 3 (Three) Times a Day., Disp: 90 tablet, Rfl: 1    Allergies:   Allergies   Allergen Reactions    Amoxicillin Hives    Ceftazidime Hives    Cephalosporins Hives and Shortness Of Breath    Clarithromycin Hives and Anaphylaxis    Codeine Hives    Penicillins Hives and Seizure    Sulbactam Hives    Unasyn [Ampicillin-Sulbactam Sodium] Hives       Results Reviewed: n/a     The following portion of the patient's history were reviewed and updated appropriately: allergies, current and past medications, family history, medical history and social history.    Objective     Vital Signs:   Vitals:  "   12/11/24 1636   BP: 166/85   Pulse: 67   SpO2: 97%   Weight: (!) 161 kg (354 lb 1.6 oz)   Height: 198.1 cm (78\")     Body mass index is 40.92 kg/m².     Mental Status Exam:   MENTAL STATUS EXAM   General Appearance:  Cleanly groomed and dressed  Eye Contact:  Good eye contact  Attitude:  Cooperative  Motor Activity:  Normal gait, posture  Muscle Strength:  Normal  Speech:  Normal rate, tone, volume  Language:  Spontaneous  Mood and affect:  Normal, pleasant  Hopelessness:  2  Loneliness: 2  Thought Process:  Logical  Associations/ Thought Content:  No delusions  Hallucinations:  None  Suicidal Ideations:  Not present  Homicidal Ideation:  Not present  Sensorium:  Alert and clear  Orientation:  Person, place, time and situation  Immediate Recall, Recent, and Remote Memory:  Intact  Attention Span/ Concentration:  Good  Fund of Knowledge:  Appropriate for age and educational level  Intellectual Functioning:  Average range  Insight:  Good  Judgement:  Good  Reliability:  Good  Impulse Control:  Good        SUICIDE RISK ASSESSMENT/CSSRS:  1. Does patient have thoughts of suicide? no  2. Does patient have intent for suicide? no  3. Does patient have a current plan for suicide? no  4. History of suicide attempts: no  5. Family history of suicide or attempts: no  6. History of violent behaviors towards others or property or thoughts of committing suicide: no  7. History of sexual aggression toward others: no  8. Access to firearms or weapons: no    Labs Reviewed: n/a  UDS Reviewed: n/a  Chart since last visit reviewed: yes    Assessment / Plan    Quality Measures:  Tobacco cessation: Patient is a former tobacco user. No tobacco cessation education necessary.      Depression (PHQ >9): Patient screened positive for depression with a PHQ score of 13. Follow up recommendations include medication management, suicide risk assessment, continued screening score monitoring and supportive care.     Medication " Considerations:  Benzo: n/a  Stimulants: n/a   ROBSON reviewed and appropriate.     Risk Assessment: Risk of self-harm acutely is low. Risk of self-harm chronically is also low, but could be further elevated in the event of treatment noncompliance and/or AODA.    Visit Diagnosis/Orders Placed This Visit:  Diagnoses and all orders for this visit:    1. Mild episode of recurrent major depressive disorder (Primary)    2. Anxiety and depression  -     escitalopram (Lexapro) 20 MG tablet; Take 2 tablets by mouth Daily.  Dispense: 60 tablet; Refill: 2  -     busPIRone (BUSPAR) 5 MG tablet; Take 1 tablet by mouth 3 (Three) Times a Day.  Dispense: 90 tablet; Refill: 1    3. Trauma and stressor-related disorder         Impression/Formulation:  Patient appeared alert and oriented.  Patient is voluntarily continuing to receive psychiatric care at [facility].   Patient is receptive to assistance with maintaining a stable lifestyle.  Patient presents with history of     ICD-10-CM ICD-9-CM   1. Mild episode of recurrent major depressive disorder  F33.0 296.31   2. Anxiety and depression  F41.9 300.00    F32.A 311   3. Trauma and stressor-related disorder  F43.9 309.81     308.9   .     Treatment Plan:   Continue Auvelity  mg twice daily.  Continue Lexapro 40 mg once daily.  Continue BuSpar 5 mg up to 3 times daily.  Continue propranolol 10 to 20 mg up to 3 times daily as needed.  Reminded patient that he could take additional doses of BuSpar and propranolol if necessary, to provide pharmacologic support for increased stressors.  Follow-up in 3 months.    Any medications prescribed have been sent electronically to [facility] in [place].     Patient will continue supportive psychotherapy efforts and medications as indicated.  Discussed medication options and treatment plan of prescribed medication(s) as well as the risks, benefits, and potential side effects. Patient will contact this  office, call 911 or present to the nearest emergency room should suicidal or homicidal ideations occur. Clinic will obtain release of information for current treatment team for continuity of care as needed. Patient ackowledged and verbally consented to continue with current treatment plan and was educated on the importance of compliance with treatment and follow-up appointments.     Follow Up:   Return in about 3 months (around 3/11/2025) for Medication Management.        MELL Mascorro  [facility]    This is electronically signed by MELL Mascorro  12/11/2024 12:52 EST

## 2024-12-13 DIAGNOSIS — K21.9 CHRONIC GERD: ICD-10-CM

## 2024-12-13 RX ORDER — ROSUVASTATIN CALCIUM 20 MG/1
20 TABLET, COATED ORAL DAILY
Qty: 90 TABLET | Refills: 1 | Status: SHIPPED | OUTPATIENT
Start: 2024-12-13

## 2024-12-13 RX ORDER — OMEPRAZOLE 40 MG/1
40 CAPSULE, DELAYED RELEASE ORAL DAILY
Qty: 90 CAPSULE | Refills: 1 | Status: SHIPPED | OUTPATIENT
Start: 2024-12-13

## 2024-12-13 RX ORDER — METOPROLOL SUCCINATE 50 MG/1
50 TABLET, EXTENDED RELEASE ORAL DAILY
Qty: 90 TABLET | Refills: 1 | Status: SHIPPED | OUTPATIENT
Start: 2024-12-13

## 2025-02-11 ENCOUNTER — OFFICE VISIT (OUTPATIENT)
Dept: FAMILY MEDICINE CLINIC | Facility: CLINIC | Age: 45
End: 2025-02-11
Payer: COMMERCIAL

## 2025-02-11 VITALS
WEIGHT: 315 LBS | HEIGHT: 78 IN | SYSTOLIC BLOOD PRESSURE: 128 MMHG | DIASTOLIC BLOOD PRESSURE: 84 MMHG | OXYGEN SATURATION: 97 % | HEART RATE: 90 BPM | TEMPERATURE: 97.2 F | BODY MASS INDEX: 36.45 KG/M2

## 2025-02-11 DIAGNOSIS — R50.9 FEVER, UNSPECIFIED FEVER CAUSE: Primary | ICD-10-CM

## 2025-02-11 DIAGNOSIS — J01.40 ACUTE NON-RECURRENT PANSINUSITIS: ICD-10-CM

## 2025-02-11 LAB
EXPIRATION DATE: NORMAL
EXPIRATION DATE: NORMAL
FLUAV AG UPPER RESP QL IA.RAPID: NOT DETECTED
FLUBV AG UPPER RESP QL IA.RAPID: NOT DETECTED
INTERNAL CONTROL: NORMAL
INTERNAL CONTROL: NORMAL
Lab: NORMAL
Lab: NORMAL
S PYO AG THROAT QL: NEGATIVE
SARS-COV-2 AG UPPER RESP QL IA.RAPID: NOT DETECTED

## 2025-02-11 PROCEDURE — 87428 SARSCOV & INF VIR A&B AG IA: CPT | Performed by: FAMILY MEDICINE

## 2025-02-11 PROCEDURE — 87880 STREP A ASSAY W/OPTIC: CPT | Performed by: FAMILY MEDICINE

## 2025-02-11 PROCEDURE — 99213 OFFICE O/P EST LOW 20 MIN: CPT | Performed by: FAMILY MEDICINE

## 2025-02-11 RX ORDER — PREDNISONE 10 MG/1
TABLET ORAL
Qty: 36 TABLET | Refills: 0 | Status: SHIPPED | OUTPATIENT
Start: 2025-02-11 | End: 2025-02-18

## 2025-02-11 RX ORDER — DOXYCYCLINE 100 MG/1
100 CAPSULE ORAL 2 TIMES DAILY
Qty: 20 CAPSULE | Refills: 0 | Status: SHIPPED | OUTPATIENT
Start: 2025-02-11

## 2025-02-11 NOTE — PROGRESS NOTES
Follow Up Office Visit      Date: 2025   Patient Name: Dom Russell  : 1980   MRN: [id number]     Chief Complaint:    Chief Complaint   Patient presents with    Fever     SORE THROAT SINCE YETERDAY       History of Present Illness: Dom Russell is a 44 y.o. male who is here today for evaluation of fever and sore throat.    History of Present Illness  The patient is a 44-year-old male who presents for evaluation of sore throat, sinus congestion, and ear pressure.    He began experiencing symptoms yesterday, initially presenting as a burning sensation in his throat, which subsequently evolved into a sore throat. He also reports swelling of his lymph nodes. He has not experienced any fever, with the highest recorded temperature being 99.9 degrees. He reports no recent cough or runny nose, although he did have a cough following a previous COVID-19 infection. He has not experienced any nausea or vomiting but does report headaches.    Additionally, he has been dealing with sinus congestion for approximately one week. He experiences significant sinus pressure, which at times is so severe that it impairs his hearing and prevents him from equalizing the pressure in his ears.    ALLERGIES  The patient has an allergic reaction to AUGMENTIN.     Patient appears to be doing otherwise well.  They have continue with their medications without any side effects.  They have not had any changes in their usual activity, appetite and sleep.  Patient denies any other cardiovascular, respiratory, gastrointestinal, urologic or neurologic complaints.    Subjective      Review of Systems:   Review of Systems   Constitutional:  Positive for fever. Negative for activity change, appetite change and fatigue.   HENT:  Positive for congestion, postnasal drip, rhinorrhea and sore throat.    Respiratory:  Negative for cough, chest tightness, shortness of breath and wheezing.    Cardiovascular:  Negative for chest  pain, palpitations and leg swelling.   Gastrointestinal:  Negative for abdominal distention, abdominal pain, blood in stool, constipation, diarrhea, nausea, vomiting, GERD and indigestion.   Genitourinary:  Negative for difficulty urinating, dysuria, flank pain, frequency, hematuria and urgency.   Musculoskeletal:  Negative for arthralgias, back pain, gait problem, joint swelling and myalgias.   Neurological:  Positive for headache. Negative for dizziness, tremors, seizures, syncope, weakness, light-headedness, numbness and memory problem.   Psychiatric/Behavioral:  Negative for sleep disturbance and depressed mood. The patient is not nervous/anxious.        I have reviewed the patients family history, social history, past medical history, past surgical history and have updated it as appropriate.     Medications:     Current Outpatient Medications:     amLODIPine (NORVASC) 10 MG tablet, Take 1 tablet by mouth Daily., Disp: 90 tablet, Rfl: 3    benazepril (LOTENSIN) 20 MG tablet, Take 1 tablet by mouth daily, Disp: 90 tablet, Rfl: 3    busPIRone (BUSPAR) 5 MG tablet, Take 1 tablet by mouth 3 (Three) Times a Day., Disp: 90 tablet, Rfl: 1    cetirizine (ZyrTEC) 10 MG chewable tablet, 1 tablet., Disp: , Rfl:     Continuous Glucose Sensor (FreeStyle Wen 3 Sensor) misc, Use 1 each Every 14 (Fourteen) Days., Disp: 2 each, Rfl: 11    Dextromethorphan-buPROPion ER (Auvelity)  MG tablet controlled-release, Take 1 tablet by mouth 2 (Two) Times a Day., Disp: 60 tablet, Rfl: 2    escitalopram (Lexapro) 20 MG tablet, Take 2 tablets by mouth Daily., Disp: 60 tablet, Rfl: 2    fenofibrate micronized (LOFIBRA) 134 MG capsule, 1 capsule., Disp: , Rfl:     metFORMIN ER (GLUCOPHAGE-XR) 500 MG 24 hr tablet, Take 2 tablets by mouth 2 (Two) Times a Day., Disp: 360 tablet, Rfl: 3    metoprolol succinate XL (TOPROL-XL) 50 MG 24 hr tablet, Take 1 tablet by mouth Daily., Disp: 90 tablet, Rfl: 1    omeprazole (priLOSEC) 40 MG capsule,  Take 1 capsule by mouth Daily., Disp: 90 capsule, Rfl: 1    propranolol (INDERAL) 20 MG tablet, Take 1/2 to 1 tablet (10 to 20 mg) up to three time daily as needed for anxiety., Disp: 90 tablet, Rfl: 0    rosuvastatin (CRESTOR) 20 MG tablet, Take 1 tablet by mouth Daily., Disp: 90 tablet, Rfl: 1    Semaglutide, 2 MG/DOSE, (Ozempic, 2 MG/DOSE,) 8 MG/3ML solution pen-injector, Inject 2 mg under the skin into the appropriate area as directed 1 (One) Time Per Week., Disp: 3 mL, Rfl: 11    doxycycline (VIBRAMYCIN) 100 MG capsule, Take 1 capsule by mouth 2 (Two) Times a Day., Disp: 20 capsule, Rfl: 0    predniSONE (DELTASONE) 10 MG tablet, Take 8 tablets by mouth Daily for 1 day, THEN 7 tablets Daily for 1 day, THEN 6 tablets Daily for 1 day, THEN 5 tablets Daily for 1 day, THEN 4 tablets Daily for 1 day, THEN 3 tablets Daily for 1 day, THEN 2 tablets Daily for 1 day, THEN 1 tablet Daily for 1 day., Disp: 36 tablet, Rfl: 0    Allergies:   Allergies   Allergen Reactions    Amoxicillin Hives    Ceftazidime Hives    Cephalosporins Hives and Shortness Of Breath    Clarithromycin Hives and Anaphylaxis    Codeine Hives    Penicillins Hives and Seizure    Sulbactam Hives    Unasyn [Ampicillin-Sulbactam Sodium] Hives       Immunizations:   Immunization History   Administered Date(s) Administered    Flu Vaccine Quad PF >36MO 10/18/2021, 10/03/2022    Fluzone  >6mos 12/09/2024    Fluzone (or Fluarix & Flulaval for VFC) >6mos 10/18/2021, 10/03/2022    Fluzone High-Dose 65+YRS 10/30/2017    Fluzone Quad >6mos (Multi-dose) 11/30/2018, 10/01/2019    Hepatitis A 11/01/2018, 11/30/2018, 06/21/2019    Hepatitis B Adult/Adolescent IM 02/14/2006, 06/27/2006, 08/29/2006, 08/31/2020, 11/09/2020    Influenza, Unspecified 12/28/2023    Pneumococcal Conjugate 20-Valent (PCV20) 03/07/2023    Pneumococcal Polysaccharide (PPSV23) 12/20/2011, 06/06/2019    Td (TDVAX) 08/14/1997    Tdap 12/07/2022        Objective     Physical Exam: Please see  "above  Vital Signs:   Vitals:    02/11/25 1209   BP: 128/84   BP Location: Left arm   Patient Position: Sitting   Cuff Size: Large Adult   Pulse: 90   Temp: 97.2 °F (36.2 °C)   TempSrc: Temporal   SpO2: 97%   Weight: (!) 160 kg (352 lb)   Height: 198.1 cm (78\")   PainSc:   8   PainLoc: Throat     Body mass index is 40.68 kg/m².          Physical Exam  Vitals and nursing note reviewed.   Constitutional:       Appearance: Normal appearance.   HENT:      Head: Normocephalic and atraumatic.      Nose: Nose normal.      Mouth/Throat:      Pharynx: Oropharynx is clear.   Eyes:      Extraocular Movements: Extraocular movements intact.      Pupils: Pupils are equal, round, and reactive to light.   Neck:      Thyroid: No thyroid mass or thyromegaly.      Trachea: Trachea normal.   Cardiovascular:      Rate and Rhythm: Normal rate and regular rhythm.      Pulses: Normal pulses. No decreased pulses.      Heart sounds: Normal heart sounds.   Pulmonary:      Effort: Pulmonary effort is normal.      Breath sounds: Normal breath sounds.   Abdominal:      General: Abdomen is flat. Bowel sounds are normal.      Palpations: Abdomen is soft.      Tenderness: There is no abdominal tenderness.   Musculoskeletal:      Cervical back: Neck supple.      Right lower leg: No edema.      Left lower leg: No edema.   Lymphadenopathy:      Cervical: No cervical adenopathy.   Skin:     General: Skin is warm and dry.   Neurological:      General: No focal deficit present.      Mental Status: He is alert and oriented to person, place, and time.      Sensory: Sensation is intact.      Motor: Motor function is intact.      Coordination: Coordination is intact.   Psychiatric:         Attention and Perception: Attention normal.         Mood and Affect: Mood normal.         Speech: Speech normal.         Behavior: Behavior normal.         Procedures    Results:   Labs:   Hemoglobin A1C   Date Value Ref Range Status   12/09/2024 6.8 (A) 4.5 - 5.7 % Final "   09/06/2024 7.10 (H) 4.80 - 5.60 % Final     TSH   Date Value Ref Range Status   09/06/2024 2.240 0.270 - 4.200 uIU/mL Final        POCT Results (if applicable):   Results for orders placed or performed in visit on 02/11/25   POC Rapid Strep A    Collection Time: 02/11/25 12:16 PM    Specimen: Swab   Result Value Ref Range    Rapid Strep A Screen Negative Negative, VALID, INVALID, Not Performed    Internal Control Passed Passed    Lot Number [id number]     Expiration Date 1,032,027    Covid-19 + Flu A&B AG, Veritor    Collection Time: 02/11/25 12:17 PM    Specimen: Swab   Result Value Ref Range    SARS Antigen Not Detected Not Detected, Presumptive Negative    Influenza A Antigen GAYE Not Detected Not Detected    Influenza B Antigen GAYE Not Detected Not Detected    Internal Control Passed Passed    Lot Number [id number]     Expiration Date 10,232,025        Imaging:   No valid procedures specified.     Measures:   Advanced Care Planning:   Patient does not have an advance directive, information provided.    Smoking Cessation:   Non-smoker.    Assessment / Plan      Assessment/Plan:   Diagnoses and all orders for this visit:    1. Fever, unspecified fever cause (Primary)  Patient was tested for flu, COVID as well as a rapid strep test.  All were negative.  -     Covid-19 + Flu A&B AG, Veritor  -     POC Rapid Strep A    2. Acute non-recurrent pansinusitis  Patient developed symptoms a week ago that has now evolved into what appears to be thick mucus drainage as well as symptoms secondary to an acute sinusitis.  We have discussed options and since he is allergic to penicillin we will proceed with treatment of doxycycline.  We will also do steroids as he is having significant amount of eustachian pressure as well as sinus pressure.  He will continue push fluids and use nasal saline and if he has other problems or plans further assessment treatment will be necessary.  -     doxycycline (VIBRAMYCIN) 100 MG capsule; Take 1  capsule by mouth 2 (Two) Times a Day.  Dispense: 20 capsule; Refill: 0  -     predniSONE (DELTASONE) 10 MG tablet; Take 8 tablets by mouth Daily for 1 day, THEN 7 tablets Daily for 1 day, THEN 6 tablets Daily for 1 day, THEN 5 tablets Daily for 1 day, THEN 4 tablets Daily for 1 day, THEN 3 tablets Daily for 1 day, THEN 2 tablets Daily for 1 day, THEN 1 tablet Daily for 1 day.  Dispense: 36 tablet; Refill: 0        Follow Up:   Return in about 27 days (around 3/10/2025).      At [facility], we believe that sharing information builds trust and better relationships. You are receiving this note because you recently visited [facility]. It is possible you will see health information before a provider has talked with you about it. This kind of information can be easy to misunderstand. To help you fully understand what it means for your health, we urge you to discuss this note with your provider.    Rigoberto Brand MD  [facility]    Patient or patient representative verbalized consent for the use of Ambient Listening during the visit with  Rigoberto Brand MD for chart documentation. 2/11/2025  12:19 EST

## 2025-02-15 ENCOUNTER — PATIENT MESSAGE (OUTPATIENT)
Dept: FAMILY MEDICINE CLINIC | Facility: CLINIC | Age: 45
End: 2025-02-15
Payer: COMMERCIAL

## 2025-03-10 ENCOUNTER — OFFICE VISIT (OUTPATIENT)
Age: 45
End: 2025-03-10
Payer: COMMERCIAL

## 2025-03-10 ENCOUNTER — OFFICE VISIT (OUTPATIENT)
Dept: FAMILY MEDICINE CLINIC | Facility: CLINIC | Age: 45
End: 2025-03-10
Payer: COMMERCIAL

## 2025-03-10 VITALS
TEMPERATURE: 97.2 F | WEIGHT: 315 LBS | HEIGHT: 78 IN | SYSTOLIC BLOOD PRESSURE: 138 MMHG | RESPIRATION RATE: 16 BRPM | HEART RATE: 61 BPM | DIASTOLIC BLOOD PRESSURE: 80 MMHG | OXYGEN SATURATION: 97 % | BODY MASS INDEX: 36.45 KG/M2

## 2025-03-10 VITALS
OXYGEN SATURATION: 97 % | WEIGHT: 315 LBS | SYSTOLIC BLOOD PRESSURE: 148 MMHG | HEART RATE: 60 BPM | DIASTOLIC BLOOD PRESSURE: 87 MMHG | HEIGHT: 78 IN | BODY MASS INDEX: 36.45 KG/M2

## 2025-03-10 DIAGNOSIS — F32.A ANXIETY AND DEPRESSION: ICD-10-CM

## 2025-03-10 DIAGNOSIS — F41.9 ANXIETY AND DEPRESSION: ICD-10-CM

## 2025-03-10 DIAGNOSIS — R00.2 PALPITATIONS: ICD-10-CM

## 2025-03-10 DIAGNOSIS — K75.81 METABOLIC DYSFUNCTION-ASSOCIATED STEATOHEPATITIS (MASH): ICD-10-CM

## 2025-03-10 DIAGNOSIS — F43.9 TRAUMA AND STRESSOR-RELATED DISORDER: ICD-10-CM

## 2025-03-10 DIAGNOSIS — E78.2 MIXED HYPERLIPIDEMIA: ICD-10-CM

## 2025-03-10 DIAGNOSIS — F33.0 MILD EPISODE OF RECURRENT MAJOR DEPRESSIVE DISORDER: Primary | ICD-10-CM

## 2025-03-10 DIAGNOSIS — K21.9 CHRONIC GERD: ICD-10-CM

## 2025-03-10 DIAGNOSIS — F41.8 SITUATIONAL ANXIETY: ICD-10-CM

## 2025-03-10 DIAGNOSIS — E11.9 TYPE 2 DIABETES MELLITUS WITHOUT COMPLICATION, WITHOUT LONG-TERM CURRENT USE OF INSULIN: Primary | ICD-10-CM

## 2025-03-10 DIAGNOSIS — I10 PRIMARY HYPERTENSION: ICD-10-CM

## 2025-03-10 DIAGNOSIS — Z91.89 AT INCREASED RISK FOR CARDIOVASCULAR DISEASE: ICD-10-CM

## 2025-03-10 LAB
EXPIRATION DATE: ABNORMAL
EXPIRATION DATE: NORMAL
HBA1C MFR BLD: 7.6 % (ref 4.5–5.7)
Lab: ABNORMAL
Lab: NORMAL
POC ALBUMIN, URINE: 10 MG/L
POC CREATININE, URINE: 100 MG/DL
POC URINE ALB/CREA RATIO: <30

## 2025-03-10 RX ORDER — ASPIRIN 81 MG/1
81 TABLET ORAL DAILY
Start: 2025-03-10

## 2025-03-10 RX ORDER — DEXTROMETHORPHAN HYDROBROMIDE, BUPROPION HYDROCHLORIDE 105; 45 MG/1; MG/1
1 TABLET, MULTILAYER, EXTENDED RELEASE ORAL 2 TIMES DAILY
Qty: 60 TABLET | Refills: 2 | Status: SHIPPED | OUTPATIENT
Start: 2025-03-10

## 2025-03-10 RX ORDER — VILAZODONE HYDROCHLORIDE 10 MG/1
10 TABLET ORAL DAILY
Qty: 30 TABLET | Refills: 1 | Status: SHIPPED | OUTPATIENT
Start: 2025-03-10

## 2025-03-10 RX ORDER — PROPRANOLOL HCL 20 MG
TABLET ORAL
Qty: 90 TABLET | Refills: 1 | Status: SHIPPED | OUTPATIENT
Start: 2025-03-10

## 2025-03-10 NOTE — PROGRESS NOTES
Follow Up Office Visit      Date: 03/10/2025   Patient Name: Dom Russell  : 1980   MRN: [id number]     Chief Complaint:    Chief Complaint   Patient presents with    Follow-up     3 MONTH    Diabetes    Hypertension    Hyperlipidemia    Anxiety    Sinusitis       History of Present Illness: Dom Russell is a 44 y.o. male who is here today for follow-up.    History of Present Illness  The patient is a 44-year-old male who presents for evaluation of diabetes, blood pressure management, atrial fibrillation, and medication management.    He reports that his current medications are effective in managing his conditions. He continues to take amlodipine, benazepril, metformin, metoprolol, propranolol, Ozempic 2 mg, Crestor, omeprazole, fenofibrate, Lexapro, and dextromethorphan bupropion. He was previously on a daily regimen of baby aspirin 81 mg, but this was discontinued a few years ago following his weight loss surgery. He is uncertain about the reason for discontinuation. He has exhausted his supply of Ozempic and requires a refill. He has been without it for a week due to personal circumstances involving his mother's health.    He has been experiencing episodes of atrial fibrillation, as indicated by his watch, which reports that his heart was in atrial fibrillation 2 percent of the time over the past week. He also reports a lack of energy and difficulty recovering from sleep deprivation, which he attributes to his age. He recalls an incident where he was extremely angry, resulting in shaking and chest tightness, which he believes was due to stress.    Supplemental Information  He had his eyes checked about 6 months ago and was told his vision is still 20/20. He wore glasses when he was younger, around fourth grade, but his vision corrected itself over time.    FAMILY HISTORY  His mother is experiencing cognitive issues, possibly related to dementia or antibiotics taken before surgery.  She had surgery to remove an infected bone from her heel due to diabetes.    MEDICATIONS  Current: Amlodipine, benazepril, metformin, metoprolol, propranolol, Ozempic, Crestor, omeprazole, fenofibrate, Lexapro, dextromethorphan bupropion.  Discontinued: Baby aspirin.     Patient appears to be doing otherwise well.  They have continue with their medications without any side effects.  They have not had any changes in their usual activity, appetite and sleep.  Patient denies any other cardiovascular, respiratory, gastrointestinal, urologic or neurologic complaints.    Subjective      Review of Systems:   Review of Systems   Constitutional:  Negative for activity change, appetite change and fatigue.   Respiratory:  Negative for cough, chest tightness, shortness of breath and wheezing.    Cardiovascular:  Negative for chest pain, palpitations and leg swelling.   Gastrointestinal:  Negative for abdominal distention, abdominal pain, blood in stool, constipation, diarrhea, nausea, vomiting, GERD and indigestion.   Genitourinary:  Negative for difficulty urinating, dysuria, flank pain, frequency, hematuria and urgency.   Musculoskeletal:  Negative for arthralgias, back pain, gait problem, joint swelling and myalgias.   Neurological:  Negative for dizziness, tremors, seizures, syncope, weakness, light-headedness, numbness, headache and memory problem.   Psychiatric/Behavioral:  Negative for sleep disturbance and depressed mood. The patient is not nervous/anxious.        I have reviewed the patients family history, social history, past medical history, past surgical history and have updated it as appropriate.     Medications:     Current Outpatient Medications:     amLODIPine (NORVASC) 10 MG tablet, Take 1 tablet by mouth Daily., Disp: 90 tablet, Rfl: 3    benazepril (LOTENSIN) 20 MG tablet, Take 1 tablet by mouth daily, Disp: 90 tablet, Rfl: 3    busPIRone (BUSPAR) 5 MG tablet, Take 1 tablet by mouth 3 (Three) Times a Day.,  Disp: 90 tablet, Rfl: 1    cetirizine (ZyrTEC) 10 MG chewable tablet, 1 tablet., Disp: , Rfl:     Continuous Glucose Sensor (FreeStyle Wen 3 Sensor) misc, Use 1 each Every 14 (Fourteen) Days., Disp: 2 each, Rfl: 11    Dextromethorphan-buPROPion ER (Auvelity)  MG tablet controlled-release, Take 1 tablet by mouth 2 (Two) Times a Day., Disp: 60 tablet, Rfl: 2    escitalopram (Lexapro) 20 MG tablet, Take 2 tablets by mouth Daily., Disp: 60 tablet, Rfl: 2    fenofibrate micronized (LOFIBRA) 134 MG capsule, 1 capsule., Disp: , Rfl:     metFORMIN ER (GLUCOPHAGE-XR) 500 MG 24 hr tablet, Take 2 tablets by mouth 2 (Two) Times a Day., Disp: 360 tablet, Rfl: 3    omeprazole (priLOSEC) 40 MG capsule, Take 1 capsule by mouth Daily., Disp: 90 capsule, Rfl: 1    propranolol (INDERAL) 20 MG tablet, Take 1/2 to 1 tablet (10 to 20 mg) up to three time daily as needed for anxiety., Disp: 90 tablet, Rfl: 0    rosuvastatin (CRESTOR) 20 MG tablet, Take 1 tablet by mouth Daily., Disp: 90 tablet, Rfl: 1    aspirin 81 MG EC tablet, Take 1 tablet by mouth Daily., Disp: , Rfl:     Tirzepatide 10 MG/0.5ML solution auto-injector, Inject 10 mg under the skin into the appropriate area as directed 1 (One) Time Per Week., Disp: 2 mL, Rfl: 1    Allergies:   Allergies   Allergen Reactions    Amoxicillin Hives    Ceftazidime Hives    Cephalosporins Hives and Shortness Of Breath    Clarithromycin Hives and Anaphylaxis    Codeine Hives    Penicillins Hives and Seizure    Sulbactam Hives    Unasyn [Ampicillin-Sulbactam Sodium] Hives       Immunizations:   Immunization History   Administered Date(s) Administered    Flu Vaccine Quad PF >36MO 10/18/2021, 10/03/2022    Fluzone  >6mos 12/09/2024    Fluzone (or Fluarix & Flulaval for VFC) >6mos 10/18/2021, 10/03/2022    Fluzone High-Dose 65+YRS 10/30/2017    Fluzone Quad >6mos (Multi-dose) 11/30/2018, 10/01/2019    Hepatitis A 11/01/2018, 11/30/2018, 06/21/2019    Hepatitis B Adult/Adolescent IM  "02/14/2006, 06/27/2006, 08/29/2006, 08/31/2020, 11/09/2020    Influenza, Unspecified 12/28/2023    Pneumococcal Conjugate 20-Valent (PCV20) 03/07/2023    Pneumococcal Polysaccharide (PPSV23) 12/20/2011, 06/06/2019    Td (TDVAX) 08/14/1997    Tdap 12/07/2022        Objective     Physical Exam: Please see above  Vital Signs:   Vitals:    03/10/25 1546   BP: 138/80   BP Location: Left arm   Patient Position: Sitting   Cuff Size: Large Adult   Pulse: 61   Resp: 16   Temp: 97.2 °F (36.2 °C)   TempSrc: Temporal   SpO2: 97%   Weight: (!) 160 kg (353 lb 12.8 oz)   Height: 198.1 cm (78\")     Body mass index is 40.89 kg/m².          Physical Exam  Vitals and nursing note reviewed.   Constitutional:       Appearance: Normal appearance.   HENT:      Head: Normocephalic and atraumatic.      Nose: Nose normal.      Mouth/Throat:      Pharynx: Oropharynx is clear.   Eyes:      Extraocular Movements: Extraocular movements intact.      Pupils: Pupils are equal, round, and reactive to light.   Neck:      Thyroid: No thyroid mass or thyromegaly.      Trachea: Trachea normal.   Cardiovascular:      Rate and Rhythm: Normal rate and regular rhythm.      Pulses: Normal pulses. No decreased pulses.      Heart sounds: Normal heart sounds.   Pulmonary:      Effort: Pulmonary effort is normal.      Breath sounds: Normal breath sounds.   Abdominal:      General: Abdomen is flat. Bowel sounds are normal.      Palpations: Abdomen is soft.      Tenderness: There is no abdominal tenderness.   Musculoskeletal:      Cervical back: Neck supple.      Right lower leg: No edema.      Left lower leg: No edema.   Lymphadenopathy:      Cervical: No cervical adenopathy.   Skin:     General: Skin is warm and dry.   Neurological:      General: No focal deficit present.      Mental Status: He is alert and oriented to person, place, and time.      Sensory: Sensation is intact.      Motor: Motor function is intact.      Coordination: Coordination is intact. "   Psychiatric:         Attention and Perception: Attention normal.         Mood and Affect: Mood normal.         Speech: Speech normal.         Behavior: Behavior normal.         Procedures    Results:   Labs:   Hemoglobin A1C   Date Value Ref Range Status   12/09/2024 6.8 (A) 4.5 - 5.7 % Final   09/06/2024 7.10 (H) 4.80 - 5.60 % Final     TSH   Date Value Ref Range Status   09/06/2024 2.240 0.270 - 4.200 uIU/mL Final            Imaging:   No valid procedures specified.     Measures:   Advanced Care Planning:   Patient does not have an advance directive, information provided.    Smoking Cessation:   Non-smokers.    Assessment / Plan      Assessment/Plan:   Diagnoses and all orders for this visit:    1. Type 2 diabetes mellitus without complication, without long-term current use of insulin (Primary)  Patient does appear to be doing relatively well with respect to their diabetes.  They are tolerating their medications without complaints.  We will continue to follow their hemoglobin A1c and will make adjustments to keep their level less than 7%.  Patient will be switched over to Mounjaro.  He will also restart the metformin instead of 2 tablets daily he will increase to 2 tablets twice daily.  -     POC Glycosylated Hemoglobin (Hb A1C)  -     POC Albumin/Creatinine Ratio Urine  -     Tirzepatide 10 MG/0.5ML solution auto-injector; Inject 10 mg under the skin into the appropriate area as directed 1 (One) Time Per Week.  Dispense: 2 mL; Refill: 1    2. Primary hypertension   Patient appears to be tolerating their blood pressure medicine without any side effects.  We will continue to monitor their blood pressure and will adjust to keep there is systolic blood pressure less than 130.  We will continue to monitor renal function and will make adjustments based on these findings.    3. Metabolic dysfunction-associated steatohepatitis (MASH)   Patient does appear to have metabolic dysfunction of your liver secondary to fatty  liver disease.  They understands the importance of a low carbohydrate/low-fat diet as well as 300 minutes of aerobic exercise weekly.  We will continue to monitor and will obtain laboratory data intermittently to assure us there is no progression of the disease.  A liver ultrasound as well elastography may be consider intermittently.  Patient understands the importance of weight loss.  There may be an indication of use of a GLP-1 agonist.    4. Mixed hyperlipidemia   Patient does appear to be tolerating their lipid-lowering agent without difficulty.  We will obtain the lipid profile as well as liver function test to observe for any abnormalities.  We will continue to adjust medications to keep their LDL less than 70.    5. Anxiety and depression   Patient appears to be doing well at present time with respect to their anxiety.  They are tolerating their medications and other treatment plans without difficulty.  We will continue with current regimen and if they have any other problems or complaints prior to her next scheduled follow-up they will contact us.  Adjustments of medications or referral to a behavioral health specialist may be necessary in the future.    6. Chronic GERD   Patient is doing well at present time with respect to the reflux symptomatology.  They are tolerating their medications without any complaints at present time.  We will continue to monitor their symptoms and if they develop dysphagia, alarm symptoms or worsening symptomatology further evaluation and treatment may be necessary as well as possible referral for an EGD.    7. At increased risk for cardiovascular disease  -     aspirin 81 MG EC tablet; Take 1 tablet by mouth Daily.        Follow Up:   Return in about 3 months (around 6/10/2025).      At [facility], we believe that sharing information builds trust and better relationships. You are receiving this note because you recently visited [facility]. It is possible you will see  health information before a provider has talked with you about it. This kind of information can be easy to misunderstand. To help you fully understand what it means for your health, we urge you to discuss this note with your provider.    Rigoberto Brand MD  [facility]    Patient or patient representative verbalized consent for the use of Ambient Listening during the visit with  Rigoberto Brand MD for chart documentation. 3/10/2025  16:11 EDT

## 2025-03-10 NOTE — PROGRESS NOTES
Follow Up Office Visit      Date: 03/10/2025   Patient Name: Dom Russell  : 1980   MRN: [id number]     Patient or patient representative verbalized consent for the use of Ambient Listening during the visit with  MELL Mckeon for chart documentation. 3/11/2025  16:44 EDT    Chief Complaint:  Dom Russell is a 44 y.o. male who is here today for follow up with medication management for anxiety and depression.    History of Present Illness  He reports initial improvement with his medication regimen; however, he has recently experienced a resurgence of symptoms. He describes difficulty initiating sleep, heightened anxiety, and increased irritability. He also reports frequent forgetfulness in taking his medications. He does not endorse any suicidal or homicidal ideation. He acknowledges the need for treatment of his PTSD symptoms. His current medications include buspirone, administered once daily in the morning, and propranolol, taken as needed. He has been on Lexapro for approximately 2 years, prescribed by Dr. Pitts.    He is on metoprolol and has not felt any ill effects from it. His blood pressure today was 140 systolic even with both metoprolol and propranolol.    MEDICATIONS  Current: Buspirone, propranolol, metoprolol, Emgality, Lexapro    Subjective     Review of Systems:   Review of Systems   Psychiatric/Behavioral:  Positive for dysphoric mood, depressed mood and stress.        Screening Scores:   PHQ-9: 13 (last visit, 13)  WON-7: 12 (last visit, 13)    Medications:     Current Outpatient Medications:     amLODIPine (NORVASC) 10 MG tablet, Take 1 tablet by mouth Daily., Disp: 90 tablet, Rfl: 3    aspirin 81 MG EC tablet, Take 1 tablet by mouth Daily., Disp: , Rfl:     benazepril (LOTENSIN) 20 MG tablet, Take 1 tablet by mouth daily, Disp: 90 tablet, Rfl: 3    busPIRone (BUSPAR) 5 MG tablet, Take 1 tablet by mouth 3 (Three) Times a Day., Disp: 90 tablet, Rfl: 1     "cetirizine (ZyrTEC) 10 MG chewable tablet, 1 tablet., Disp: , Rfl:     Continuous Glucose Sensor (FreeStyle Wen 3 Sensor) misc, Use 1 each Every 14 (Fourteen) Days., Disp: 2 each, Rfl: 11    Dextromethorphan-buPROPion ER (Auvelity)  MG tablet controlled-release, Take 1 tablet by mouth 2 (Two) Times a Day., Disp: 60 tablet, Rfl: 2    fenofibrate micronized (LOFIBRA) 134 MG capsule, 1 capsule., Disp: , Rfl:     metFORMIN ER (GLUCOPHAGE-XR) 500 MG 24 hr tablet, Take 2 tablets by mouth 2 (Two) Times a Day., Disp: 360 tablet, Rfl: 3    omeprazole (priLOSEC) 40 MG capsule, Take 1 capsule by mouth Daily., Disp: 90 capsule, Rfl: 1    propranolol (INDERAL) 20 MG tablet, Take 1/2 to 1 tablet (10 to 20 mg) up to three time daily as needed for anxiety., Disp: 90 tablet, Rfl: 1    rosuvastatin (CRESTOR) 20 MG tablet, Take 1 tablet by mouth Daily., Disp: 90 tablet, Rfl: 1    Tirzepatide 10 MG/0.5ML solution auto-injector, Inject 10 mg under the skin into the appropriate area as directed 1 (One) Time Per Week., Disp: 2 mL, Rfl: 1    vilazodone (VIIBRYD) 10 MG tablet tablet, Take 1 tablet by mouth Daily., Disp: 30 tablet, Rfl: 1    Allergies:   Allergies   Allergen Reactions    Amoxicillin Hives    Ceftazidime Hives    Cephalosporins Hives and Shortness Of Breath    Clarithromycin Hives and Anaphylaxis    Codeine Hives    Penicillins Hives and Seizure    Sulbactam Hives    Unasyn [Ampicillin-Sulbactam Sodium] Hives       Results Reviewed: n/a     The following portion of the patient's history were reviewed and updated appropriately: allergies, current and past medications, family history, medical history and social history.    Objective     Vital Signs:   Vitals:    03/10/25 1635   BP: 148/87   Pulse: 60   SpO2: 97%   Weight: (!) 159 kg (350 lb 8 oz)   Height: 198.1 cm (78\")     Body mass index is 40.5 kg/m².     Mental Status Exam:   MENTAL STATUS EXAM   General Appearance:  Cleanly groomed and dressed  Eye Contact:  Good " eye contact  Attitude:  Cooperative  Motor Activity:  Normal gait, posture  Muscle Strength:  Normal  Speech:  Normal rate, tone, volume  Language:  Spontaneous  Mood and affect:  Normal, pleasant  Hopelessness:  Denies  Loneliness: Denies  Thought Process:  Logical  Associations/ Thought Content:  No delusions  Hallucinations:  None  Suicidal Ideations:  Not present  Homicidal Ideation:  Not present  Sensorium:  Alert and clear  Orientation:  Person, place, time and situation  Immediate Recall, Recent, and Remote Memory:  Intact  Attention Span/ Concentration:  Good  Fund of Knowledge:  Appropriate for age and educational level  Intellectual Functioning:  Average range  Insight:  Good  Judgement:  Good  Reliability:  Good  Impulse Control:  Good        SUICIDE RISK ASSESSMENT/CSSRS:  1. Does patient have thoughts of suicide? no  2. Does patient have intent for suicide? no  3. Does patient have a current plan for suicide? no  4. History of suicide attempts: no  5. Family history of suicide or attempts: no  6. History of violent behaviors towards others or property or thoughts of committing suicide: no  7. History of sexual aggression toward others: no  8. Access to firearms or weapons: no    Labs Reviewed: n/a  UDS Reviewed: n/a  Chart since last visit reviewed: yes    Assessment / Plan    Quality Measures:  Tobacco cessation: Patient is a former tobacco user. No tobacco cessation education necessary.     Depression (PHQ >9): Patient screened positive for depression with a PHQ score of 13. Follow up recommendations include medication management, suicide risk assessment, continued screening score monitoring and supportive care.     Medication Considerations:  Benzo: N/A  Stimulants: N/A  ROBSON reviewed and appropriate.     Risk Assessment: Risk of self-harm acutely is low. Risk of self-harm chronically is also low, but could be further elevated in the event of treatment noncompliance and/or AODA.    Visit  Diagnosis/Orders Placed This Visit:  Diagnoses and all orders for this visit:    1. Mild episode of recurrent major depressive disorder (Primary)  -     vilazodone (VIIBRYD) 10 MG tablet tablet; Take 1 tablet by mouth Daily.  Dispense: 30 tablet; Refill: 1  -     Dextromethorphan-buPROPion ER (Auvelity)  MG tablet controlled-release; Take 1 tablet by mouth 2 (Two) Times a Day.  Dispense: 60 tablet; Refill: 2    2. Situational anxiety  -     propranolol (INDERAL) 20 MG tablet; Take 1/2 to 1 tablet (10 to 20 mg) up to three time daily as needed for anxiety.  Dispense: 90 tablet; Refill: 1    3. Trauma and stressor-related disorder         Treatment Plan:  Assessment & Plan  Depression.  He reports increased anxiety and irritability, along with difficulty sleeping. His PHQ score is elevated. He is advised to continue his current dosage of BuSpar until the transition to Viibryd is complete, after which he may adjust the BuSpar dosage as needed. A prescription for Viibryd 10 mg daily will be initiated, with a potential increase to 20 mg after one month if necessary. He is instructed to halve his remaining Lexapro tablets and consume them until depleted, facilitating a gradual discontinuation while the Viibryd takes effect. If irritability and anger persist, the addition of Lamictal may be considered.    Anxiety.  A refill for propranolol 10 to 20 mg up to three times daily as needed, will be provided, with the instruction to avoid concurrent administration with metoprolol.     Trauma and stressor-related disorder.   He is encouraged to seek therapy for his PTSD symptoms.    Follow-up  The patient will follow up in 6 weeks.      Any medications prescribed have been sent electronically to [facility] in [place].    Patient will continue supportive psychotherapy efforts and medications as indicated.  Discussed medication options and treatment plan of prescribed medication(s) as well as the risks,  benefits, and potential side effects. Patient will contact this office, call 911 or present to the nearest emergency room should suicidal or homicidal ideations occur. Clinic will obtain release of information for current treatment team for continuity of care as needed. Patient ackowledged and verbally consented to continue with current treatment plan and was educated on the importance of compliance with treatment and follow-up appointments.           MELL Mascorro  [facility] [place] [facility]    This is electronically signed by MELL Mascorro  03/10/2025 08:31 EDT

## 2025-03-19 ENCOUNTER — OFFICE VISIT (OUTPATIENT)
Dept: FAMILY MEDICINE CLINIC | Facility: CLINIC | Age: 45
End: 2025-03-19
Payer: COMMERCIAL

## 2025-03-19 ENCOUNTER — TELEPHONE (OUTPATIENT)
Dept: FAMILY MEDICINE CLINIC | Facility: CLINIC | Age: 45
End: 2025-03-19

## 2025-03-19 VITALS
HEART RATE: 79 BPM | DIASTOLIC BLOOD PRESSURE: 90 MMHG | BODY MASS INDEX: 36.45 KG/M2 | TEMPERATURE: 97.8 F | OXYGEN SATURATION: 98 % | RESPIRATION RATE: 16 BRPM | HEIGHT: 78 IN | SYSTOLIC BLOOD PRESSURE: 138 MMHG | WEIGHT: 315 LBS

## 2025-03-19 DIAGNOSIS — U07.1 COVID: Primary | ICD-10-CM

## 2025-03-19 DIAGNOSIS — R68.89 FLU-LIKE SYMPTOMS: ICD-10-CM

## 2025-03-19 DIAGNOSIS — J01.90 ACUTE SINUSITIS, RECURRENCE NOT SPECIFIED, UNSPECIFIED LOCATION: ICD-10-CM

## 2025-03-19 LAB
EXPIRATION DATE: ABNORMAL
FLUAV AG UPPER RESP QL IA.RAPID: NOT DETECTED
FLUBV AG UPPER RESP QL IA.RAPID: NOT DETECTED
INTERNAL CONTROL: ABNORMAL
Lab: ABNORMAL
SARS-COV-2 AG UPPER RESP QL IA.RAPID: DETECTED

## 2025-03-19 PROCEDURE — 87428 SARSCOV & INF VIR A&B AG IA: CPT | Performed by: NURSE PRACTITIONER

## 2025-03-19 PROCEDURE — 99213 OFFICE O/P EST LOW 20 MIN: CPT | Performed by: NURSE PRACTITIONER

## 2025-03-19 RX ORDER — SULFAMETHOXAZOLE AND TRIMETHOPRIM 800; 160 MG/1; MG/1
1 TABLET ORAL 2 TIMES DAILY
Qty: 20 TABLET | Refills: 0 | Status: SHIPPED | OUTPATIENT
Start: 2025-03-19

## 2025-03-19 NOTE — TELEPHONE ENCOUNTER
[facility] STATED THAT THEY DID NOT RECEIVE PT'S MEDICATION. PLEASE RESEND Rx TO [facility] IN [place].

## 2025-03-19 NOTE — PROGRESS NOTES
Office Note     Name: Dom Russell    : 1980     MRN: [id number]     Chief Complaint  Nasal Congestion, Headache, Fatigue, and URI    Subjective     History of Present Illness:  Dom Russell is a 44 y.o. male who presents today for sinus symptoms. He completed a course of doxycycline in 2025 but continues to have sinus drainage and congestion. He reports his sinus symptoms improved but did not resolve and have worsened again. He noticed bloody nasal drainage when blowing his nose this morning. He reports increased pressure around his eyes. He has fatigue, head congestion, postnasal drip, runny nose, and headache.   History of Present Illness      Review of Systems:   Review of Systems   Constitutional:  Positive for fatigue. Negative for appetite change, chills, diaphoresis and fever.   HENT:  Positive for congestion, nosebleeds, postnasal drip, rhinorrhea and sinus pressure. Negative for ear pain and sore throat.    Eyes:  Negative for discharge and itching.   Respiratory:  Negative for cough, shortness of breath and wheezing.    Gastrointestinal:  Negative for diarrhea, nausea and vomiting.   Musculoskeletal:  Negative for myalgias.   Allergic/Immunologic: Positive for environmental allergies. Negative for food allergies and immunocompromised state.   Neurological:  Positive for headache.       Past Medical History:   Past Medical History:   Diagnosis Date    Acute bronchitis     Adult-onset obesity     Agitated     Allergic rhinitis     Anxiety and depression     Arthritis     Asthma     Brain concussion     Cellulitis     Chronic GERD     Cluster headache     Diabetes mellitus     Diarrhea     Dyspnea     Erectile dysfunction     Exposure to influenza     GERD (gastroesophageal reflux disease)     Gonadal dysgenesis     Headache     Hemorrhoid     HIV disease     HL (hearing loss)     Hyperlipidemia     Hypersomnia with sleep apnea     Hypertension, benign     Kidney  stone 2023    [facility]    Leg skin lesion, right     Low back pain 2015    Lumbosacral disc disease 2021    Memory loss     Migraine February 2022    Mixed dyslipidemia     Morbid obesity     MRSA (methicillin resistant Staphylococcus aureus) 2021    Neuropathy in diabetes     Pain in joint involving shoulder region     Pain in left lower leg     Pain of left shoulder region     Panniculitis     Peripheral neuropathy 2019    Pneumococcal vaccination declined     Pneumonia     Primary hypertension     Scabies     Sinusitis, acute     Sleep apnea 2011    Spindle cell type atypical fibroxanthoma     Sprain of ankle, sequela     Urinary tract infection 02-17-23       Past Surgical History:   Past Surgical History:   Procedure Laterality Date    EAR TUBES      GALLBLADDER SURGERY  05/17/2024    GASTRIC SLEEVE LAPAROSCOPIC      HIP SURGERY Right     HIP SLIPPED EPHISLEA    SINUS SURGERY      TONSILLECTOMY         Immunizations:   Immunization History   Administered Date(s) Administered    Flu Vaccine Quad PF >36MO 10/18/2021, 10/03/2022    Fluzone  >6mos 12/09/2024    Fluzone (or Fluarix & Flulaval for VFC) >6mos 10/18/2021, 10/03/2022    Fluzone High-Dose 65+YRS 10/30/2017    Fluzone Quad >6mos (Multi-dose) 11/30/2018, 10/01/2019    Hepatitis A 11/01/2018, 11/30/2018, 06/21/2019    Hepatitis B Adult/Adolescent IM 02/14/2006, 06/27/2006, 08/29/2006, 08/31/2020, 11/09/2020    Influenza, Unspecified 12/28/2023    Pneumococcal Conjugate 20-Valent (PCV20) 03/07/2023    Pneumococcal Polysaccharide (PPSV23) 12/20/2011, 06/06/2019    Td (TDVAX) 08/14/1997    Tdap 12/07/2022        Medications:     Current Outpatient Medications:     amLODIPine (NORVASC) 10 MG tablet, Take 1 tablet by mouth Daily., Disp: 90 tablet, Rfl: 3    aspirin 81 MG EC tablet, Take 1 tablet by mouth Daily., Disp: , Rfl:     benazepril (LOTENSIN) 20 MG tablet, Take 1 tablet by mouth daily, Disp: 90 tablet, Rfl: 3    busPIRone (BUSPAR) 5 MG tablet, Take 1  tablet by mouth 3 (Three) Times a Day., Disp: 90 tablet, Rfl: 1    cetirizine (ZyrTEC) 10 MG chewable tablet, 1 tablet., Disp: , Rfl:     Continuous Glucose Sensor (FreeStyle Wen 3 Sensor) misc, Use 1 each Every 14 (Fourteen) Days., Disp: 2 each, Rfl: 11    Dextromethorphan-buPROPion ER (Auvelity)  MG tablet controlled-release, Take 1 tablet by mouth 2 (Two) Times a Day., Disp: 60 tablet, Rfl: 2    fenofibrate micronized (LOFIBRA) 134 MG capsule, 1 capsule., Disp: , Rfl:     metFORMIN ER (GLUCOPHAGE-XR) 500 MG 24 hr tablet, Take 2 tablets by mouth 2 (Two) Times a Day., Disp: 360 tablet, Rfl: 3    omeprazole (priLOSEC) 40 MG capsule, Take 1 capsule by mouth Daily., Disp: 90 capsule, Rfl: 1    propranolol (INDERAL) 20 MG tablet, Take 1/2 to 1 tablet (10 to 20 mg) up to three time daily as needed for anxiety., Disp: 90 tablet, Rfl: 1    rosuvastatin (CRESTOR) 20 MG tablet, Take 1 tablet by mouth Daily., Disp: 90 tablet, Rfl: 1    sulfamethoxazole-trimethoprim (Bactrim DS) 800-160 MG per tablet, Take 1 tablet by mouth 2 (Two) Times a Day., Disp: 20 tablet, Rfl: 0    Tirzepatide 10 MG/0.5ML solution auto-injector, Inject 10 mg under the skin into the appropriate area as directed 1 (One) Time Per Week., Disp: 2 mL, Rfl: 1    vilazodone (VIIBRYD) 10 MG tablet tablet, Take 1 tablet by mouth Daily., Disp: 30 tablet, Rfl: 1    Allergies:   Allergies   Allergen Reactions    Amoxicillin Hives    Ceftazidime Hives    Cephalosporins Hives and Shortness Of Breath    Clarithromycin Hives and Anaphylaxis    Codeine Hives    Penicillins Hives and Seizure    Sulbactam Hives    Unasyn [Ampicillin-Sulbactam Sodium] Hives       Family History:   Family History   Problem Relation Age of Onset    Hypertension Mother     Hyperlipidemia Mother     Diabetes Mother     Kidney disease Mother     Liver disease Mother         Cirrhosis of the liver    Neuropathy Mother     Hypertension Father     Arthritis Father     Cancer Father          "Skin cancer    Hyperlipidemia Father     Bradycardia Sister     Breast cancer Other     Diabetes Other     Hypertension Other     Lung cancer Other     Heart attack Other     Skin cancer Other     Cancer Maternal Grandfather         Lung Cancer    Cancer Paternal Grandfather         Skin Cancer    Heart disease Paternal Grandfather         2 heart attacks 5 bypasses, congestive heart failure    Hyperlipidemia Paternal Grandfather     Cancer Maternal Aunt         Breast Cancer    Dementia Maternal Grandmother        Social History:   Social History     Socioeconomic History    Marital status:    Tobacco Use    Smoking status: Former     Current packs/day: 0.00     Average packs/day: 0.5 packs/day for 0.7 years (0.4 ttl pk-yrs)     Types: Cigarettes     Start date: 2022     Quit date: 2023     Years since quittin.6     Passive exposure: Past    Smokeless tobacco: Never   Vaping Use    Vaping status: Never Used   Substance and Sexual Activity    Alcohol use: Not Currently    Drug use: Never    Sexual activity: Yes     Partners: Female     Birth control/protection: None         Objective     Vital Signs  /90 (BP Location: Right arm, Patient Position: Sitting, Cuff Size: Large Adult)   Pulse 79   Temp 97.8 °F (36.6 °C) (Temporal)   Resp 16   Ht 198.1 cm (78\")   Wt (!) 158 kg (348 lb 3.2 oz)   SpO2 98%   BMI 40.24 kg/m²   Estimated body mass index is 40.24 kg/m² as calculated from the following:    Height as of this encounter: 198.1 cm (78\").    Weight as of this encounter: 158 kg (348 lb 3.2 oz).          Physical Exam  Vitals and nursing note reviewed.   Constitutional:       General: He is not in acute distress.     Appearance: Normal appearance. He is not ill-appearing or toxic-appearing.   HENT:      Head: Normocephalic and atraumatic.      Right Ear: Tympanic membrane, ear canal and external ear normal.      Left Ear: Tympanic membrane, ear canal and external ear normal.      Nose: " Nose normal.      Mouth/Throat:      Mouth: Mucous membranes are moist.      Pharynx: Oropharynx is clear.   Eyes:      General: No scleral icterus.        Right eye: No discharge.         Left eye: No discharge.      Conjunctiva/sclera: Conjunctivae normal.   Cardiovascular:      Rate and Rhythm: Normal rate and regular rhythm.      Heart sounds: Normal heart sounds.   Pulmonary:      Effort: Pulmonary effort is normal.      Breath sounds: Normal breath sounds.   Musculoskeletal:         General: Normal range of motion.      Cervical back: Normal range of motion and neck supple. No rigidity.   Lymphadenopathy:      Cervical: No cervical adenopathy.   Skin:     General: Skin is warm.   Neurological:      General: No focal deficit present.      Mental Status: He is alert.   Psychiatric:         Mood and Affect: Mood normal.         Behavior: Behavior normal.         Thought Content: Thought content normal.         Judgment: Judgment normal.          Assessment and Plan     Procedures      Lab Results (last 72 hours)       Procedure Component Value Units Date/Time    POCT SARS-CoV-2 + Flu Antigen GAYE [[id number]]  (Abnormal) Collected: 03/19/25 1614    Specimen: Swab Updated: 03/19/25 1615     SARS Antigen Detected     Influenza A Antigen GAYE Not Detected     Influenza B Antigen GAYE Not Detected     Internal Control Passed     Lot Number [id number]     Expiration Date 10/23/2025           Diagnoses and all orders for this visit:    1. COVID (Primary)  Comments:  Patient notified of positive COVID results. Discussed treatment options for COVID, patient declines. Patient will proceed with antibiotic therapy for sinusitis.    2. Flu-like symptoms  -     POCT SARS-CoV-2 + Flu Antigen GAYE    3. Acute sinusitis, recurrence not specified, unspecified location  -     sulfamethoxazole-trimethoprim (Bactrim DS) 800-160 MG per tablet; Take 1 tablet by mouth 2 (Two) Times a Day.  Dispense: 20 tablet; Refill: 0        Assessment &  Plan  Discussed comorbid conditions can increase risk of COVID complications including diabetes. Patient is instructed to go to the emergency room if symptoms progress.       Follow Up  Return if symptoms worsen or fail to improve.    Patient or patient representative verbalized consent for the use of Ambient Listening during the visit with  MELL Mccormack for chart documentation. 3/19/2025  17:46 EDT    MELL Mccormack [facility]  [address] [place] [address] [address] [place] [id number] [phone]

## 2025-03-19 NOTE — TELEPHONE ENCOUNTER
Called patient and notified him his antibiotic had been sent to [facility] in [place]. Offered to resend it to [facility] but patient declines, stating he will pick it up from [facility].

## 2025-04-16 ENCOUNTER — OFFICE VISIT (OUTPATIENT)
Dept: FAMILY MEDICINE CLINIC | Facility: CLINIC | Age: 45
End: 2025-04-16
Payer: COMMERCIAL

## 2025-04-16 ENCOUNTER — HOSPITAL ENCOUNTER (OUTPATIENT)
Dept: ULTRASOUND IMAGING | Facility: HOSPITAL | Age: 45
Discharge: HOME | End: 2025-04-16
Admitting: NURSE PRACTITIONER
Payer: COMMERCIAL

## 2025-04-16 ENCOUNTER — TELEPHONE (OUTPATIENT)
Dept: FAMILY MEDICINE CLINIC | Facility: CLINIC | Age: 45
End: 2025-04-16
Payer: COMMERCIAL

## 2025-04-16 VITALS
OXYGEN SATURATION: 98 % | SYSTOLIC BLOOD PRESSURE: 130 MMHG | TEMPERATURE: 97.8 F | WEIGHT: 315 LBS | BODY MASS INDEX: 36.45 KG/M2 | HEIGHT: 78 IN | DIASTOLIC BLOOD PRESSURE: 88 MMHG | RESPIRATION RATE: 16 BRPM | HEART RATE: 76 BPM

## 2025-04-16 DIAGNOSIS — S91.302A WOUND OF LEFT FOOT: ICD-10-CM

## 2025-04-16 DIAGNOSIS — R60.0 EDEMA OF LEFT LOWER EXTREMITY: ICD-10-CM

## 2025-04-16 DIAGNOSIS — L03.116 CELLULITIS OF LEFT LOWER EXTREMITY: Primary | ICD-10-CM

## 2025-04-16 DIAGNOSIS — E11.9 TYPE 2 DIABETES MELLITUS WITHOUT COMPLICATION, WITHOUT LONG-TERM CURRENT USE OF INSULIN: ICD-10-CM

## 2025-04-16 PROCEDURE — 93971 EXTREMITY STUDY: CPT

## 2025-04-16 RX ORDER — DOXYCYCLINE 100 MG/1
100 TABLET ORAL 2 TIMES DAILY
Qty: 20 TABLET | Refills: 0 | Status: SHIPPED | OUTPATIENT
Start: 2025-04-16 | End: 2025-04-21

## 2025-04-16 NOTE — TELEPHONE ENCOUNTER
CAN YOU SWITCH ZEPBOUND REFILL FROM [address] TO [facility]  CALL HIM TO LET HIM KNOW IT HAS BEEN DONE

## 2025-04-16 NOTE — PROGRESS NOTES
Office Note     Name: Dom Russell    : 1980     MRN: [id number]     Chief Complaint  Blister (Blister is gone and is mainly hole now has a rash going up ankle now. )    Subjective     History of Present Illness:  Dom Russell is a 44 y.o. male who presents today for two blisters on the bottom of his left foot. He reports walking 8 miles while in [facility] after attending a conference. He noticed blistering areas on the bottom of his foot at the second and third toe and his trip. He reports mild neuropathy in the foot and has been diagnosed with diabetes for ten years.   History of Present Illness  The patient presents for evaluation of foot blisters.    Blisters on the feet developed following an 8-mile walk in [facility] on 2025. The blisters were not noticed until his return home on 2025, at which point they had ruptured and exhibited redness. He also noticed redness in his ankle with mottled redness moving up into his calf along with mild swelling of the calf. A burning sensation in the arch of the foot and a feeling akin to a broken toe are reported. He has been diabetic for 10 years and he experiences some loss of sensation in the foot. A history of leg infections, including MRSA, is noted, but no recent outbreaks are reported. There is no history of deep vein thrombosis.     Sinus issues have persisted since a COVID-19 infection. Doxycycline was prescribed a few weeks prior to the COVID-19 diagnosis, and Bactrim was prescribed during the illness. Although some improvement is noted, significant coughing continues. A sensation of buildup in the back of the throat, which cannot be cleared, is described.    Review of Systems:   Review of Systems    Past Medical History:   Past Medical History:   Diagnosis Date    Acute bronchitis     Adult-onset obesity     Agitated     Allergic rhinitis     Anxiety and depression     Arthritis     Asthma     Brain concussion      Cellulitis     Chronic GERD     Cluster headache     Diabetes mellitus     Diarrhea     Dyspnea     Erectile dysfunction 2022    Exposure to influenza     GERD (gastroesophageal reflux disease)     Gonadal dysgenesis     Headache     Hemorrhoid     HIV disease     HL (hearing loss)     Hyperlipidemia     Hypersomnia with sleep apnea     Hypertension, benign     Kidney stone 2023    [place]     Leg skin lesion, right     Low back pain 2015    Lumbosacral disc disease 2021    Memory loss     Migraine February 2022    Mixed dyslipidemia     Morbid obesity     MRSA (methicillin resistant Staphylococcus aureus) 2021    Neuropathy in diabetes     Pain in joint involving shoulder region     Pain in left lower leg     Pain of left shoulder region     Panniculitis     Peripheral neuropathy 2019    Pneumococcal vaccination declined     Pneumonia     Primary hypertension     Scabies     Sinusitis, acute     Sleep apnea 2011    Spindle cell type atypical fibroxanthoma     Sprain of ankle, sequela     Urinary tract infection 02-17-23       Past Surgical History:   Past Surgical History:   Procedure Laterality Date    EAR TUBES      GALLBLADDER SURGERY  05/17/2024    GASTRIC SLEEVE LAPAROSCOPIC      HIP SURGERY Right     HIP SLIPPED EPHISLEA    SINUS SURGERY      TONSILLECTOMY         Immunizations:   Immunization History   Administered Date(s) Administered    Flu Vaccine Quad PF >36MO 10/18/2021, 10/03/2022    Fluzone  >6mos 12/09/2024    Fluzone (or Fluarix & Flulaval for VFC) >6mos 10/18/2021, 10/03/2022    Fluzone High-Dose 65+YRS 10/30/2017    Fluzone Quad >6mos (Multi-dose) 11/30/2018, 10/01/2019    Hepatitis A 11/01/2018, 11/30/2018, 06/21/2019    Hepatitis B Adult/Adolescent IM 02/14/2006, 06/27/2006, 08/29/2006, 08/31/2020, 11/09/2020    Influenza, Unspecified 12/28/2023    Pneumococcal Conjugate 20-Valent (PCV20) 03/07/2023    Pneumococcal Polysaccharide (PPSV23) 12/20/2011, 06/06/2019    Td (TDVAX) 08/14/1997     Tdap 12/07/2022        Medications:     Current Outpatient Medications:     Tirzepatide 10 MG/0.5ML solution auto-injector, Inject 10 mg under the skin into the appropriate area as directed 1 (One) Time Per Week., Disp: 2 mL, Rfl: 1    amLODIPine (NORVASC) 10 MG tablet, Take 1 tablet by mouth Daily., Disp: 90 tablet, Rfl: 3    aspirin 81 MG EC tablet, Take 1 tablet by mouth Daily., Disp: , Rfl:     benazepril (LOTENSIN) 20 MG tablet, Take 1 tablet by mouth daily, Disp: 90 tablet, Rfl: 3    busPIRone (BUSPAR) 5 MG tablet, Take 1 tablet by mouth 3 (Three) Times a Day., Disp: 90 tablet, Rfl: 1    cetirizine (ZyrTEC) 10 MG chewable tablet, 1 tablet., Disp: , Rfl:     Continuous Glucose Sensor (FreeStyle Wen 3 Sensor) misc, Use 1 each Every 14 (Fourteen) Days., Disp: 2 each, Rfl: 11    Dextromethorphan-buPROPion ER (Auvelity)  MG tablet controlled-release, Take 1 tablet by mouth 2 (Two) Times a Day., Disp: 60 tablet, Rfl: 2    doxycycline (ADOXA) 100 MG tablet, Take 1 tablet by mouth 2 (Two) Times a Day for 10 days., Disp: 20 tablet, Rfl: 0    fenofibrate micronized (LOFIBRA) 134 MG capsule, 1 capsule., Disp: , Rfl:     metFORMIN ER (GLUCOPHAGE-XR) 500 MG 24 hr tablet, Take 2 tablets by mouth 2 (Two) Times a Day., Disp: 360 tablet, Rfl: 3    omeprazole (priLOSEC) 40 MG capsule, Take 1 capsule by mouth Daily., Disp: 90 capsule, Rfl: 1    propranolol (INDERAL) 20 MG tablet, Take 1/2 to 1 tablet (10 to 20 mg) up to three time daily as needed for anxiety., Disp: 90 tablet, Rfl: 1    rosuvastatin (CRESTOR) 20 MG tablet, Take 1 tablet by mouth Daily., Disp: 90 tablet, Rfl: 1    sulfamethoxazole-trimethoprim (Bactrim DS) 800-160 MG per tablet, Take 1 tablet by mouth 2 (Two) Times a Day., Disp: 20 tablet, Rfl: 0    vilazodone (VIIBRYD) 10 MG tablet tablet, Take 1 tablet by mouth Daily., Disp: 30 tablet, Rfl: 1    Allergies:   Allergies   Allergen Reactions    Amoxicillin Hives    Ceftazidime Hives    Cephalosporins Hives  "and Shortness Of Breath    Clarithromycin Hives and Anaphylaxis    Codeine Hives    Penicillins Hives and Seizure    Sulbactam Hives    Unasyn [Ampicillin-Sulbactam Sodium] Hives       Family History:   Family History   Problem Relation Age of Onset    Hypertension Mother     Hyperlipidemia Mother     Diabetes Mother     Kidney disease Mother     Liver disease Mother         Cirrhosis of the liver    Neuropathy Mother     Hypertension Father     Arthritis Father     Cancer Father         Skin cancer    Hyperlipidemia Father     Bradycardia Sister     Breast cancer Other     Diabetes Other     Hypertension Other     Lung cancer Other     Heart attack Other     Skin cancer Other     Cancer Maternal Grandfather         Lung Cancer    Cancer Paternal Grandfather         Skin Cancer    Heart disease Paternal Grandfather         2 heart attacks 5 bypasses, congestive heart failure    Hyperlipidemia Paternal Grandfather     Cancer Maternal Aunt         Breast Cancer    Dementia Maternal Grandmother        Social History:   Social History     Socioeconomic History    Marital status:    Tobacco Use    Smoking status: Former     Current packs/day: 0.00     Average packs/day: 0.5 packs/day for 0.7 years (0.4 ttl pk-yrs)     Types: Cigarettes     Start date: 2022     Quit date: 2023     Years since quittin.7     Passive exposure: Past    Smokeless tobacco: Never   Vaping Use    Vaping status: Never Used   Substance and Sexual Activity    Alcohol use: Not Currently    Drug use: Never    Sexual activity: Yes     Partners: Female     Birth control/protection: None         Objective     Vital Signs  /88 (BP Location: Right arm, Patient Position: Sitting, Cuff Size: Large Adult)   Pulse 76   Temp 97.8 °F (36.6 °C) (Temporal)   Resp 16   Ht 198.1 cm (78\")   Wt (!) 158 kg (349 lb)   SpO2 98%   BMI 40.33 kg/m²   Estimated body mass index is 40.33 kg/m² as calculated from the following:    Height as of " "this encounter: 198.1 cm (78\").    Weight as of this encounter: 158 kg (349 lb).          Physical Exam  Vitals and nursing note reviewed.   Constitutional:       General: He is not in acute distress.     Appearance: Normal appearance. He is not ill-appearing or toxic-appearing.   HENT:      Head: Normocephalic and atraumatic.   Eyes:      General: No scleral icterus.        Right eye: No discharge.         Left eye: No discharge.      Conjunctiva/sclera: Conjunctivae normal.   Musculoskeletal:         General: Normal range of motion.      Comments: Left calf is mildly edematous with scattered erythematous mottling on the inner ankle and mid anterior and inner calf, no deformity of the leg. Negative Elijah's sign. Strong palpable pedal pulse.    Skin:     General: Skin is warm.      Comments: The is two open lesion on the bottom of the left foot from two burst blisters. One of the lesions is at the base of the second toe, approximately 4 mm round,  and the other lesion is at the base of the third toe and is 3-4 mm round. Scant amount of clear drainage is seen on the bandage after removal.    Neurological:      General: No focal deficit present.      Mental Status: He is alert.   Psychiatric:         Mood and Affect: Mood normal.         Behavior: Behavior normal.         Thought Content: Thought content normal.         Judgment: Judgment normal.          Assessment and Plan     Procedures      Lab Results (last 72 hours)       ** No results found for the last 72 hours. **             Diagnoses and all orders for this visit:    1. Cellulitis of left lower extremity (Primary)  -     doxycycline (ADOXA) 100 MG tablet; Take 1 tablet by mouth 2 (Two) Times a Day for 10 days.  Dispense: 20 tablet; Refill: 0    2. Edema of left lower extremity  -     US Venous Doppler Lower Extremity Left (duplex); Future    3. Type 2 diabetes mellitus without complication, without long-term current use of insulin  -     Tirzepatide 10 " MG/0.5ML solution auto-injector; Inject 10 mg under the skin into the appropriate area as directed 1 (One) Time Per Week.  Dispense: 2 mL; Refill: 1    4. Wound of left foot  -     doxycycline (ADOXA) 100 MG tablet; Take 1 tablet by mouth 2 (Two) Times a Day for 10 days.  Dispense: 20 tablet; Refill: 0        Assessment & Plan  1. Foot blisters.  - Foot blisters are likely due to prolonged walking in new boots and the presence of challenge coins in the bottom of the boot.  - Concern for potential cellulitis or deep vein thrombosis (DVT) due to recent 14-hour road trip with minimal stretching or walking during travel, and history of diabetes.  - Ultrasound will be ordered to rule out DVT.  - Antibiotics prescribed to treat potential infection; patient advised to monitor for worsening signs such as increased redness or erythematous streaking.     2. Sinus issues.  - Ongoing sinus issues following COVID-19 infection.  - Patient advised to continue monitoring symptoms and consider further evaluation if they persist. Doxycycline is prescribed.     3. Diabetes management.  - A1c a month ago was 7.6, indicating suboptimal control.  - Last kidney function was normal.  - Patient advised to monitor blood glucose levels closely to ensure proper healing of foot blisters and prevent complications.    4. MRSA history.  - History of MRSA infection in the foot.  - No recent outbreaks reported.  - Consider wound culture to identify any potential MRSA recurrence and guide appropriate antibiotic therapy.      Follow Up  Return in about 5 days (around 4/21/2025).    Patient or patient representative verbalized consent for the use of Ambient Listening during the visit with  MELL Mccormack for chart documentation. 4/17/2025  09:04 EDT    MELL Mccormack [facility]  [address] [place] [address] DR [place] [id number] [phone]

## 2025-04-17 ENCOUNTER — RESULTS FOLLOW-UP (OUTPATIENT)
Dept: FAMILY MEDICINE CLINIC | Facility: CLINIC | Age: 45
End: 2025-04-17
Payer: COMMERCIAL

## 2025-04-19 ENCOUNTER — RESULTS FOLLOW-UP (OUTPATIENT)
Dept: FAMILY MEDICINE CLINIC | Facility: CLINIC | Age: 45
End: 2025-04-19
Payer: COMMERCIAL

## 2025-04-21 ENCOUNTER — OFFICE VISIT (OUTPATIENT)
Dept: FAMILY MEDICINE CLINIC | Facility: CLINIC | Age: 45
End: 2025-04-21
Payer: COMMERCIAL

## 2025-04-21 VITALS
WEIGHT: 315 LBS | DIASTOLIC BLOOD PRESSURE: 68 MMHG | RESPIRATION RATE: 18 BRPM | SYSTOLIC BLOOD PRESSURE: 118 MMHG | OXYGEN SATURATION: 97 % | TEMPERATURE: 98.2 F | HEIGHT: 78 IN | HEART RATE: 68 BPM | BODY MASS INDEX: 36.45 KG/M2

## 2025-04-21 DIAGNOSIS — R60.0 EDEMA OF LEFT LOWER EXTREMITY: ICD-10-CM

## 2025-04-21 DIAGNOSIS — M23.52 RECURRENT LEFT KNEE INSTABILITY: ICD-10-CM

## 2025-04-21 DIAGNOSIS — I80.03 THROMBOPHLEBITIS OF SUPERFICIAL VEINS OF BOTH LOWER EXTREMITIES: ICD-10-CM

## 2025-04-21 DIAGNOSIS — L03.116 CELLULITIS OF LEFT LOWER EXTREMITY: Primary | ICD-10-CM

## 2025-04-21 PROCEDURE — 99214 OFFICE O/P EST MOD 30 MIN: CPT | Performed by: FAMILY MEDICINE

## 2025-04-21 RX ORDER — SEMAGLUTIDE 2.68 MG/ML
INJECTION, SOLUTION SUBCUTANEOUS
COMMUNITY

## 2025-04-21 RX ORDER — ACETAMINOPHEN 650 MG
TABLET, EXTENDED RELEASE ORAL AS NEEDED
Qty: 3780 ML | Refills: 0 | Status: SHIPPED | OUTPATIENT
Start: 2025-04-21

## 2025-04-21 RX ORDER — METOPROLOL SUCCINATE 50 MG/1
1 TABLET, EXTENDED RELEASE ORAL DAILY
COMMUNITY
Start: 2025-03-20

## 2025-04-21 NOTE — PROGRESS NOTES
Follow Up Office Visit      Date: 2025   Patient Name: Dom Russell  : 1980   MRN: [id number]     Chief Complaint:    Chief Complaint   Patient presents with   • Follow-up   • Foot Injury     Left foot       History of Present Illness: Dom Russell is a 44 y.o. male who is here today for follow-up.    History of Present Illness  The patient is a 44-year-old male who presents for evaluation of a popliteal cyst, recurrent left knee instability, superficial thrombophlebitis, hammertoe deformity, and an open wound on his foot.    He reports that the Doppler test results were negative. A follow-up has been scheduled regarding a potential cyst on the posterior aspect of his leg. 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 A meniscal injury is suspected, but no surgical intervention has been reported. Bilateral knee pain is also noted.    Persistent redness in the foot is managed with lotion application. Calluses on the feet are attributed to walking. A blister that was initially closed later opened upon returning home. Pain in the toes is likened to a fracture. An incident is recalled where 8 miles were walked in [place] with coins in the boots, which may have caused the blister. Altered sensation in the feet is reported, but overall good sensation is maintained. Consultation with a podiatrist has not occurred recently. Iodine was previously used for wound care but has not been applied for over a year due to the absence of open wounds. Neosporin has been applied to the current wound. Doxycycline was prescribed but has not yet been started. A history of hospitalization for a similar issue, which was more severe than the current one, is noted. Laser surgery on both feet for warts has been undergone, and special shoes and insoles have been tried without significant  improvement.    Constant movement in the veins is reported, and consultation with a vein specialist has not previously occurred.    A shift in the little toe and curling of the other toes are noted, which may be causing sores.    PAST SURGICAL HISTORY:  Laser surgery on both feet for warts.    Patient appears to be doing otherwise well.  They have continue with their medications without any side effects.  They have not had any changes in their usual activity, appetite and sleep.  Patient denies any other cardiovascular, respiratory, gastrointestinal, urologic or neurologic complaints.    Subjective      Review of Systems:   Review of Systems   Constitutional:  Negative for activity change, appetite change and fatigue.   Respiratory:  Negative for cough, chest tightness, shortness of breath and wheezing.    Cardiovascular:  Negative for chest pain, palpitations and leg swelling.   Gastrointestinal:  Negative for abdominal distention, abdominal pain, blood in stool, constipation, diarrhea, nausea, vomiting, GERD and indigestion.   Genitourinary:  Negative for difficulty urinating, dysuria, flank pain, frequency, hematuria and urgency.   Musculoskeletal:  Negative for arthralgias, back pain, gait problem, joint swelling and myalgias.   Neurological:  Negative for dizziness, tremors, seizures, syncope, weakness, light-headedness, numbness, headache and memory problem.   Psychiatric/Behavioral:  Negative for sleep disturbance and depressed mood. The patient is not nervous/anxious.        I have reviewed the patients family history, social history, past medical history, past surgical history and have updated it as appropriate.     Medications:     Current Outpatient Medications:   •  amLODIPine (NORVASC) 10 MG tablet, Take 1 tablet by mouth Daily., Disp: 90 tablet, Rfl: 3  •  aspirin 81 MG EC tablet, Take 1 tablet by mouth Daily., Disp: , Rfl:   •  benazepril (LOTENSIN) 20 MG tablet, Take 1 tablet by mouth daily, Disp: 90  tablet, Rfl: 3  •  busPIRone (BUSPAR) 5 MG tablet, Take 1 tablet by mouth 3 (Three) Times a Day., Disp: 90 tablet, Rfl: 1  •  cetirizine (ZyrTEC) 10 MG chewable tablet, 1 tablet., Disp: , Rfl:   •  Continuous Glucose Sensor (FreeStyle Wen 3 Sensor) misc, Use 1 each Every 14 (Fourteen) Days., Disp: 2 each, Rfl: 11  •  Dextromethorphan-buPROPion ER (Auvelity)  MG tablet controlled-release, Take 1 tablet by mouth 2 (Two) Times a Day., Disp: 60 tablet, Rfl: 2  •  fenofibrate micronized (LOFIBRA) 134 MG capsule, 1 capsule., Disp: , Rfl:   •  metFORMIN ER (GLUCOPHAGE-XR) 500 MG 24 hr tablet, Take 2 tablets by mouth 2 (Two) Times a Day., Disp: 360 tablet, Rfl: 3  •  metoprolol succinate XL (TOPROL-XL) 50 MG 24 hr tablet, Take 1 tablet by mouth Daily., Disp: , Rfl:   •  omeprazole (priLOSEC) 40 MG capsule, Take 1 capsule by mouth Daily., Disp: 90 capsule, Rfl: 1  •  propranolol (INDERAL) 20 MG tablet, Take 1/2 to 1 tablet (10 to 20 mg) up to three time daily as needed for anxiety., Disp: 90 tablet, Rfl: 1  •  rosuvastatin (CRESTOR) 20 MG tablet, Take 1 tablet by mouth Daily., Disp: 90 tablet, Rfl: 1  •  Semaglutide, 2 MG/DOSE, (Ozempic, 2 MG/DOSE,) 8 MG/3ML solution pen-injector, Inject 2 mg under the skin into the appropriate area as directed 1 (One) Time Per Week., Disp: , Rfl:   •  Tirzepatide 10 MG/0.5ML solution auto-injector, Inject 10 mg under the skin into the appropriate area as directed 1 (One) Time Per Week., Disp: 2 mL, Rfl: 1  •  vilazodone (VIIBRYD) 10 MG tablet tablet, Take 1 tablet by mouth Daily., Disp: 30 tablet, Rfl: 1  •  povidone-iodine (Betadine) 10 % external solution 10%, Apply  topically to the appropriate area as directed As Needed for Wound Care., Disp: 3780 mL, Rfl: 0    Allergies:   Allergies   Allergen Reactions   • Amoxicillin Hives   • Ceftazidime Hives   • Cephalosporins Hives and Shortness Of Breath   • Clarithromycin Hives and Anaphylaxis   • Codeine Hives   • Penicillins Hives and  "Seizure   • Sulbactam Hives   • Unasyn [Ampicillin-Sulbactam Sodium] Hives       Immunizations:   Immunization History   Administered Date(s) Administered   • Flu Vaccine Quad PF >36MO 10/18/2021, 10/03/2022   • Fluzone  >6mos 12/09/2024   • Fluzone (or Fluarix & Flulaval for VFC) >6mos 10/18/2021, 10/03/2022   • Fluzone High-Dose 65+YRS 10/30/2017   • Fluzone Quad >6mos (Multi-dose) 11/30/2018, 10/01/2019   • Hepatitis A 11/01/2018, 11/30/2018, 06/21/2019   • Hepatitis B Adult/Adolescent IM 02/14/2006, 06/27/2006, 08/29/2006, 08/31/2020, 11/09/2020   • Influenza, Unspecified 12/28/2023   • Pneumococcal Conjugate 20-Valent (PCV20) 03/07/2023   • Pneumococcal Polysaccharide (PPSV23) 12/20/2011, 06/06/2019   • Td (TDVAX) 08/14/1997   • Tdap 12/07/2022        Objective     Physical Exam: Please see above  Vital Signs:   Vitals:    04/21/25 1450   BP: 118/68   BP Location: Left arm   Patient Position: Sitting   Cuff Size: Large Adult   Pulse: 68   Resp: 18   Temp: 98.2 °F (36.8 °C)   TempSrc: Temporal   SpO2: 97%   Weight: (!) 158 kg (349 lb 6.4 oz)   Height: 198.1 cm (78\")     Body mass index is 40.38 kg/m².          Physical Exam  Vitals and nursing note reviewed.   Constitutional:       Appearance: Normal appearance.   HENT:      Head: Normocephalic and atraumatic.      Nose: Nose normal.      Mouth/Throat:      Pharynx: Oropharynx is clear.   Eyes:      Extraocular Movements: Extraocular movements intact.      Pupils: Pupils are equal, round, and reactive to light.   Neck:      Thyroid: No thyroid mass or thyromegaly.      Trachea: Trachea normal.   Cardiovascular:      Rate and Rhythm: Normal rate and regular rhythm.      Pulses: Normal pulses. No decreased pulses.      Heart sounds: Normal heart sounds.   Pulmonary:      Effort: Pulmonary effort is normal.      Breath sounds: Normal breath sounds.   Abdominal:      General: Abdomen is flat. Bowel sounds are normal.      Palpations: Abdomen is soft.      " Tenderness: There is no abdominal tenderness.   Musculoskeletal:      Cervical back: Neck supple.      Right lower leg: No edema.      Left lower leg: No edema.   Lymphadenopathy:      Cervical: No cervical adenopathy.   Skin:     General: Skin is warm and dry.   Neurological:      General: No focal deficit present.      Mental Status: He is alert and oriented to person, place, and time.      Sensory: Sensation is intact.      Motor: Motor function is intact.      Coordination: Coordination is intact.   Psychiatric:         Attention and Perception: Attention normal.         Mood and Affect: Mood normal.         Speech: Speech normal.         Behavior: Behavior normal.         Procedures    Results:   Labs:   Hemoglobin A1C   Date Value Ref Range Status   03/10/2025 7.6 (A) 4.5 - 5.7 % Final   09/06/2024 7.10 (H) 4.80 - 5.60 % Final     TSH   Date Value Ref Range Status   09/06/2024 2.240 0.270 - 4.200 uIU/mL Final        POCT Results (if applicable):   Results for orders placed or performed in visit on 03/19/25   POCT SARS-CoV-2 + Flu Antigen GAYE    Collection Time: 03/19/25  4:14 PM    Specimen: Swab   Result Value Ref Range    SARS Antigen Detected (A) Not Detected, Presumptive Negative    Influenza A Antigen GAYE Not Detected Not Detected    Influenza B Antigen GAYE Not Detected Not Detected    Internal Control Passed Passed    Lot Number [id number]     Expiration Date 10/23/2025        Imaging:   No valid procedures specified.     Measures:   Advanced Care Planning:   Patient does not have an advance directive, information provided.    Smoking Cessation:   Non-smoker.    Assessment / Plan      Assessment/Plan:   Diagnoses and all orders for this visit:    1. Cellulitis of left lower extremity (Primary)  Patient does have an open wound on the bottom of his foot.  And there does not appear to be any associated cellulitis at present time.  We will start Betadine cleansing and refer back to the podiatrist.  He may  need more aggressive treatment.  If patient develops fever or chills etc. he will present himself to the emergency department for further evaluation and possible admission for IV antibiotics.  -     povidone-iodine (Betadine) 10 % external solution 10%; Apply  topically to the appropriate area as directed As Needed for Wound Care.  Dispense: 3780 mL; Refill: 0  -     Ambulatory Referral to Podiatry    2. Edema of left lower extremity   Improved.  It did not appear that his venous Doppler revealed any abnormality except for a popliteal cyst.  We will continue to monitor symptoms and since he does have varicosities with possible superficial thrombosis we will refer him to a vein specialist.    3. Recurrent left knee instability   Patient has had a tear of his left meniscus and is having some instability at present time.  He did have a popliteal cyst that was probably due to previous injury.  He is not having any difficulties currently.  We will continue with his current management close observation and if he does have worsening problems referral to an orthopedist may be necessary.    4. Thrombophlebitis of superficial veins of both lower extremities  Patient does have some varicosities and what appears to be some superficial thrombophlebitis.  We have discussed options and due to his intermittent swelling and possible lymphedema we will refer him to the vein specialist for possible segmental pneumatic device.  -     Ambulatory Referral to Vascular Surgery        Follow Up:   Return in about 8 weeks (around 6/19/2025).      At [facility], we believe that sharing information builds trust and better relationships. You are receiving this note because you recently visited [facility]. It is possible you will see health information before a provider has talked with you about it. This kind of information can be easy to misunderstand. To help you fully understand what it means for your health, we urge you to discuss  this note with your provider.    Rigoberto Brand MD  [facility]    Patient or patient representative verbalized consent for the use of Ambient Listening during the visit with  Rigoberto Brand MD for chart documentation. 4/21/2025  15:10 EDT

## 2025-04-24 NOTE — PROGRESS NOTES
Attempted to reach no answer      Relay       Your Holter monitor did not reveal any abnormality.

## 2025-04-25 NOTE — TELEPHONE ENCOUNTER
Contacted patient to speak about holter. Patient verbalized good understanding and appreciation. No questions or concerns.

## 2025-04-28 ENCOUNTER — TELEPHONE (OUTPATIENT)
Dept: FAMILY MEDICINE CLINIC | Facility: CLINIC | Age: 45
End: 2025-04-28
Payer: COMMERCIAL

## 2025-04-28 NOTE — TELEPHONE ENCOUNTER
Relay     Detailed message left for Dr René Peguero's office, following up on current referral appointment status. Request to have staff outreach to Pcp/referral office with current update on referral status.

## 2025-06-19 ENCOUNTER — OFFICE VISIT (OUTPATIENT)
Dept: FAMILY MEDICINE CLINIC | Facility: CLINIC | Age: 45
End: 2025-06-19
Payer: COMMERCIAL

## 2025-06-19 VITALS
DIASTOLIC BLOOD PRESSURE: 68 MMHG | HEIGHT: 78 IN | SYSTOLIC BLOOD PRESSURE: 130 MMHG | OXYGEN SATURATION: 96 % | WEIGHT: 315 LBS | BODY MASS INDEX: 36.45 KG/M2 | TEMPERATURE: 98.1 F | HEART RATE: 72 BPM

## 2025-06-19 DIAGNOSIS — F32.A ANXIETY AND DEPRESSION: ICD-10-CM

## 2025-06-19 DIAGNOSIS — Z00.00 WELL ADULT EXAM: Primary | ICD-10-CM

## 2025-06-19 DIAGNOSIS — E78.2 MIXED HYPERLIPIDEMIA: ICD-10-CM

## 2025-06-19 DIAGNOSIS — E11.9 ENCOUNTER FOR DIABETIC FOOT EXAM: ICD-10-CM

## 2025-06-19 DIAGNOSIS — E11.9 TYPE 2 DIABETES MELLITUS WITHOUT COMPLICATION, WITHOUT LONG-TERM CURRENT USE OF INSULIN: ICD-10-CM

## 2025-06-19 DIAGNOSIS — I10 PRIMARY HYPERTENSION: ICD-10-CM

## 2025-06-19 DIAGNOSIS — F41.9 ANXIETY AND DEPRESSION: ICD-10-CM

## 2025-06-19 DIAGNOSIS — K75.81 METABOLIC DYSFUNCTION-ASSOCIATED STEATOHEPATITIS (MASH): ICD-10-CM

## 2025-06-19 DIAGNOSIS — K21.9 CHRONIC GERD: ICD-10-CM

## 2025-06-19 LAB — HBA1C MFR BLD: 5.8 %

## 2025-06-19 NOTE — PROGRESS NOTES
Male Physical Note      Date: 2025   Patient Name: Dom Russell  : 1980   MRN: [id number]     Chief Complaint:    Chief Complaint   Patient presents with    Follow-up     3 month       History of Present Illness: Dom Russell is a 44 y.o. male who is here today for their annual health maintenance and physical.  Patient also returns to clinic for follow-up of his chronic medical conditions.    History of Present Illness  The patient is a 44-year-old male who presents for a follow-up visit.    He has discontinued the use of antibiotics, including doxycycline, levofloxacin, and Bactrim, due to the complete healing of his foot. He reports no current feelings of anxiety or depression. He has been using Vicks VapoRub on his feet and toenails.    He is currently on Zepbound 10 mg and Mounjaro, which he reports tolerating well without any adverse effects. He has experienced a weight loss of 5 pounds and reports a decrease in appetite. He recalls a period when his weight was around 300 pounds, during which he was not on any medication. He has 2 remaining doses of the 10 mg injection.    He is also on amlodipine, benazepril, fenofibrate, metformin, metoprolol, omeprazole, propranolol, and rosuvastatin. He reports that his current medication regimen is effective, although he believes an adjustment may be necessary due to persistent issues with irritability. He is not taking Viibryd. He is taking BuSpar 5 mg 3 times a day. He is taking dextromethorphan-bupropion. He is taking propranolol for anxiety. He is taking rosuvastatin. He is taking metoprolol and propranolol. He is taking omeprazole once a day. If he misses a dose of omeprazole, he experiences symptoms the following day, which are exacerbated by certain foods.     Patient appears to be doing otherwise well.  They have continue with their medications without any side effects.  They have not had any changes in their usual activity,  appetite and sleep.  Patient denies any other cardiovascular, respiratory, gastrointestinal, urologic or neurologic complaints.    Subjective      Review of Systems:   Review of Systems   Constitutional:  Negative for activity change, appetite change and fatigue.   Respiratory:  Negative for cough, chest tightness, shortness of breath and wheezing.    Cardiovascular:  Negative for chest pain, palpitations and leg swelling.   Gastrointestinal:  Negative for abdominal distention, abdominal pain, blood in stool, constipation, diarrhea, nausea, vomiting, GERD and indigestion.   Genitourinary:  Negative for difficulty urinating, dysuria, flank pain, frequency, hematuria and urgency.   Musculoskeletal:  Negative for arthralgias, back pain, gait problem, joint swelling and myalgias.   Neurological:  Negative for dizziness, tremors, seizures, syncope, weakness, light-headedness, numbness, headache and memory problem.   Psychiatric/Behavioral:  Negative for sleep disturbance and depressed mood. The patient is not nervous/anxious.        Past Medical History, Social History, Family History and Care Team were all reviewed with patient and updated as appropriate.     Medications:     Current Outpatient Medications:     amLODIPine (NORVASC) 10 MG tablet, Take 1 tablet by mouth Daily., Disp: 90 tablet, Rfl: 3    aspirin 81 MG EC tablet, Take 1 tablet by mouth Daily., Disp: , Rfl:     benazepril (LOTENSIN) 20 MG tablet, Take 1 tablet by mouth daily, Disp: 90 tablet, Rfl: 3    busPIRone (BUSPAR) 5 MG tablet, Take 1 tablet by mouth 3 (Three) Times a Day., Disp: 90 tablet, Rfl: 1    cetirizine (ZyrTEC) 10 MG chewable tablet, 1 tablet., Disp: , Rfl:     Continuous Glucose Sensor (FreeStyle Wen 3 Sensor) misc, Use 1 each Every 14 (Fourteen) Days., Disp: 2 each, Rfl: 11    Dextromethorphan-buPROPion ER (Auvelity)  MG tablet controlled-release, Take 1 tablet by mouth 2 (Two) Times a Day., Disp: 60 tablet, Rfl: 2    fenofibrate  micronized (LOFIBRA) 134 MG capsule, 1 capsule., Disp: , Rfl:     metFORMIN ER (GLUCOPHAGE-XR) 500 MG 24 hr tablet, Take 2 tablets by mouth 2 (Two) Times a Day., Disp: 360 tablet, Rfl: 3    metoprolol succinate XL (TOPROL-XL) 50 MG 24 hr tablet, Take 1 tablet by mouth Daily., Disp: , Rfl:     omeprazole (priLOSEC) 40 MG capsule, Take 1 capsule by mouth Daily., Disp: 90 capsule, Rfl: 1    propranolol (INDERAL) 20 MG tablet, Take 1/2 to 1 tablet (10 to 20 mg) up to three time daily as needed for anxiety., Disp: 90 tablet, Rfl: 1    rosuvastatin (CRESTOR) 20 MG tablet, Take 1 tablet by mouth Daily., Disp: 90 tablet, Rfl: 1    Tirzepatide 12.5 MG/0.5ML solution auto-injector, Inject 0.5 mL under the skin into the appropriate area as directed 1 (One) Time Per Week., Disp: 2 mL, Rfl: 3    Allergies:   Allergies   Allergen Reactions    Amoxicillin Hives    Ceftazidime Hives    Cephalosporins Hives and Shortness Of Breath    Clarithromycin Hives and Anaphylaxis    Codeine Hives    Penicillins Hives and Seizure    Sulbactam Hives    Unasyn [Ampicillin-Sulbactam Sodium] Hives       Immunizations:  Health Maintenance Summary            Upcoming       COVID-19 Vaccine (1 - 2024-25 season) Postponed until 10/2/2025      10/03/2024  Postponed until 10/2/2025 by Rigoberto Brand MD (Patient Refused)    06/05/2024  Postponed until 6/5/2025 by Elaina Ledezma MA (Patient Refused)    03/07/2023  Postponed until 3/26/2024 by Rigoberto Brand MD (Pending event - Advised.)    12/07/2022  Postponed until 12/9/2022 by Rigoberto Brand MD (Patient Refused)              INFLUENZA VACCINE (Yearly - July to March) Next due on 7/1/2025 12/09/2024  Imm Admin: Fluzone  >6mos    08/14/2024  Postponed until 3/31/2025 by Elaina Ledezma MA (Product Unavailable)    12/28/2023  Imm Admin: Influenza, Unspecified    10/03/2022  Imm Admin: Flu Vaccine Quad PF >36MO    10/03/2022  Imm Admin: Fluzone (or Fluarix & Flulaval  for VFC) >6mos     Only the first 5 history entries have been loaded, but more history exists.            LIPID PANEL (Yearly) Next due on 9/6/2025 09/06/2024  Lipid Panel    03/07/2023  Lipid Panel    12/07/2022  Lipid Panel    04/25/2022  Done    11/23/2020  Lipid Panel      Only the first 5 history entries have been loaded, but more history exists.              HEMOGLOBIN A1C (Every 6 Months) Next due on 11/21/2025 05/21/2025  Hemoglobin A1C component of Hemoglobin A1c    03/10/2025  Hemoglobin A1C component of POC Glycosylated Hemoglobin (Hb A1C)    12/09/2024  Hemoglobin A1C component of POC Glycosylated Hemoglobin (Hb A1C)    09/06/2024  Hemoglobin A1C component of Hemoglobin A1c    01/09/2024  Hemoglobin A1C component of POC Glycosylated Hemoglobin (Hb A1C)      Only the first 5 history entries have been loaded, but more history exists.              URINE MICROALBUMIN-CREATININE RATIO (uACR) (Yearly) Next due on 3/10/2026      03/10/2025  POC Albumin/Creatinine Ratio Urine              DIABETIC EYE EXAM (Yearly) Next due on 5/21/2026 05/21/2025  EYE EXAM SCANNED    11/19/2024  EYE EXAM SCANNED    11/19/2024  EYE EXAM SCANNED    06/07/2023  Done    12/17/2018  RETINAVUE - DIABETIC EYE EXAM      Only the first 5 history entries have been loaded, but more history exists.              ANNUAL PHYSICAL (Yearly) Next due on 6/19/2026 06/19/2025  Done    06/05/2024  Done    03/07/2023  Done              DIABETIC FOOT EXAM (Yearly) Next due on 6/19/2026 06/19/2025  Visit Dx: Encounter for diabetic foot exam    06/05/2024  Done    12/07/2022  Done              TDAP/TD VACCINES (3 - Td or Tdap) Next due on 12/7/2032 12/07/2022  Imm Admin: Tdap    08/14/1997  Imm Admin: Td (TDVAX)                      Completed or No Longer Recommended       HEPATITIS C SCREENING  Completed      12/07/2022  Hepatitis C Antibody              Hepatitis B (Series Information) Completed      11/09/2020  Imm  "Admin: Hepatitis B Adult/Adolescent IM    08/31/2020  Imm Admin: Hepatitis B Adult/Adolescent IM    08/29/2006  Imm Admin: Hepatitis B Adult/Adolescent IM    06/27/2006  Imm Admin: Hepatitis B Adult/Adolescent IM    02/14/2006  Imm Admin: Hepatitis B Adult/Adolescent IM      Only the first 5 history entries have been loaded, but more history exists.              Pneumococcal Vaccine 0-49 (Series Information) Completed      03/07/2023  Imm Admin: Pneumococcal Conjugate 20-Valent (PCV20)    06/06/2019  Imm Admin: Pneumococcal Polysaccharide (PPSV23)    12/20/2011  Imm Admin: Pneumococcal Polysaccharide (PPSV23)                            No orders of the defined types were placed in this encounter.      Colorectal Screening:   Deferred.  Last Completed Colonoscopy    This patient has no relevant Health Maintenance data.       CT for Smoker (Age 50-80, 20pk yr within last 15 years): Deferred  Bone Density/DEXA (high risk): Deferred  Hep C (Age 18-79 once): Previously ordered  HIV (Age 15-65 once) : Deferred  PSA (Over age 50, C Level Recommendation): Deferred  US Aorta (For male smokers, age 65): Deferred  A1c:   Hemoglobin A1C   Date Value Ref Range Status   05/21/2025 5.8  Final     Lipid panel: No results found for: \"LABLIPI\"    The ASCVD Risk score (Francisca SANTACRUZ, et al., 2019) failed to calculate for the following reasons:    The valid total cholesterol range is 130 to 320 mg/dL    Dermatology: Advised if necessary  Ophthalmologist: Up-to-date  Dentist: Up-to-date    Tobacco Use: Medium Risk (6/19/2025)    Patient History     Smoking Tobacco Use: Former     Smokeless Tobacco Use: Never     Passive Exposure: Past       Social History     Substance and Sexual Activity   Alcohol Use Not Currently        Social History     Substance and Sexual Activity   Drug Use Never        Diet/Physical activity: Well-balanced diet/daily exercise    Sexual health: No concerns at present time    PHQ-2 Depression Screening  PHQ-9 Total " "Score:  0    Measures:   Advanced Care Planning:   Patient does not have an advance directive, information provided.    Smoking Cessation:   Non-smoker.     Objective     Physical Exam:  Vital Signs:   Vitals:    06/19/25 1624   BP: 130/68   BP Location: Left arm   Patient Position: Sitting   Cuff Size: Large Adult   Pulse: 72   Temp: 98.1 °F (36.7 °C)   TempSrc: Temporal   SpO2: 96%   Weight: (!) 156 kg (344 lb)   Height: 198.1 cm (78\")   PainSc: 0-No pain     Body mass index is 39.75 kg/m².     Physical Exam  Vitals and nursing note reviewed.   Constitutional:       Appearance: Normal appearance.   HENT:      Head: Normocephalic and atraumatic.      Nose: Nose normal.      Mouth/Throat:      Pharynx: Oropharynx is clear.   Eyes:      Extraocular Movements: Extraocular movements intact.      Pupils: Pupils are equal, round, and reactive to light.   Neck:      Thyroid: No thyroid mass or thyromegaly.      Trachea: Trachea normal.   Cardiovascular:      Rate and Rhythm: Normal rate and regular rhythm.      Pulses: Normal pulses. No decreased pulses.           Dorsalis pedis pulses are 2+ on the right side and 2+ on the left side.        Posterior tibial pulses are 2+ on the right side and 2+ on the left side.      Heart sounds: Normal heart sounds.   Pulmonary:      Effort: Pulmonary effort is normal.      Breath sounds: Normal breath sounds.   Abdominal:      General: Abdomen is flat. Bowel sounds are normal.      Palpations: Abdomen is soft.      Tenderness: There is no abdominal tenderness.   Musculoskeletal:      Cervical back: Neck supple.      Right lower leg: No edema.      Left lower leg: No edema.      Right foot: No deformity.      Left foot: No deformity.   Feet:      Right foot:      Protective Sensation: 5 sites tested.  5 sites sensed.      Skin integrity: Callus and dry skin present. No ulcer, skin breakdown or erythema.      Toenail Condition: Fungal disease present.     Left foot:      Protective " Sensation: 5 sites tested.  5 sites sensed.      Skin integrity: Callus and dry skin present. No ulcer, skin breakdown or erythema.      Toenail Condition: Left toenails are normal.   Lymphadenopathy:      Cervical: No cervical adenopathy.   Skin:     General: Skin is warm and dry.   Neurological:      General: No focal deficit present.      Mental Status: He is alert and oriented to person, place, and time.      Sensory: Sensation is intact.      Motor: Motor function is intact.      Coordination: Coordination is intact.   Psychiatric:         Attention and Perception: Attention normal.         Mood and Affect: Mood normal.         Speech: Speech normal.         Behavior: Behavior normal.          POCT Results (if applicable):       Procedures    Assessment / Plan      Assessment/Plan:   Diagnoses and all orders for this visit:    1. Well adult exam (Primary)   Patient did have a wellness exam performed today that did not reveal any abnormality.  Patient's anxiety/depression scores were reviewed.  We will continue to monitor laboratory data as well as symptomatology and if there are any other questions or concerns prior to the next scheduled follow-up they will contact us.    2. Type 2 diabetes mellitus without complication, without long-term current use of insulin  Patient does appear to be doing relatively well with respect to their diabetes.  They are tolerating their medications without complaints.  We will continue to follow their hemoglobin A1c and will make adjustments to keep their level less than 7%.  -     Tirzepatide 12.5 MG/0.5ML solution auto-injector; Inject 0.5 mL under the skin into the appropriate area as directed 1 (One) Time Per Week.  Dispense: 2 mL; Refill: 3    3. Encounter for diabetic foot exam   Patient did have a diabetic foot exam obtained.  He did not have any open lesions at present time but does have significant amount of callus and dry skin.  We have encouraged him to use Vicks vapor  rub and continue with daily inspection and close observation.  We have encouraged him to schedule follow-up with Dr. Ashford.  We will continue to monitor his symptoms and if he has other questions or concerns prior to his next clinic follow-up he will contact us.    4. Primary hypertension   Patient appears to be tolerating their blood pressure medicine without any side effects.  We will continue to monitor their blood pressure and will adjust to keep there is systolic blood pressure less than 130.  We will continue to monitor renal function and will make adjustments based on these findings.    5. Metabolic dysfunction-associated steatohepatitis (MASH)   Patient does appear to have metabolic dysfunction of your liver secondary to fatty liver disease.  They understands the importance of a low carbohydrate/low-fat diet as well as 300 minutes of aerobic exercise weekly.  We will continue to monitor and will obtain laboratory data intermittently to assure us there is no progression of the disease.  A liver ultrasound as well elastography may be consider intermittently.  Patient understands the importance of weight loss.  There may be an indication of use of a GLP-1 agonist.    6. Mixed hyperlipidemia   Patient does appear to be tolerating their lipid-lowering agent without difficulty.  We will obtain the lipid profile as well as liver function test to observe for any abnormalities.  We will continue to adjust medications to keep their LDL less than 70.    7. Anxiety and depression   Patient appears to be doing well at present time with respect to their anxiety.  They are tolerating their medications and other treatment plans without difficulty.  We will continue with current regimen and if they have any other problems or complaints prior to her next scheduled follow-up they will contact us.  Adjustments of medications or referral to a behavioral health specialist may be necessary in the future.    8. Chronic  GERD   Patient is doing well at present time with respect to the reflux symptomatology.  They are tolerating their medications without any complaints at present time.  We will continue to monitor their symptoms and if they develop dysphagia, alarm symptoms or worsening symptomatology further evaluation and treatment may be necessary as well as possible referral for an EGD.       Healthcare Maintenance:  Counseling provided based on age appropriate USPSTF guidelines.       Dom Russell voices understanding and acceptance of this advice and will call back with any further questions or concerns. AVS with preventive healthcare tips printed for patient.     Follow Up:   No follow-ups on file.    At [facility], we believe that sharing information builds trust and better relationships. You are receiving this note because you recently visited [facility]. It is possible you will see health information before a provider has talked with you about it. This kind of information can be easy to misunderstand. To help you fully understand what it means for your health, we urge you to discuss this note with your provider.    Rigoberto Brand MD  [facility]

## 2025-06-20 ENCOUNTER — PATIENT ROUNDING (BHMG ONLY) (OUTPATIENT)
Dept: FAMILY MEDICINE CLINIC | Facility: CLINIC | Age: 45
End: 2025-06-20
Payer: COMMERCIAL

## 2025-06-20 NOTE — PROGRESS NOTES
A [employer] message has been sent to the patient for PATIENT  ROUNDING with [facility]

## 2025-06-26 DIAGNOSIS — I10 PRIMARY HYPERTENSION: ICD-10-CM

## 2025-06-26 DIAGNOSIS — K21.9 CHRONIC GERD: ICD-10-CM

## 2025-06-26 RX ORDER — METOPROLOL SUCCINATE 50 MG/1
50 TABLET, EXTENDED RELEASE ORAL DAILY
Qty: 90 TABLET | Refills: 1 | Status: SHIPPED | OUTPATIENT
Start: 2025-06-26

## 2025-06-26 RX ORDER — AMLODIPINE BESYLATE 10 MG/1
10 TABLET ORAL DAILY
Qty: 90 TABLET | Refills: 1 | Status: SHIPPED | OUTPATIENT
Start: 2025-06-26

## 2025-06-26 RX ORDER — ROSUVASTATIN CALCIUM 20 MG/1
20 TABLET, COATED ORAL DAILY
Qty: 90 TABLET | Refills: 1 | Status: SHIPPED | OUTPATIENT
Start: 2025-06-26

## 2025-06-26 RX ORDER — OMEPRAZOLE 40 MG/1
40 CAPSULE, DELAYED RELEASE ORAL DAILY
Qty: 90 CAPSULE | Refills: 1 | Status: SHIPPED | OUTPATIENT
Start: 2025-06-26

## 2025-07-11 ENCOUNTER — HOSPITAL ENCOUNTER (EMERGENCY)
Facility: HOSPITAL | Age: 45
Discharge: HOME | End: 2025-07-11
Attending: EMERGENCY MEDICINE
Payer: COMMERCIAL

## 2025-07-11 ENCOUNTER — APPOINTMENT (OUTPATIENT)
Dept: GENERAL RADIOLOGY | Facility: HOSPITAL | Age: 45
End: 2025-07-11
Payer: COMMERCIAL

## 2025-07-11 ENCOUNTER — TELEPHONE (OUTPATIENT)
Dept: CARDIOLOGY | Facility: HOSPITAL | Age: 45
End: 2025-07-11
Payer: COMMERCIAL

## 2025-07-11 VITALS
DIASTOLIC BLOOD PRESSURE: 91 MMHG | HEART RATE: 79 BPM | WEIGHT: 315 LBS | RESPIRATION RATE: 20 BRPM | OXYGEN SATURATION: 99 % | HEIGHT: 78 IN | SYSTOLIC BLOOD PRESSURE: 134 MMHG | TEMPERATURE: 97.8 F | BODY MASS INDEX: 36.45 KG/M2

## 2025-07-11 DIAGNOSIS — R07.9 NONSPECIFIC CHEST PAIN: Primary | ICD-10-CM

## 2025-07-11 DIAGNOSIS — F41.9 ANXIETY: ICD-10-CM

## 2025-07-11 DIAGNOSIS — R00.2 PALPITATIONS: ICD-10-CM

## 2025-07-11 DIAGNOSIS — E66.01 MORBID OBESITY: ICD-10-CM

## 2025-07-11 LAB
ALBUMIN SERPL-MCNC: 4.5 G/DL (ref 3.5–5.2)
ALBUMIN/GLOB SERPL: 1.9 G/DL
ALP SERPL-CCNC: 68 U/L (ref 39–117)
ALT SERPL W P-5'-P-CCNC: 28 U/L (ref 1–41)
ANION GAP SERPL CALCULATED.3IONS-SCNC: 11.9 MMOL/L (ref 5–15)
AST SERPL-CCNC: 30 U/L (ref 1–40)
BASOPHILS # BLD AUTO: 0.05 10*3/MM3 (ref 0–0.2)
BASOPHILS NFR BLD AUTO: 0.6 % (ref 0–1.5)
BILIRUB SERPL-MCNC: 0.5 MG/DL (ref 0–1.2)
BUN SERPL-MCNC: 13.8 MG/DL (ref 6–20)
BUN/CREAT SERPL: 19.4 (ref 7–25)
CALCIUM SPEC-SCNC: 8.8 MG/DL (ref 8.6–10.5)
CHLORIDE SERPL-SCNC: 104 MMOL/L (ref 98–107)
CO2 SERPL-SCNC: 22.1 MMOL/L (ref 22–29)
CREAT SERPL-MCNC: 0.71 MG/DL (ref 0.76–1.27)
DEPRECATED RDW RBC AUTO: 46.5 FL (ref 37–54)
EGFRCR SERPLBLD CKD-EPI 2021: 117.5 ML/MIN/1.73
EOSINOPHIL # BLD AUTO: 0.19 10*3/MM3 (ref 0–0.4)
EOSINOPHIL NFR BLD AUTO: 2.3 % (ref 0.3–6.2)
ERYTHROCYTE [DISTWIDTH] IN BLOOD BY AUTOMATED COUNT: 14.5 % (ref 12.3–15.4)
GLOBULIN UR ELPH-MCNC: 2.4 GM/DL
GLUCOSE SERPL-MCNC: 184 MG/DL (ref 65–99)
HCT VFR BLD AUTO: 43.1 % (ref 37.5–51)
HGB BLD-MCNC: 15 G/DL (ref 13–17.7)
HOLD SPECIMEN: NORMAL
IMM GRANULOCYTES # BLD AUTO: 0.04 10*3/MM3 (ref 0–0.05)
IMM GRANULOCYTES NFR BLD AUTO: 0.5 % (ref 0–0.5)
LYMPHOCYTES # BLD AUTO: 2.87 10*3/MM3 (ref 0.7–3.1)
LYMPHOCYTES NFR BLD AUTO: 35.3 % (ref 19.6–45.3)
MAGNESIUM SERPL-MCNC: 2.3 MG/DL (ref 1.6–2.6)
MCH RBC QN AUTO: 30.3 PG (ref 26.6–33)
MCHC RBC AUTO-ENTMCNC: 34.8 G/DL (ref 31.5–35.7)
MCV RBC AUTO: 87.1 FL (ref 79–97)
MONOCYTES # BLD AUTO: 0.56 10*3/MM3 (ref 0.1–0.9)
MONOCYTES NFR BLD AUTO: 6.9 % (ref 5–12)
NEUTROPHILS NFR BLD AUTO: 4.43 10*3/MM3 (ref 1.7–7)
NEUTROPHILS NFR BLD AUTO: 54.4 % (ref 42.7–76)
NRBC BLD AUTO-RTO: 0 /100 WBC (ref 0–0.2)
NT-PROBNP SERPL-MCNC: <36 PG/ML (ref 0–450)
PLATELET # BLD AUTO: 147 10*3/MM3 (ref 140–450)
PMV BLD AUTO: 10.2 FL (ref 6–12)
POTASSIUM SERPL-SCNC: 3.9 MMOL/L (ref 3.5–5.2)
PROT SERPL-MCNC: 6.9 G/DL (ref 6–8.5)
QT INTERVAL: 392 MS
QTC INTERVAL: 443 MS
RBC # BLD AUTO: 4.95 10*6/MM3 (ref 4.14–5.8)
SODIUM SERPL-SCNC: 138 MMOL/L (ref 136–145)
TROPONIN T SERPL HS-MCNC: 8 NG/L
TSH SERPL DL<=0.05 MIU/L-ACNC: 8.18 UIU/ML (ref 0.27–4.2)
WBC NRBC COR # BLD AUTO: 8.14 10*3/MM3 (ref 3.4–10.8)
WHOLE BLOOD HOLD COAG: NORMAL
WHOLE BLOOD HOLD SPECIMEN: NORMAL

## 2025-07-11 PROCEDURE — 83735 ASSAY OF MAGNESIUM: CPT

## 2025-07-11 PROCEDURE — 84484 ASSAY OF TROPONIN QUANT: CPT | Performed by: EMERGENCY MEDICINE

## 2025-07-11 PROCEDURE — 99284 EMERGENCY DEPT VISIT MOD MDM: CPT

## 2025-07-11 PROCEDURE — 80050 GENERAL HEALTH PANEL: CPT

## 2025-07-11 PROCEDURE — 36415 COLL VENOUS BLD VENIPUNCTURE: CPT

## 2025-07-11 PROCEDURE — 93005 ELECTROCARDIOGRAM TRACING: CPT | Performed by: EMERGENCY MEDICINE

## 2025-07-11 PROCEDURE — 71045 X-RAY EXAM CHEST 1 VIEW: CPT

## 2025-07-11 PROCEDURE — 93005 ELECTROCARDIOGRAM TRACING: CPT

## 2025-07-11 PROCEDURE — 83880 ASSAY OF NATRIURETIC PEPTIDE: CPT

## 2025-07-11 RX ORDER — SODIUM CHLORIDE 0.9 % (FLUSH) 0.9 %
10 SYRINGE (ML) INJECTION AS NEEDED
Status: DISCONTINUED | OUTPATIENT
Start: 2025-07-11 | End: 2025-07-11 | Stop reason: HOSPADM

## 2025-07-11 RX ORDER — DIAZEPAM 5 MG/1
5 TABLET ORAL ONCE
Status: COMPLETED | OUTPATIENT
Start: 2025-07-11 | End: 2025-07-11

## 2025-07-11 RX ADMIN — DIAZEPAM 5 MG: 5 TABLET ORAL at 03:37

## 2025-07-11 NOTE — TELEPHONE ENCOUNTER
Call X 1.  ER Referred Patient To  Clinic 07/11/2025. Called Patient 07/11/2025. Scheduled Patient First Available Appointment 07/17/2025.

## 2025-07-11 NOTE — ED PROVIDER NOTES
Subjective   History of Present Illness  Is a 44-year-old male with a history of hypertension presenting to the emergency department with some palpitations chest discomfort.  The patient states that he was at a board meeting earlier tonight when he got into a heated argument.  The patient states that he has not been able to calm down since then.  He started developing some palpitations.  Felt like his heart was racing.  He started having some chest tightness radiating across his chest.  States that since then it has improved slightly but still present.  He is not having any racing heart anymore.  Denies any shortness of breath.  No fevers or chills.  No headache or change in vision.  No focal weakness.  No abdominal pain or vomiting    History provided by:  Patient   used: No        Review of Systems   Constitutional:  Negative for chills and fever.   HENT:  Negative for congestion, ear pain and sore throat.    Eyes:  Negative for visual disturbance.   Respiratory:  Negative for shortness of breath.    Cardiovascular:  Positive for chest pain and palpitations.   Gastrointestinal:  Negative for abdominal pain.   Genitourinary:  Negative for difficulty urinating.   Musculoskeletal:  Negative for arthralgias.   Skin:  Negative for rash.   Neurological:  Negative for dizziness, weakness and numbness.   Psychiatric/Behavioral:  Negative for agitation.        Past Medical History:   Diagnosis Date    Acute bronchitis     Adult-onset obesity     Agitated     Allergic rhinitis     Anxiety and depression     Arthritis     Asthma     Brain concussion 2003    Cellulitis     Chronic GERD     Cluster headache     Diabetes mellitus     Diarrhea     Dyspnea     Erectile dysfunction 2022    Exposure to influenza     GERD (gastroesophageal reflux disease)     Gonadal dysgenesis     Headache     Hemorrhoid     HIV disease     HL (hearing loss)     Hyperlipidemia     Hypersomnia with sleep apnea     Hypertension,  benign     Kidney stone 2023    [facility]    Leg skin lesion, right     Low back pain 2015    Lumbosacral disc disease 2021    Memory loss     Migraine February 2022    Mixed dyslipidemia     Morbid obesity     MRSA (methicillin resistant Staphylococcus aureus) 2021    Neuropathy in diabetes     Pain in joint involving shoulder region     Pain in left lower leg     Pain of left shoulder region     Panniculitis     Peripheral neuropathy 2019    Pneumococcal vaccination declined     Pneumonia     Primary hypertension     Scabies     Sinusitis, acute     Sleep apnea 2011    Spindle cell type atypical fibroxanthoma     Sprain of ankle, sequela     Urinary tract infection 02-17-23       Allergies   Allergen Reactions    Amoxicillin Hives    Ceftazidime Hives    Cephalosporins Hives and Shortness Of Breath    Clarithromycin Hives and Anaphylaxis    Codeine Hives    Penicillins Hives and Seizure    Sulbactam Hives    Unasyn [Ampicillin-Sulbactam Sodium] Hives       Past Surgical History:   Procedure Laterality Date    EAR TUBES      GALLBLADDER SURGERY  05/17/2024    GASTRIC SLEEVE LAPAROSCOPIC      HIP SURGERY Right     HIP SLIPPED EPHISLEA    SINUS SURGERY      TONSILLECTOMY         Family History   Problem Relation Age of Onset    Hypertension Mother     Hyperlipidemia Mother     Diabetes Mother     Kidney disease Mother     Liver disease Mother         Cirrhosis of the liver    Neuropathy Mother     Hypertension Father     Arthritis Father     Cancer Father         Skin cancer    Hyperlipidemia Father     Bradycardia Sister     Breast cancer Other     Diabetes Other     Hypertension Other     Lung cancer Other     Heart attack Other     Skin cancer Other     Cancer Maternal Grandfather         Lung Cancer    Cancer Paternal Grandfather         Skin Cancer    Heart disease Paternal Grandfather         2 heart attacks 5 bypasses, congestive heart failure    Hyperlipidemia Paternal Grandfather     Cancer Maternal Aunt          Breast Cancer    Dementia Maternal Grandmother        Social History     Socioeconomic History    Marital status:    Tobacco Use    Smoking status: Former     Current packs/day: 0.00     Average packs/day: 0.5 packs/day for 0.7 years (0.4 ttl pk-yrs)     Types: Cigarettes     Start date: 2022     Quit date: 2023     Years since quittin.9     Passive exposure: Past    Smokeless tobacco: Never   Vaping Use    Vaping status: Never Used   Substance and Sexual Activity    Alcohol use: Not Currently    Drug use: Never    Sexual activity: Yes     Partners: Female     Birth control/protection: None           Objective   Physical Exam  Vitals and nursing note reviewed.   Constitutional:       General: He is not in acute distress.     Appearance: He is not ill-appearing or toxic-appearing.   Eyes:      Conjunctiva/sclera: Conjunctivae normal.   Cardiovascular:      Rate and Rhythm: Normal rate and regular rhythm.   Pulmonary:      Effort: Pulmonary effort is normal. No respiratory distress.   Abdominal:      General: Abdomen is flat. There is no distension.      Palpations: There is no mass.      Tenderness: There is no abdominal tenderness. There is no guarding or rebound.   Musculoskeletal:         General: No deformity. Normal range of motion.   Neurological:      General: No focal deficit present.      Mental Status: He is alert.      Sensory: No sensory deficit.      Motor: No weakness.         ECG 12 Lead Chest Pain      Date/Time: 2025 3:26 AM    Performed by: René Perez MD  Authorized by: René Perez MD  Interpreted by ED physician  Comparison: compared with previous ECG   Similar to previous ECG  Rhythm: sinus rhythm  Rate: normal  BPM: 77  QRS axis: normal  Conduction: conduction normal  ST Segments: ST segments normal  Other: no other findings  Clinical impression: normal ECG  Comments: Interpretation:  EKG was directly visualized by myself, interpretations as  documented in hospital course.               ED Course  ED Course as of 07/11/25 0329 Fri Jul 11, 2025 0200 Patient was initially seen in triage on presentation.  Due to overcrowding and severe volume surges, the patient was not immediately transferred to ER bed, as there were not any currently available.  Initial medical screening exam and workup was performed in triage in order to expedite patient care.   [JK]   0327 BP: 134/91 [JK]   0327 Temp: 97.8 °F (36.6 °C) [JK]   0327 Heart Rate: 79 [JK]   0327 Resp: 20 [JK]   0327 SpO2: 99 %  Interpretation:  Patient's vitals were directly viewed and interpreted by myself.   O2 sat 99% on room air, interpreted as normal.  Telemetry revealed a rate of 79 bpm, interpreted as normal sinus rhythm [JK]   0327 TSH Rfx On Abnormal To Free T4(!) [JK]   0327 Magnesium [JK]   0327 High Sensitivity Troponin T [JK]   0327 BNP [JK]   0327 CBC & Differential [JK]   0327 Comprehensive Metabolic Panel(!)  Interpretation:  Laboratory studies were reviewed and interpreted directly by myself.  Unremarkable [JK]   0327 XR Chest 1 View  Interpretation:  Imaging was directly visualized by myself, per my interpretations, chest x-ray unremarkable. [JK]   0327 On reevaluation, the patient is feeling much better.  Vital signs have improved.  Overall they are well-appearing.  There were no abnormal cardiopulmonary findings.  No respiratory distress.  Abdomen soft, nontender and no evidence of acute abdomen.  Story is minimally concerning for ACS, PE or dissection given the atypical nature, risk factors, history and physical examination.  Patient's heart score places the patient at low risk for acute coronary syndrome.  Patient feels comfortable following up with her primary care physician and/or primary cardiologist.  Patient was given strict return precautions.  Verbalized understanding. [JK]   0328 I had a discussion with the patient/family regarding diagnosis, diagnostic results, treatment  plan, and medications. The patient/family indicated understanding of these instructions. I spent adequate time at the bedside prior to discharge necessary to discuss the aftercare instructions, giving patient education, providing explanations of the results of our evaluations/findings, and my decision making to assure that the patient/family understand the plan of care. Time was allotted to answer questions at that time and throughout the ED course. Patient is required to maintain timely follow up, as discussed. I also discussed the potential for the development of an acute emergent condition requiring further evaluation, return to the ER, admission, or even surgical intervention.  I encouraged the patient to return to the emergency department immediately for any concerns, worsening symptoms, new complaints, or if symptoms persist and they are unable to seek follow-up in a timely fashion. The patient/family expressed understanding and agreement with this plan    Shared decision making:   After full review of the patient's clinical presentation, review of any work-up including but not limited to laboratory studies and radiology obtained, I had a discussion with the patient.  Treatment options were discussed as well as the risks, benefits and consequences.  I discussed all findings with the patient and family members if available.  During the discussion, treatment goals were understood by all as well as any misconceptions which were addressed with the patient.  Ample time was given for any questions they may have had.  They are in agreement with the treatment plan as well as final disposition. [JK]      ED Course User Index  [JK] René Perez MD                  HEART Score: 1                                      Medical Decision Making  This is a 44-year-old male presenting with some chest discomfort palpitations.  Patient had symptoms started after a argument earlier in the day.  Do believe this is likely  related to his anxiety.  EKG appears stable.  Atypical for ACS.  Overall, the patient is nontoxic.  Afebrile.  IV access was established in the patient.  Placed on continuous telemetry monitoring.  Given the patient's presentation, differential is broad and will require further evaluation.  Workup initiated.      Differential diagnosis: CAD, ACS, peptic ulcer disease, dyspepsia, pneumonia, bronchitis, atypical chest pain, angina, musculoskeletal pain, anxiety      Amount and/or Complexity of Data Reviewed  External Data Reviewed: labs, radiology, ECG and notes.     Details: External laboratories, imaging as well as notes were reviewed personally by myself.  All relevant studies were used to guide decision making.     Date of previous record: 10/30/2024    Source of note: PCP    Summary:  Patient was seen and evaluated for routine visit.  I did review basic laboratory studies on file as well as a previous chest x-ray and EKG.  Records reviewed    Labs: ordered. Decision-making details documented in ED Course.  Radiology: ordered and independent interpretation performed. Decision-making details documented in ED Course.  ECG/medicine tests: ordered and independent interpretation performed. Decision-making details documented in ED Course.    Risk  Prescription drug management.        Final diagnoses:   Nonspecific chest pain   Palpitations   Anxiety   Morbid obesity       ED Disposition  ED Disposition       ED Disposition   Discharge    Condition   Stable    Comment   --               [facility] CARDIOLOG[facility]  [address]  [address]  [facility] [address] [phone]  Call in 1 day           Medication List      No changes were made to your prescriptions during this visit.            René Perez MD  07/11/25 0329

## 2025-07-14 ENCOUNTER — OFFICE VISIT (OUTPATIENT)
Age: 45
End: 2025-07-14
Payer: COMMERCIAL

## 2025-07-14 VITALS
HEIGHT: 78 IN | WEIGHT: 315 LBS | SYSTOLIC BLOOD PRESSURE: 134 MMHG | BODY MASS INDEX: 36.45 KG/M2 | OXYGEN SATURATION: 97 % | DIASTOLIC BLOOD PRESSURE: 86 MMHG | HEART RATE: 71 BPM

## 2025-07-14 DIAGNOSIS — F41.8 SITUATIONAL ANXIETY: ICD-10-CM

## 2025-07-14 DIAGNOSIS — F43.9 TRAUMA AND STRESSOR-RELATED DISORDER: ICD-10-CM

## 2025-07-14 DIAGNOSIS — F33.0 MILD EPISODE OF RECURRENT MAJOR DEPRESSIVE DISORDER: Primary | ICD-10-CM

## 2025-07-14 RX ORDER — DEXTROMETHORPHAN HYDROBROMIDE, BUPROPION HYDROCHLORIDE 105; 45 MG/1; MG/1
1 TABLET, MULTILAYER, EXTENDED RELEASE ORAL 2 TIMES DAILY
Qty: 60 TABLET | Refills: 2 | Status: SHIPPED | OUTPATIENT
Start: 2025-07-14

## 2025-07-14 RX ORDER — VILAZODONE HYDROCHLORIDE 10 MG/1
10 TABLET ORAL DAILY
Qty: 90 TABLET | Refills: 1 | Status: SHIPPED | OUTPATIENT
Start: 2025-07-14

## 2025-07-14 RX ORDER — PROPRANOLOL HCL 20 MG
TABLET ORAL
Qty: 90 TABLET | Refills: 1 | Status: SHIPPED | OUTPATIENT
Start: 2025-07-14

## 2025-07-14 NOTE — PROGRESS NOTES
Follow Up Office Visit      Date: 2025   Patient Name: Dom Russell  : 1980   MRN: [id number]     Patient or patient representative verbalized consent for the use of Ambient Listening during the visit with  MELL Mckeon for chart documentation. 2025  15:58 EDT    Chief Complaint:  Dom Russell is a 44 y.o. male who is here today for follow up with medication management for anxiety and depression.    History of Present Illness  He has been experiencing significant stress due to issues at his workplace, which have led to a lack of patience with his family. He also reports memory issues, which he attributes to his PTSD diagnosis. He has difficulty sleeping at night due to racing thoughts. He has been under the care of a therapist, Jovan Cook, who attempted various treatments, including EMDR. However, when the therapist suggested involving his wife in the therapy, it did not work out as she was uncomfortable with the idea.     He has been taking propranolol daily, but only once instead of the prescribed four times. He finds it challenging to remember to take medications multiple times a day. He has never taken two tablets of propranolol at once. He was previously taken off all beta blockers, including metoprolol, and lost 30 pounds. However, he regained some weight and had to resume the medication. He underwent sleeve surgery and was doing well until the death of his child, which caused him to regain weight.    Interim History: He has been taking Auvelity once daily but ran out of his other medications and was waiting for this appointment to refill them. He reports that the current medication regimen is not significantly improving his condition.     Subjective     Review of Systems:   Review of Systems   Psychiatric/Behavioral:  Positive for depressed mood and stress. The patient is nervous/anxious.        Screening Scores:   PHQ-9: 13 (last visit, 12)  WON-7: 15  (last visit, 13)    Medications:     Current Outpatient Medications:     amLODIPine (NORVASC) 10 MG tablet, Take 1 tablet by mouth Daily., Disp: 90 tablet, Rfl: 1    aspirin 81 MG EC tablet, Take 1 tablet by mouth Daily., Disp: , Rfl:     benazepril (LOTENSIN) 20 MG tablet, Take 1 tablet by mouth daily, Disp: 90 tablet, Rfl: 3    cetirizine (ZyrTEC) 10 MG chewable tablet, 1 tablet., Disp: , Rfl:     Continuous Glucose Sensor (FreeStyle Wen 3 Sensor) misc, Use 1 each Every 14 (Fourteen) Days., Disp: 2 each, Rfl: 11    Dextromethorphan-buPROPion ER (Auvelity)  MG tablet controlled-release, Take 1 tablet by mouth 2 (Two) Times a Day., Disp: 60 tablet, Rfl: 2    fenofibrate micronized (LOFIBRA) 134 MG capsule, 1 capsule., Disp: , Rfl:     metFORMIN ER (GLUCOPHAGE-XR) 500 MG 24 hr tablet, Take 2 tablets by mouth 2 (Two) Times a Day., Disp: 360 tablet, Rfl: 3    metoprolol succinate XL (TOPROL-XL) 50 MG 24 hr tablet, Take 1 tablet by mouth Daily., Disp: 90 tablet, Rfl: 1    omeprazole (priLOSEC) 40 MG capsule, Take 1 capsule by mouth Daily., Disp: 90 capsule, Rfl: 1    propranolol (INDERAL) 20 MG tablet, Take 1/2 to 1 tablet (10 to 20 mg) up to three time daily as needed for anxiety., Disp: 90 tablet, Rfl: 1    rosuvastatin (CRESTOR) 20 MG tablet, Take 1 tablet by mouth Daily., Disp: 90 tablet, Rfl: 1    Tirzepatide 12.5 MG/0.5ML solution auto-injector, Inject 0.5 mL under the skin into the appropriate area as directed 1 (One) Time Per Week., Disp: 2 mL, Rfl: 3    vilazodone (VIIBRYD) 10 MG tablet tablet, Take 1 tablet by mouth Daily., Disp: 90 tablet, Rfl: 1    Allergies:   Allergies   Allergen Reactions    Amoxicillin Hives    Ceftazidime Hives    Cephalosporins Hives and Shortness Of Breath    Clarithromycin Hives and Anaphylaxis    Codeine Hives    Penicillins Hives and Seizure    Sulbactam Hives    Unasyn [Ampicillin-Sulbactam Sodium] Hives       Results Reviewed: n/a     The following portion of the  "patient's history were reviewed and updated appropriately: allergies, current and past medications, family history, medical history and social history.    Objective     Vital Signs:   Vitals:    07/14/25 1545   BP: 134/86   Pulse: 71   SpO2: 97%   Weight: (!) 154 kg (340 lb)   Height: 198.1 cm (78\")     Body mass index is 39.29 kg/m².     Mental Status Exam:   MENTAL STATUS EXAM   General Appearance:  Cleanly groomed and dressed  Eye Contact:  Good eye contact  Attitude:  Cooperative  Motor Activity:  Normal gait, posture and fidgety  Muscle Strength:  Normal  Speech:  Normal rate, tone, volume  Language:  Spontaneous  Mood and affect:  Normal, pleasant, anxious, depressed and irritable  Hopelessness:  1  Loneliness: 1  Thought Process:  Logical  Associations/ Thought Content:  No delusions  Hallucinations:  None  Suicidal Ideations:  Not present  Homicidal Ideation:  Not present  Sensorium:  Alert and clear  Orientation:  Person, place, situation and time  Immediate Recall, Recent, and Remote Memory:  Intact  Attention Span/ Concentration:  Good  Fund of Knowledge:  Appropriate for age and educational level  Intellectual Functioning:  Average range  Insight:  Good  Judgement:  Good  Reliability:  Good  Impulse Control:  Good        SUICIDE RISK ASSESSMENT/CSSRS:  1. Does patient have thoughts of suicide? no  2. Does patient have intent for suicide? no  3. Does patient have a current plan for suicide? no  4. History of suicide attempts: no  5. Family history of suicide or attempts: no  6. History of violent behaviors towards others or property or thoughts of committing suicide: no  7. History of sexual aggression toward others: no  8. Access to firearms or weapons: no    Labs Reviewed: n/a  UDS Reviewed: n/a  Chart since last visit reviewed: yes    Assessment / Plan    Quality Measures:  Tobacco cessation: Patient is a former tobacco user. No tobacco cessation education necessary.      Depression (PHQ >9): Patient " screened positive for depression with a PHQ score of 13. Follow up recommendations include medication management, suicide risk assessment, continued screening score monitoring and supportive care.     Medication Considerations:  Benzo: n/a  Stimulants: n/a   ROBSON reviewed and appropriate.     Risk Assessment: Risk of self-harm acutely is low. Risk of self-harm chronically is also low, but could be further elevated in the event of treatment noncompliance and/or AODA.    Impression/Formulation:  Patient appeared alert and oriented.  Patient is voluntarily seeking psychiatric care at [facility].  Patient is receptive to assistance with maintaining a stable lifestyle.  Conditions being treated include     ICD-10-CM ICD-9-CM   1. Mild episode of recurrent major depressive disorder  F33.0 296.31   2. Situational anxiety  F41.8 300.09   3. Trauma and stressor-related disorder  F43.9 309.81     308.9   .     Visit Diagnosis/Orders Placed This Visit:  Diagnoses and all orders for this visit:    1. Mild episode of recurrent major depressive disorder (Primary)  -     Dextromethorphan-buPROPion ER (Auvelity)  MG tablet controlled-release; Take 1 tablet by mouth 2 (Two) Times a Day.  Dispense: 60 tablet; Refill: 2  -     vilazodone (VIIBRYD) 10 MG tablet tablet; Take 1 tablet by mouth Daily.  Dispense: 90 tablet; Refill: 1    2. Situational anxiety  -     propranolol (INDERAL) 20 MG tablet; Take 1/2 to 1 tablet (10 to 20 mg) up to three time daily as needed for anxiety.  Dispense: 90 tablet; Refill: 1    3. Trauma and stressor-related disorder       Assessment & Plan  Content of Therapy:  During the session, the patient discussed his current struggles with depression and the ineffectiveness of his medication regimen. He reported experiencing significant stress related to his work and personal life, including conflicts at his workplace and the lingering emotional impact of his child's death on his  daily life and professional performance. The patient expressed difficulties in managing his emotions and maintaining patience, particularly with his children. The discussion included exploring the patient's feelings, reframing thoughts about his stressors, and addressing conflict resolution strategies.    Clinical Impression:  The patient appears to be experiencing significant depressive symptoms that are not adequately managed by his current medication regimen. He reports ongoing stress and emotional difficulties, including episodes of crying and feeling overwhelmed. Despite taking propranolol on an as needed basis, he has not found relief from his symptoms. The patient's mental health is impacted by external stressors, and he may benefit from adjustments to his medication and additional therapeutic support.    Therapeutic Intervention:  The patient was advised to take Auvelity twice daily for depression. A 90-day supply of Viibryd 10 mg will be provided, with instructions to take 10 mg for the first week and then increase to 20 mg daily. If he experiences any gastrointestinal side effects such as nausea, vomiting, constipation, or diarrhea, he can either maintain the 10 mg dose or discontinue the medication entirely. The patient was encouraged to engage in activities that promote stress relief and self-care. If he continues to feel unwell despite being on both Qelbree and Viibryd, a stimulant may be considered for his depression.    Plan:  - Take Auvelity twice daily.  - Start Viibryd 10 mg for the first week, then increase to 20 mg daily.  - Monitor for gastrointestinal side effects and adjust Viibryd dosage as needed.  - Engage in stress relief and self-care activities.  - Consider a stimulant for depression if symptoms persist.    Follow-up:  A follow-up visit is scheduled in 6 weeks to assess the effectiveness of the medication adjustments and overall mental health.    Notes & Risk Factors:  - The patient is  experiencing significant stress related to workplace conflicts and personal loss.  - Protective factors include his commitment to his community and family.    Any medications prescribed have been sent electronically to [facility] in [place].       Patient will continue supportive psychotherapy efforts and medications as indicated.  Discussed medication options and treatment plan of prescribed medication(s) as well as the risks, benefits, and potential side effects. Patient will contact this office, call 911 or present to the nearest emergency room should suicidal or homicidal ideations occur. Clinic will obtain release of information for current treatment team for continuity of care as needed. Patient ackowledged and verbally consented to continue with current treatment plan and was educated on the importance of compliance with treatment and follow-up appointments.           MELL Mascorro  [facility]    This is electronically signed by MELL Mascorro  07/14/2025 15:48 EDT

## 2025-07-17 ENCOUNTER — OFFICE VISIT (OUTPATIENT)
Dept: CARDIOLOGY | Facility: HOSPITAL | Age: 45
End: 2025-07-17
Payer: COMMERCIAL

## 2025-07-17 VITALS
DIASTOLIC BLOOD PRESSURE: 69 MMHG | OXYGEN SATURATION: 95 % | HEIGHT: 78 IN | HEART RATE: 98 BPM | WEIGHT: 315 LBS | BODY MASS INDEX: 36.45 KG/M2 | SYSTOLIC BLOOD PRESSURE: 126 MMHG

## 2025-07-17 DIAGNOSIS — R07.9 CHEST PAIN, UNSPECIFIED TYPE: Primary | ICD-10-CM

## 2025-07-17 DIAGNOSIS — R00.2 PALPITATIONS: ICD-10-CM

## 2025-07-17 DIAGNOSIS — I10 ESSENTIAL HYPERTENSION: ICD-10-CM

## 2025-07-17 NOTE — PROGRESS NOTES
"Chief Complaint  Chest Pain    Subjective      History of Present Illness {CC  Problem List  Visit  Diagnosis   Encounters  Notes  Medications  Labs  Result Review Imaging  Media :23}     Dom Russell, 44 y.o. male with HTN, HLD, BERNARD, DM presents to [facility] for Chest Pain.    Patient was recently evaluated at [facility] emergency department for complaints of chest discomfort and palpitations.  Emergency department work-up revealed normal troponin/BNP, CXR with no acute cardiopulmonary findings, and ECGs with no evidence of  arrhythmia/acute ischemia. Patient was instructed to follow-up at [facility] for further evaluation.    Patient presents today with no recurrence of chest pressure since emergency department evaluation. Notes that chest pain symptom onset was after a stressful event, and symptoms have been absent since that time. Noted heart rates above 100 that evening; noted irregular fluctuation in heart rates. 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       Objective     Vital Signs:   Vitals:    07/17/25 1509 07/17/25 1510   BP: 131/80 126/69   BP Location: Left arm Left arm   Patient Position: Sitting Standing   Cuff Size: Adult Large Adult   Pulse: 82 98   SpO2: 97% 95%   Weight: (!) 154 kg (340 lb 3.2 oz)    Height: 198.1 cm (78\")      Body mass index is 39.31 kg/m².  Physical Exam  Vitals and nursing note reviewed.   Constitutional:       Appearance: Normal appearance.   HENT:      Head: Normocephalic.   Eyes:      Extraocular Movements: Extraocular movements intact.   Neck:      Vascular: No carotid bruit.   Cardiovascular:      Rate and Rhythm: Normal rate and regular rhythm.      Pulses: Normal pulses.      Heart sounds: Normal heart sounds, S1 normal and S2 normal. No murmur heard.  Pulmonary:      Effort: Pulmonary effort is " normal. No respiratory distress.      Breath sounds: Normal breath sounds.   Musculoskeletal:      Cervical back: Neck supple.      Right lower leg: No edema.      Left lower leg: No edema.   Skin:     General: Skin is warm and dry.   Neurological:      General: No focal deficit present.      Mental Status: He is alert.   Psychiatric:         Mood and Affect: Mood normal.         Behavior: Behavior normal.         Thought Content: Thought content normal.      Data Reviewed:{ Labs  Result Review  Imaging  Med Tab  Media :23}     ECG 12 Lead Chest Pain (07/11/2025 01:45)   Holter Monitor - 72 Hour Up To 15 Days (03/11/2025 09:35)   High Sensitivity Troponin T (07/11/2025 01:51)  BNP (07/11/2025 01:51)  TSH Rfx On Abnormal To Free T4 (07/11/2025 01:51)  Magnesium (07/11/2025 01:51)  Lipid Panel (09/06/2024 08:39)  XR Chest 1 View (07/11/2025 02:09)     Lab Results   Component Value Date    GLUCOSE 184 (H) 07/11/2025    CALCIUM 8.8 07/11/2025     07/11/2025    K 3.9 07/11/2025    CO2 22.1 07/11/2025     07/11/2025    BUN 13.8 07/11/2025    CREATININE 0.71 (L) 07/11/2025    EGFR 117.5 07/11/2025    BCR 19.4 07/11/2025    ANIONGAP 11.9 07/11/2025     Lab Results   Component Value Date    TSH 8.180 (H) 07/11/2025     Lab Results   Component Value Date    WBC 8.14 07/11/2025    HGB 15.0 07/11/2025    HCT 43.1 07/11/2025    MCV 87.1 07/11/2025     07/11/2025       Assessment & Plan   Assessment and Plan {CC Problem List  Visit Diagnosis  ROS  Review (Popup)  Health Maintenance  Quality  BestPractice  Medications  SmartSets  SnapShot Encounters  Media :23}     1. Chest pain  -No recurrence of chest pain since ED evaluation. Atypical/non-exertional symptoms.   -ED workup with negative troponin/BNP, ECG without acute ischemic changes  -Quite active with no exertional/anginal symptoms  -Likely non-cardiac in setting of stressful event  -ED precautions for worsened or anginal symptoms      2.  Palpitations  -Recent episode of palpitation prior to ED evaluation during stress.  -Recent Holter monitor completed March 2025 with no significant arrhythmia reported.  - Recent ECG with normal sinus rhythm  -Discussed elevated TSH during ED evaluation could contribute to symptoms, he will discuss this with PCP  - Discussed option for repeat cardiac monitor, he would prefer to defer at this time given no significant palpitations and previous heart monitor with no significant arrhythmia  -Continue Toprol-XL 50 Mg daily as prescribed  - Discussed monitoring of palpitation symptoms with [facility] and [employer]  - We will plan to meet back in approximately 1 month to ensure palpitations have resolved.    3. Essential hypertension  -Reasonable control today  - Continue current medication regimen of amlodipine, benazepril as prescribed      Follow Up {Instructions Charge Capture  Follow-up Communications :23}     Return in about 1 month (around 8/17/2025) for Video visit, Recheck rhtyhm symptoms.      Patient was given instructions and counseling regarding his condition or for health maintenance advice. Please see specific information pulled into the AVS if appropriate. Patient was instructed to call the [facility] with any questions, concerns, or worsening symptoms.    Dictated Utilizing Dragon Dictation   Please note that portions of this note were completed with a voice recognition program. Part of this note may be an electronic transcription/translation of spoken language to printed text using the Dragon Dictation System.

## 2025-07-17 NOTE — PATIENT INSTRUCTIONS
- Oc mobile    - Message or Call PCP about elevated TSH  
I was physically present and participated in this delivery.

## 2025-07-18 ENCOUNTER — LAB (OUTPATIENT)
Dept: FAMILY MEDICINE CLINIC | Facility: CLINIC | Age: 45
End: 2025-07-18
Payer: COMMERCIAL

## 2025-07-18 DIAGNOSIS — R94.6 ABNORMAL THYROID FUNCTION TEST: ICD-10-CM

## 2025-07-18 PROCEDURE — 84439 ASSAY OF FREE THYROXINE: CPT | Performed by: FAMILY MEDICINE

## 2025-07-18 PROCEDURE — 84443 ASSAY THYROID STIM HORMONE: CPT | Performed by: FAMILY MEDICINE

## 2025-07-18 PROCEDURE — 84480 ASSAY TRIIODOTHYRONINE (T3): CPT | Performed by: FAMILY MEDICINE

## 2025-07-19 LAB
T3 SERPL-MCNC: 113 NG/DL (ref 80–200)
T4 FREE SERPL-MCNC: 1.1 NG/DL (ref 0.92–1.68)
TSH SERPL DL<=0.05 MIU/L-ACNC: 1.43 UIU/ML (ref 0.27–4.2)

## 2025-08-22 ENCOUNTER — TELEMEDICINE (OUTPATIENT)
Dept: CARDIOLOGY | Facility: HOSPITAL | Age: 45
End: 2025-08-22
Payer: COMMERCIAL

## 2025-08-22 DIAGNOSIS — R00.2 PALPITATIONS: ICD-10-CM

## 2025-08-22 DIAGNOSIS — R07.9 CHEST PAIN, UNSPECIFIED TYPE: Primary | ICD-10-CM

## 2025-08-22 DIAGNOSIS — I10 ESSENTIAL HYPERTENSION: ICD-10-CM

## 2025-08-25 ENCOUNTER — OFFICE VISIT (OUTPATIENT)
Age: 45
End: 2025-08-25
Payer: COMMERCIAL

## 2025-08-25 VITALS
OXYGEN SATURATION: 99 % | SYSTOLIC BLOOD PRESSURE: 134 MMHG | WEIGHT: 315 LBS | BODY MASS INDEX: 36.45 KG/M2 | DIASTOLIC BLOOD PRESSURE: 88 MMHG | HEIGHT: 78 IN | HEART RATE: 66 BPM

## 2025-08-25 DIAGNOSIS — F41.8 SITUATIONAL ANXIETY: ICD-10-CM

## 2025-08-25 DIAGNOSIS — F33.0 MILD EPISODE OF RECURRENT MAJOR DEPRESSIVE DISORDER: Primary | ICD-10-CM

## 2025-08-25 DIAGNOSIS — F90.0 ADHD (ATTENTION DEFICIT HYPERACTIVITY DISORDER), INATTENTIVE TYPE: ICD-10-CM

## 2025-08-25 DIAGNOSIS — F43.9 TRAUMA AND STRESSOR-RELATED DISORDER: ICD-10-CM

## 2025-08-25 RX ORDER — DEXTROAMPHETAMINE SACCHARATE, AMPHETAMINE ASPARTATE, DEXTROAMPHETAMINE SULFATE AND AMPHETAMINE SULFATE 2.5; 2.5; 2.5; 2.5 MG/1; MG/1; MG/1; MG/1
10 TABLET ORAL 2 TIMES DAILY
Qty: 60 TABLET | Refills: 0 | Status: SHIPPED | OUTPATIENT
Start: 2025-08-25

## 2025-08-25 RX ORDER — VILAZODONE HYDROCHLORIDE 20 MG/1
20 TABLET ORAL DAILY
Qty: 90 TABLET | Refills: 1 | Status: SHIPPED | OUTPATIENT
Start: 2025-08-25

## 2025-08-26 PROBLEM — F90.0 ADHD (ATTENTION DEFICIT HYPERACTIVITY DISORDER), INATTENTIVE TYPE: Status: ACTIVE | Noted: 2025-08-26
